# Patient Record
Sex: FEMALE | Race: BLACK OR AFRICAN AMERICAN | NOT HISPANIC OR LATINO | Employment: OTHER | URBAN - METROPOLITAN AREA
[De-identification: names, ages, dates, MRNs, and addresses within clinical notes are randomized per-mention and may not be internally consistent; named-entity substitution may affect disease eponyms.]

---

## 2018-01-25 ENCOUNTER — TRANSCRIBE ORDERS (OUTPATIENT)
Dept: ADMINISTRATIVE | Facility: HOSPITAL | Age: 83
End: 2018-01-25

## 2018-01-25 DIAGNOSIS — E78.5 HYPERLIPIDEMIA, UNSPECIFIED HYPERLIPIDEMIA TYPE: ICD-10-CM

## 2018-01-25 DIAGNOSIS — I15.8 SECONDARY DIASTOLIC HYPERTENSION: ICD-10-CM

## 2018-01-25 DIAGNOSIS — R73.01 IMPAIRED FASTING GLUCOSE: Primary | ICD-10-CM

## 2018-01-25 DIAGNOSIS — Z12.39 BREAST SCREENING: ICD-10-CM

## 2018-01-26 ENCOUNTER — APPOINTMENT (OUTPATIENT)
Dept: LAB | Facility: CLINIC | Age: 83
End: 2018-01-26
Payer: MEDICARE

## 2018-01-26 DIAGNOSIS — E78.5 HYPERLIPIDEMIA, UNSPECIFIED HYPERLIPIDEMIA TYPE: ICD-10-CM

## 2018-01-26 DIAGNOSIS — R73.01 IMPAIRED FASTING GLUCOSE: ICD-10-CM

## 2018-01-26 DIAGNOSIS — Z12.39 BREAST SCREENING: ICD-10-CM

## 2018-01-26 DIAGNOSIS — I15.8 SECONDARY DIASTOLIC HYPERTENSION: ICD-10-CM

## 2018-01-26 LAB
ALBUMIN SERPL BCP-MCNC: 3.3 G/DL (ref 3.5–5)
ALP SERPL-CCNC: 81 U/L (ref 46–116)
ALT SERPL W P-5'-P-CCNC: 23 U/L (ref 12–78)
ANION GAP SERPL CALCULATED.3IONS-SCNC: 7 MMOL/L (ref 4–13)
AST SERPL W P-5'-P-CCNC: 25 U/L (ref 5–45)
BASOPHILS # BLD AUTO: 0.05 THOUSANDS/ΜL (ref 0–0.1)
BASOPHILS NFR BLD AUTO: 1 % (ref 0–1)
BILIRUB SERPL-MCNC: 0.4 MG/DL (ref 0.2–1)
BUN SERPL-MCNC: 32 MG/DL (ref 5–25)
CALCIUM SERPL-MCNC: 9.6 MG/DL (ref 8.3–10.1)
CHLORIDE SERPL-SCNC: 104 MMOL/L (ref 100–108)
CHOLEST SERPL-MCNC: 143 MG/DL (ref 50–200)
CO2 SERPL-SCNC: 30 MMOL/L (ref 21–32)
CREAT SERPL-MCNC: 1.58 MG/DL (ref 0.6–1.3)
CREAT UR-MCNC: 46 MG/DL
EOSINOPHIL # BLD AUTO: 0.44 THOUSAND/ΜL (ref 0–0.61)
EOSINOPHIL NFR BLD AUTO: 6 % (ref 0–6)
ERYTHROCYTE [DISTWIDTH] IN BLOOD BY AUTOMATED COUNT: 13.5 % (ref 11.6–15.1)
EST. AVERAGE GLUCOSE BLD GHB EST-MCNC: 151 MG/DL
GFR SERPL CREATININE-BSD FRML MDRD: 29 ML/MIN/1.73SQ M
GLUCOSE P FAST SERPL-MCNC: 146 MG/DL (ref 65–99)
HBA1C MFR BLD: 6.9 % (ref 4.2–6.3)
HCT VFR BLD AUTO: 38.4 % (ref 34.8–46.1)
HDLC SERPL-MCNC: 82 MG/DL (ref 40–60)
HGB BLD-MCNC: 12.6 G/DL (ref 11.5–15.4)
LDLC SERPL CALC-MCNC: 50 MG/DL (ref 0–100)
LYMPHOCYTES # BLD AUTO: 1.69 THOUSANDS/ΜL (ref 0.6–4.47)
LYMPHOCYTES NFR BLD AUTO: 23 % (ref 14–44)
MCH RBC QN AUTO: 27.6 PG (ref 26.8–34.3)
MCHC RBC AUTO-ENTMCNC: 32.8 G/DL (ref 31.4–37.4)
MCV RBC AUTO: 84 FL (ref 82–98)
MICROALBUMIN UR-MCNC: 74.2 MG/L (ref 0–20)
MICROALBUMIN/CREAT 24H UR: 161 MG/G CREATININE (ref 0–30)
MONOCYTES # BLD AUTO: 0.5 THOUSAND/ΜL (ref 0.17–1.22)
MONOCYTES NFR BLD AUTO: 7 % (ref 4–12)
NEUTROPHILS # BLD AUTO: 4.83 THOUSANDS/ΜL (ref 1.85–7.62)
NEUTS SEG NFR BLD AUTO: 63 % (ref 43–75)
PLATELET # BLD AUTO: 243 THOUSANDS/UL (ref 149–390)
PMV BLD AUTO: 9.7 FL (ref 8.9–12.7)
POTASSIUM SERPL-SCNC: 3.9 MMOL/L (ref 3.5–5.3)
PROT SERPL-MCNC: 8.2 G/DL (ref 6.4–8.2)
RBC # BLD AUTO: 4.56 MILLION/UL (ref 3.81–5.12)
SODIUM SERPL-SCNC: 141 MMOL/L (ref 136–145)
TRIGL SERPL-MCNC: 53 MG/DL
TSH SERPL DL<=0.05 MIU/L-ACNC: 3.59 UIU/ML (ref 0.36–3.74)
URATE SERPL-MCNC: 5.8 MG/DL (ref 2–6.8)
WBC # BLD AUTO: 7.51 THOUSAND/UL (ref 4.31–10.16)

## 2018-01-26 PROCEDURE — 84443 ASSAY THYROID STIM HORMONE: CPT

## 2018-01-26 PROCEDURE — 36415 COLL VENOUS BLD VENIPUNCTURE: CPT

## 2018-01-26 PROCEDURE — 83036 HEMOGLOBIN GLYCOSYLATED A1C: CPT

## 2018-01-26 PROCEDURE — 82570 ASSAY OF URINE CREATININE: CPT | Performed by: FAMILY MEDICINE

## 2018-01-26 PROCEDURE — 80061 LIPID PANEL: CPT

## 2018-01-26 PROCEDURE — 80053 COMPREHEN METABOLIC PANEL: CPT

## 2018-01-26 PROCEDURE — 84550 ASSAY OF BLOOD/URIC ACID: CPT

## 2018-01-26 PROCEDURE — 85025 COMPLETE CBC W/AUTO DIFF WBC: CPT

## 2018-01-26 PROCEDURE — 82043 UR ALBUMIN QUANTITATIVE: CPT | Performed by: FAMILY MEDICINE

## 2018-02-08 ENCOUNTER — HOSPITAL ENCOUNTER (OUTPATIENT)
Dept: RADIOLOGY | Age: 83
Discharge: HOME | End: 2018-02-08
Payer: MEDICARE

## 2018-02-08 DIAGNOSIS — Z12.39 BREAST SCREENING: ICD-10-CM

## 2018-02-08 PROCEDURE — 77080 DXA BONE DENSITY AXIAL: CPT

## 2018-02-28 ENCOUNTER — APPOINTMENT (EMERGENCY)
Dept: CT IMAGING | Facility: HOSPITAL | Age: 83
End: 2018-02-28
Payer: MEDICARE

## 2018-02-28 ENCOUNTER — HOSPITAL ENCOUNTER (EMERGENCY)
Facility: HOSPITAL | Age: 83
End: 2018-03-01
Attending: EMERGENCY MEDICINE | Admitting: EMERGENCY MEDICINE
Payer: MEDICARE

## 2018-02-28 ENCOUNTER — APPOINTMENT (EMERGENCY)
Dept: RADIOLOGY | Facility: HOSPITAL | Age: 83
End: 2018-02-28
Payer: MEDICARE

## 2018-02-28 DIAGNOSIS — R91.1 PULMONARY NODULE: ICD-10-CM

## 2018-02-28 DIAGNOSIS — S00.03XA HEMATOMA OF FRONTAL SCALP, INITIAL ENCOUNTER: ICD-10-CM

## 2018-02-28 DIAGNOSIS — S32.040A: Primary | ICD-10-CM

## 2018-02-28 LAB
ALBUMIN SERPL BCP-MCNC: 3.2 G/DL (ref 3.5–5)
ALP SERPL-CCNC: 71 U/L (ref 46–116)
ALT SERPL W P-5'-P-CCNC: 26 U/L (ref 12–78)
ANION GAP SERPL CALCULATED.3IONS-SCNC: 6 MMOL/L (ref 4–13)
AST SERPL W P-5'-P-CCNC: 23 U/L (ref 5–45)
BASOPHILS # BLD AUTO: 0.02 THOUSANDS/ΜL (ref 0–0.1)
BASOPHILS NFR BLD AUTO: 0 % (ref 0–1)
BILIRUB SERPL-MCNC: 0.3 MG/DL (ref 0.2–1)
BUN SERPL-MCNC: 24 MG/DL (ref 5–25)
CALCIUM SERPL-MCNC: 9.6 MG/DL (ref 8.3–10.1)
CHLORIDE SERPL-SCNC: 103 MMOL/L (ref 100–108)
CO2 SERPL-SCNC: 30 MMOL/L (ref 21–32)
CREAT SERPL-MCNC: 1.44 MG/DL (ref 0.6–1.3)
EOSINOPHIL # BLD AUTO: 0.33 THOUSAND/ΜL (ref 0–0.61)
EOSINOPHIL NFR BLD AUTO: 3 % (ref 0–6)
ERYTHROCYTE [DISTWIDTH] IN BLOOD BY AUTOMATED COUNT: 13.8 % (ref 11.6–15.1)
GFR SERPL CREATININE-BSD FRML MDRD: 37 ML/MIN/1.73SQ M
GLUCOSE SERPL-MCNC: 160 MG/DL (ref 65–140)
HCT VFR BLD AUTO: 35.2 % (ref 34.8–46.1)
HGB BLD-MCNC: 11.7 G/DL (ref 11.5–15.4)
LYMPHOCYTES # BLD AUTO: 2.05 THOUSANDS/ΜL (ref 0.6–4.47)
LYMPHOCYTES NFR BLD AUTO: 21 % (ref 14–44)
MCH RBC QN AUTO: 28.1 PG (ref 26.8–34.3)
MCHC RBC AUTO-ENTMCNC: 33.2 G/DL (ref 31.4–37.4)
MCV RBC AUTO: 85 FL (ref 82–98)
MONOCYTES # BLD AUTO: 0.56 THOUSAND/ΜL (ref 0.17–1.22)
MONOCYTES NFR BLD AUTO: 6 % (ref 4–12)
NEUTROPHILS # BLD AUTO: 6.94 THOUSANDS/ΜL (ref 1.85–7.62)
NEUTS SEG NFR BLD AUTO: 70 % (ref 43–75)
PLATELET # BLD AUTO: 200 THOUSANDS/UL (ref 149–390)
PMV BLD AUTO: 9.6 FL (ref 8.9–12.7)
POTASSIUM SERPL-SCNC: 4 MMOL/L (ref 3.5–5.3)
PROT SERPL-MCNC: 7.8 G/DL (ref 6.4–8.2)
RBC # BLD AUTO: 4.16 MILLION/UL (ref 3.81–5.12)
SODIUM SERPL-SCNC: 139 MMOL/L (ref 136–145)
WBC # BLD AUTO: 9.9 THOUSAND/UL (ref 4.31–10.16)

## 2018-02-28 PROCEDURE — 70486 CT MAXILLOFACIAL W/O DYE: CPT

## 2018-02-28 PROCEDURE — 70450 CT HEAD/BRAIN W/O DYE: CPT

## 2018-02-28 PROCEDURE — 71250 CT THORAX DX C-: CPT

## 2018-02-28 PROCEDURE — 74176 CT ABD & PELVIS W/O CONTRAST: CPT

## 2018-02-28 PROCEDURE — 72125 CT NECK SPINE W/O DYE: CPT

## 2018-02-28 PROCEDURE — 73564 X-RAY EXAM KNEE 4 OR MORE: CPT

## 2018-02-28 PROCEDURE — 36415 COLL VENOUS BLD VENIPUNCTURE: CPT | Performed by: EMERGENCY MEDICINE

## 2018-02-28 PROCEDURE — 80053 COMPREHEN METABOLIC PANEL: CPT | Performed by: EMERGENCY MEDICINE

## 2018-02-28 PROCEDURE — 85025 COMPLETE CBC W/AUTO DIFF WBC: CPT | Performed by: EMERGENCY MEDICINE

## 2018-03-01 ENCOUNTER — HOSPITAL ENCOUNTER (INPATIENT)
Facility: HOSPITAL | Age: 83
LOS: 1 days | Discharge: HOME | DRG: 552 | End: 2018-03-02
Attending: SURGERY | Admitting: SURGERY
Payer: MEDICARE

## 2018-03-01 ENCOUNTER — APPOINTMENT (INPATIENT)
Dept: RADIOLOGY | Facility: HOSPITAL | Age: 83
DRG: 552 | End: 2018-03-01
Payer: MEDICARE

## 2018-03-01 VITALS
HEIGHT: 61 IN | SYSTOLIC BLOOD PRESSURE: 130 MMHG | OXYGEN SATURATION: 98 % | DIASTOLIC BLOOD PRESSURE: 63 MMHG | BODY MASS INDEX: 28.13 KG/M2 | RESPIRATION RATE: 16 BRPM | HEART RATE: 96 BPM | TEMPERATURE: 98.2 F | WEIGHT: 149 LBS

## 2018-03-01 DIAGNOSIS — S32.049A CLOSED FRACTURE OF FOURTH LUMBAR VERTEBRA, UNSPECIFIED FRACTURE MORPHOLOGY, INITIAL ENCOUNTER (HCC): Primary | ICD-10-CM

## 2018-03-01 PROBLEM — E11.9 DM2 (DIABETES MELLITUS, TYPE 2) (HCC): Chronic | Status: ACTIVE | Noted: 2018-03-01

## 2018-03-01 PROBLEM — E78.5 HYPERLIPIDEMIA: Chronic | Status: ACTIVE | Noted: 2018-03-01

## 2018-03-01 PROBLEM — I10 HTN (HYPERTENSION): Chronic | Status: ACTIVE | Noted: 2018-03-01
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
## 2018-03-01 PROBLEM — J44.9 COPD (CHRONIC OBSTRUCTIVE PULMONARY DISEASE) (HCC): Chronic | Status: ACTIVE | Noted: 2018-03-01

## 2018-03-01 PROBLEM — N18.9 CKD (CHRONIC KIDNEY DISEASE): Chronic | Status: RESOLVED | Noted: 2018-03-01 | Resolved: 2018-03-01

## 2018-03-01 PROBLEM — N17.9 ACUTE KIDNEY INJURY SUPERIMPOSED ON CHRONIC KIDNEY DISEASE (HCC): Status: ACTIVE | Noted: 2018-03-01

## 2018-03-01 PROBLEM — K59.00 CONSTIPATION: Status: ACTIVE | Noted: 2018-03-01

## 2018-03-01 PROBLEM — N18.9 ACUTE KIDNEY INJURY SUPERIMPOSED ON CHRONIC KIDNEY DISEASE (HCC): Status: ACTIVE | Noted: 2018-03-01

## 2018-03-01 PROBLEM — S32.040A CLOSED COMPRESSION FRACTURE OF L4 LUMBAR VERTEBRA: Status: ACTIVE | Noted: 2018-03-01

## 2018-03-01 PROBLEM — S00.03XA HEMATOMA OF FRONTAL SCALP: Status: ACTIVE | Noted: 2018-03-01

## 2018-03-01 PROBLEM — N32.81 OVERACTIVE BLADDER: Chronic | Status: ACTIVE | Noted: 2018-03-01

## 2018-03-01 PROBLEM — R26.2 AMBULATORY DYSFUNCTION: Status: ACTIVE | Noted: 2018-03-01

## 2018-03-01 PROBLEM — R53.81 PHYSICAL DECONDITIONING: Status: ACTIVE | Noted: 2018-03-01

## 2018-03-01 PROBLEM — E03.9 HYPOTHYROIDISM: Chronic | Status: ACTIVE | Noted: 2018-03-01

## 2018-03-01 PROBLEM — R41.89 COGNITIVE IMPAIRMENT: Status: ACTIVE | Noted: 2018-03-01

## 2018-03-01 PROBLEM — N18.9 CKD (CHRONIC KIDNEY DISEASE): Chronic | Status: ACTIVE | Noted: 2018-03-01

## 2018-03-01 LAB
ANION GAP SERPL CALCULATED.3IONS-SCNC: 8 MMOL/L (ref 4–13)
BUN SERPL-MCNC: 24 MG/DL (ref 5–25)
CALCIUM SERPL-MCNC: 9.3 MG/DL (ref 8.3–10.1)
CHLORIDE SERPL-SCNC: 105 MMOL/L (ref 100–108)
CO2 SERPL-SCNC: 29 MMOL/L (ref 21–32)
CREAT SERPL-MCNC: 1.33 MG/DL (ref 0.6–1.3)
GFR SERPL CREATININE-BSD FRML MDRD: 41 ML/MIN/1.73SQ M
GLUCOSE SERPL-MCNC: 130 MG/DL (ref 65–140)
GLUCOSE SERPL-MCNC: 187 MG/DL (ref 65–140)
GLUCOSE SERPL-MCNC: 231 MG/DL (ref 65–140)
GLUCOSE SERPL-MCNC: 262 MG/DL (ref 65–140)
GLUCOSE SERPL-MCNC: 265 MG/DL (ref 65–140)
PLATELET # BLD AUTO: 200 THOUSANDS/UL (ref 149–390)
PMV BLD AUTO: 9.8 FL (ref 8.9–12.7)
POTASSIUM SERPL-SCNC: 4 MMOL/L (ref 3.5–5.3)
SODIUM SERPL-SCNC: 142 MMOL/L (ref 136–145)

## 2018-03-01 PROCEDURE — 99284 EMERGENCY DEPT VISIT MOD MDM: CPT

## 2018-03-01 PROCEDURE — 99285 EMERGENCY DEPT VISIT HI MDM: CPT

## 2018-03-01 PROCEDURE — G8979 MOBILITY GOAL STATUS: HCPCS

## 2018-03-01 PROCEDURE — 72100 X-RAY EXAM L-S SPINE 2/3 VWS: CPT

## 2018-03-01 PROCEDURE — G8987 SELF CARE CURRENT STATUS: HCPCS

## 2018-03-01 PROCEDURE — 82948 REAGENT STRIP/BLOOD GLUCOSE: CPT

## 2018-03-01 PROCEDURE — 99232 SBSQ HOSP IP/OBS MODERATE 35: CPT | Performed by: FAMILY MEDICINE

## 2018-03-01 PROCEDURE — 97163 PT EVAL HIGH COMPLEX 45 MIN: CPT

## 2018-03-01 PROCEDURE — G8978 MOBILITY CURRENT STATUS: HCPCS

## 2018-03-01 PROCEDURE — 97166 OT EVAL MOD COMPLEX 45 MIN: CPT

## 2018-03-01 PROCEDURE — 85049 AUTOMATED PLATELET COUNT: CPT | Performed by: SURGERY

## 2018-03-01 PROCEDURE — 80048 BASIC METABOLIC PNL TOTAL CA: CPT | Performed by: SURGERY

## 2018-03-01 PROCEDURE — G8989 SELF CARE D/C STATUS: HCPCS

## 2018-03-01 PROCEDURE — 96374 THER/PROPH/DIAG INJ IV PUSH: CPT

## 2018-03-01 PROCEDURE — G8988 SELF CARE GOAL STATUS: HCPCS

## 2018-03-01 PROCEDURE — 99222 1ST HOSP IP/OBS MODERATE 55: CPT | Performed by: SURGERY

## 2018-03-01 PROCEDURE — 99223 1ST HOSP IP/OBS HIGH 75: CPT | Performed by: PHYSICIAN ASSISTANT

## 2018-03-01 RX ORDER — HEPARIN SODIUM 5000 [USP'U]/ML
5000 INJECTION, SOLUTION INTRAVENOUS; SUBCUTANEOUS EVERY 8 HOURS SCHEDULED
Status: DISCONTINUED | OUTPATIENT
Start: 2018-03-01 | End: 2018-03-02 | Stop reason: HOSPADM

## 2018-03-01 RX ORDER — OXYCODONE HYDROCHLORIDE 5 MG/1
5 TABLET ORAL EVERY 4 HOURS PRN
Status: DISCONTINUED | OUTPATIENT
Start: 2018-03-01 | End: 2018-03-02 | Stop reason: HOSPADM

## 2018-03-01 RX ORDER — OXYCODONE HYDROCHLORIDE 5 MG/1
2.5 TABLET ORAL EVERY 4 HOURS PRN
Status: DISCONTINUED | OUTPATIENT
Start: 2018-03-01 | End: 2018-03-02 | Stop reason: HOSPADM

## 2018-03-01 RX ORDER — INSULIN GLARGINE 100 [IU]/ML
10 INJECTION, SOLUTION SUBCUTANEOUS
Status: DISCONTINUED | OUTPATIENT
Start: 2018-03-01 | End: 2018-03-02 | Stop reason: HOSPADM

## 2018-03-01 RX ORDER — VALSARTAN 160 MG/1
160 TABLET ORAL DAILY
COMMUNITY
End: 2022-01-10

## 2018-03-01 RX ORDER — MORPHINE SULFATE 2 MG/ML
2 INJECTION, SOLUTION INTRAMUSCULAR; INTRAVENOUS ONCE
Status: COMPLETED | OUTPATIENT
Start: 2018-03-01 | End: 2018-03-01

## 2018-03-01 RX ORDER — OXYBUTYNIN CHLORIDE 5 MG/1
5 TABLET, EXTENDED RELEASE ORAL DAILY
Status: DISCONTINUED | OUTPATIENT
Start: 2018-03-01 | End: 2018-03-01

## 2018-03-01 RX ORDER — ASPIRIN 81 MG/1
81 TABLET ORAL DAILY
COMMUNITY

## 2018-03-01 RX ORDER — ACETAMINOPHEN 325 MG/1
650 TABLET ORAL EVERY 6 HOURS PRN
Status: DISCONTINUED | OUTPATIENT
Start: 2018-03-01 | End: 2018-03-01

## 2018-03-01 RX ORDER — B-COMPLEX WITH VITAMIN C
2 TABLET ORAL
Status: DISCONTINUED | OUTPATIENT
Start: 2018-03-01 | End: 2018-03-02 | Stop reason: HOSPADM

## 2018-03-01 RX ORDER — OXYBUTYNIN CHLORIDE 5 MG/1
5 TABLET, EXTENDED RELEASE ORAL DAILY
COMMUNITY
End: 2018-03-14

## 2018-03-01 RX ORDER — GLIMEPIRIDE 2 MG/1
2 TABLET ORAL
COMMUNITY
End: 2019-08-16 | Stop reason: ALTCHOICE

## 2018-03-01 RX ORDER — ACETAMINOPHEN 325 MG/1
650 TABLET ORAL EVERY 8 HOURS SCHEDULED
Status: DISCONTINUED | OUTPATIENT
Start: 2018-03-01 | End: 2018-03-02 | Stop reason: HOSPADM

## 2018-03-01 RX ORDER — ATORVASTATIN CALCIUM 40 MG/1
40 TABLET, FILM COATED ORAL DAILY
Status: DISCONTINUED | OUTPATIENT
Start: 2018-03-01 | End: 2018-03-02 | Stop reason: HOSPADM

## 2018-03-01 RX ORDER — BISACODYL 10 MG
10 SUPPOSITORY, RECTAL RECTAL DAILY PRN
Status: DISCONTINUED | OUTPATIENT
Start: 2018-03-01 | End: 2018-03-02

## 2018-03-01 RX ORDER — LIDOCAINE 50 MG/G
1 PATCH TOPICAL DAILY
Status: DISCONTINUED | OUTPATIENT
Start: 2018-03-01 | End: 2018-03-02 | Stop reason: HOSPADM

## 2018-03-01 RX ORDER — FLUTICASONE PROPIONATE 110 UG/1
1 AEROSOL, METERED RESPIRATORY (INHALATION) 2 TIMES DAILY
COMMUNITY
End: 2019-10-17

## 2018-03-01 RX ORDER — DOCUSATE SODIUM 100 MG/1
100 CAPSULE, LIQUID FILLED ORAL 2 TIMES DAILY
Status: DISCONTINUED | OUTPATIENT
Start: 2018-03-01 | End: 2018-03-02 | Stop reason: HOSPADM

## 2018-03-01 RX ORDER — LEVOTHYROXINE SODIUM 0.1 MG/1
100 TABLET ORAL
Status: DISCONTINUED | OUTPATIENT
Start: 2018-03-01 | End: 2018-03-02 | Stop reason: HOSPADM

## 2018-03-01 RX ORDER — LEVOTHYROXINE SODIUM 0.1 MG/1
100 TABLET ORAL DAILY
COMMUNITY

## 2018-03-01 RX ORDER — SENNOSIDES 8.6 MG
2 TABLET ORAL
Status: DISCONTINUED | OUTPATIENT
Start: 2018-03-01 | End: 2018-03-02 | Stop reason: HOSPADM

## 2018-03-01 RX ORDER — FLUTICASONE PROPIONATE 110 UG/1
1 AEROSOL, METERED RESPIRATORY (INHALATION) 2 TIMES DAILY
Status: DISCONTINUED | OUTPATIENT
Start: 2018-03-01 | End: 2018-03-02 | Stop reason: HOSPADM

## 2018-03-01 RX ORDER — VALSARTAN 160 MG/1
160 TABLET ORAL DAILY
Status: DISCONTINUED | OUTPATIENT
Start: 2018-03-01 | End: 2018-03-02 | Stop reason: HOSPADM

## 2018-03-01 RX ORDER — SODIUM CHLORIDE 9 MG/ML
75 INJECTION, SOLUTION INTRAVENOUS CONTINUOUS
Status: DISCONTINUED | OUTPATIENT
Start: 2018-03-01 | End: 2018-03-02 | Stop reason: HOSPADM

## 2018-03-01 RX ORDER — DOCUSATE SODIUM 100 MG/1
100 CAPSULE, LIQUID FILLED ORAL 2 TIMES DAILY
COMMUNITY

## 2018-03-01 RX ORDER — ONDANSETRON 2 MG/ML
4 INJECTION INTRAMUSCULAR; INTRAVENOUS EVERY 6 HOURS PRN
Status: DISCONTINUED | OUTPATIENT
Start: 2018-03-01 | End: 2018-03-02 | Stop reason: HOSPADM

## 2018-03-01 RX ORDER — PROCHLORPERAZINE 25 MG
25 SUPPOSITORY, RECTAL RECTAL EVERY 12 HOURS PRN
Status: DISCONTINUED | OUTPATIENT
Start: 2018-03-01 | End: 2018-03-02

## 2018-03-01 RX ORDER — ATORVASTATIN CALCIUM 40 MG/1
40 TABLET, FILM COATED ORAL DAILY
COMMUNITY

## 2018-03-01 RX ADMIN — MORPHINE SULFATE 2 MG: 2 INJECTION, SOLUTION INTRAMUSCULAR; INTRAVENOUS at 02:19

## 2018-03-01 RX ADMIN — SENNOSIDES 17.2 MG: 8.6 TABLET, FILM COATED ORAL at 21:59

## 2018-03-01 RX ADMIN — FLUTICASONE PROPIONATE AND SALMETEROL 1 PUFF: 50; 100 POWDER RESPIRATORY (INHALATION) at 09:37

## 2018-03-01 RX ADMIN — HEPARIN SODIUM 5000 UNITS: 5000 INJECTION, SOLUTION INTRAVENOUS; SUBCUTANEOUS at 14:11

## 2018-03-01 RX ADMIN — INSULIN LISPRO 2 UNITS: 100 INJECTION, SOLUTION INTRAVENOUS; SUBCUTANEOUS at 12:43

## 2018-03-01 RX ADMIN — INSULIN LISPRO 1 UNITS: 100 INJECTION, SOLUTION INTRAVENOUS; SUBCUTANEOUS at 08:57

## 2018-03-01 RX ADMIN — OXYBUTYNIN CHLORIDE 5 MG: 5 TABLET, EXTENDED RELEASE ORAL at 09:36

## 2018-03-01 RX ADMIN — FLUTICASONE PROPIONATE 1 PUFF: 110 AEROSOL, METERED RESPIRATORY (INHALATION) at 17:40

## 2018-03-01 RX ADMIN — FLUTICASONE PROPIONATE AND SALMETEROL 1 PUFF: 50; 100 POWDER RESPIRATORY (INHALATION) at 21:58

## 2018-03-01 RX ADMIN — ATORVASTATIN CALCIUM 40 MG: 40 TABLET, FILM COATED ORAL at 09:35

## 2018-03-01 RX ADMIN — INSULIN LISPRO 3 UNITS: 100 INJECTION, SOLUTION INTRAVENOUS; SUBCUTANEOUS at 12:43

## 2018-03-01 RX ADMIN — HEPARIN SODIUM 5000 UNITS: 5000 INJECTION, SOLUTION INTRAVENOUS; SUBCUTANEOUS at 21:58

## 2018-03-01 RX ADMIN — FLUTICASONE PROPIONATE 1 PUFF: 110 AEROSOL, METERED RESPIRATORY (INHALATION) at 09:37

## 2018-03-01 RX ADMIN — HYDROMORPHONE HYDROCHLORIDE 0.5 MG: 1 INJECTION, SOLUTION INTRAMUSCULAR; INTRAVENOUS; SUBCUTANEOUS at 06:20

## 2018-03-01 RX ADMIN — INSULIN GLARGINE 10 UNITS: 100 INJECTION, SOLUTION SUBCUTANEOUS at 22:03

## 2018-03-01 RX ADMIN — ONDANSETRON 4 MG: 2 INJECTION INTRAMUSCULAR; INTRAVENOUS at 08:41

## 2018-03-01 RX ADMIN — INSULIN LISPRO 6 UNITS: 100 INJECTION, SOLUTION INTRAVENOUS; SUBCUTANEOUS at 18:19

## 2018-03-01 RX ADMIN — INSULIN LISPRO 3 UNITS: 100 INJECTION, SOLUTION INTRAVENOUS; SUBCUTANEOUS at 22:09

## 2018-03-01 RX ADMIN — CALCIUM CARBONATE 500 MG (1,250 MG)-VITAMIN D3 200 UNIT TABLET 2 TABLET: at 09:35

## 2018-03-01 RX ADMIN — DOCUSATE SODIUM 100 MG: 100 CAPSULE, LIQUID FILLED ORAL at 17:40

## 2018-03-01 RX ADMIN — ACETAMINOPHEN 650 MG: 325 TABLET, FILM COATED ORAL at 14:11

## 2018-03-01 RX ADMIN — INSULIN LISPRO 3 UNITS: 100 INJECTION, SOLUTION INTRAVENOUS; SUBCUTANEOUS at 08:56

## 2018-03-01 RX ADMIN — LEVOTHYROXINE SODIUM 100 MCG: 100 TABLET ORAL at 05:17

## 2018-03-01 RX ADMIN — LIDOCAINE 1 PATCH: 50 PATCH TOPICAL at 12:43

## 2018-03-01 RX ADMIN — DOCUSATE SODIUM 100 MG: 100 CAPSULE, LIQUID FILLED ORAL at 09:36

## 2018-03-01 RX ADMIN — OXYCODONE HYDROCHLORIDE 2.5 MG: 5 TABLET ORAL at 04:33

## 2018-03-01 RX ADMIN — HEPARIN SODIUM 5000 UNITS: 5000 INJECTION, SOLUTION INTRAVENOUS; SUBCUTANEOUS at 05:17

## 2018-03-01 RX ADMIN — SODIUM CHLORIDE 75 ML/HR: 0.9 INJECTION, SOLUTION INTRAVENOUS at 04:35

## 2018-03-01 RX ADMIN — ACETAMINOPHEN 650 MG: 325 TABLET, FILM COATED ORAL at 21:59

## 2018-03-01 NOTE — ASSESSMENT & PLAN NOTE
- d/c Oxybutinin, will not resume upon discharge due to anticholinergic side effects, per recommendation of geriatrics

## 2018-03-01 NOTE — ASSESSMENT & PLAN NOTE
- with 2 episodes of emesis this morning  - abdomen soft, but mildly distended and non-tender on exam  - Add senna and dulcolax suppository today to bowel regimen in addition to colace  - if persists, will recheck KUB in AM

## 2018-03-01 NOTE — PLAN OF CARE
Problem: PHYSICAL THERAPY ADULT  Goal: Performs mobility at highest level of function for planned discharge setting  See evaluation for individualized goals  Treatment/Interventions: Functional transfer training, LE strengthening/ROM, Elevations, Therapeutic exercise, Endurance training, Cognitive reorientation, Patient/family training, Equipment eval/education, Bed mobility, Gait training, Compensatory technique education, Continued evaluation, Spoke to nursing  Equipment Recommended:  (pt has a walker cane and rollator)       See flowsheet documentation for full assessment, interventions and recommendations    Prognosis: Good  Problem List: Decreased strength, Decreased range of motion, Decreased endurance, Impaired balance, Decreased mobility, Decreased coordination, Decreased safety awareness, Pain, Orthopedic restrictions, Decreased skin integrity, Impaired judgement  Assessment: Pt is a 80 y o  female admitted to [facility] on 3/1/2018 w/ Closed compression fracture of L4 lumbar vertebra (HCC); currently non-op in LSO Pt exhibits significant impairments with weakness, decreased ROM, impaired balance, decreased endurance, impaired coordination, gait deviations, pain, decreased activity tolerance, decreased functional mobility tolerance, decreased safety awareness, impaired judgement, fall risk, orthopedic restrictions and SOB upon exertion; these impact independence with mobility, ADLs, and IADLs; requires min assist w transf and amb 30 ft using RW- dista limited by decreased O2 sats to low 80s w amb on RA, pt mildly dyspneic; gait unsteady walker reliant + falls risk; objective measures on the Barthel Index also reveal limitations;  therapy prognosis is impacted by relevant co morbidities as noted in evaluation; clinical presentation is currently unstable/unpredictable - pt is on cont puilse oximetry, + GARCIA decreased O2 sats w mob, presents  w complex hx and phys impairments as noted a regression from baseline; PTA, pt was Independent with mobility lives  w dtr in multilevel w stair lift indoors and few steps to enter, fam support available; skilled PT is indicated to to optimize functional independence and discharge planning; pending functional progress, PT recommendation at discharge is Home PT  and home with family support         Recommendation: Home PT, Home with family support          See flowsheet documentation for full assessment

## 2018-03-01 NOTE — PROGRESS NOTES
Progress Note - Ian Barrios 9/26/1928, 80 y o  female MRN: [id number]    Unit/Bed#: [facility] 930-01 Encounter: [id number]    Primary Care Provider: Cosme Shepard DO   Date and time admitted to hospital: 3/1/2018  3:23 AM    79 y/o female s/p mechanical fall    * Closed compression fracture of L4 lumbar vertebra (HCC)   Assessment & Plan    - non-operative management in LSO brace per Neurosurgery  - upright xrays pending  - PT/OT pending        Hematoma of frontal scalp   Assessment & Plan    - local wound care        Constipation   Assessment & Plan    - with 2 episodes of emesis this morning  - abdomen soft, but mildly distended and non-tender on exam  - Add senna and dulcolax suppository today to bowel regimen in addition to colace  - if persists, will recheck KUB in AM        Physical deconditioning   Assessment & Plan    - PT/OT pending        Ambulatory dysfunction   Assessment & Plan    - PT/OT pending, will possibly require inpatient skilled rehab upon discharge        Cognitive impairment   Assessment & Plan    - Appreciate geriatrics input  - continue delirium precautions  - f/u at [facility] upon discharge for formal cog assessment        Fall (on) (from) other stairs and steps, initial encounter   Assessment & Plan    - PT/OT pending  - fall precautions        Hypothyroidism   Assessment & Plan    - on home synthroid, continue        Overactive bladder   Assessment & Plan    - d/c Oxybutinin, will not resume upon discharge due to anticholinergic side effects, per recommendation of geriatrics        COPD (chronic obstructive pulmonary disease) ([facility] [address] )   Assessment & Plan    - without acute exacerbation  - continue advair and flovent        Acute kidney injury superimposed on chronic kidney disease ([facility] [address] )   Assessment & Plan    - continue gentle IVF hydration  - Cr improved from 1 5 to 1 3 today  - repeat BMP in AM        HTN (hypertension)   Assessment & Plan    - continue home medications DM2 (diabetes mellitus, type 2) ([facility] [address] )   Assessment & Plan    - uncontrolled  Home metformin and glyburide on hold with occasional vomiting    - continue lantus and mealtime coverage as ordered  - increase SSI to algorithm 4 due to elevated BS of 265 today  Proph: SCDs, SQH  Disp: pending PT/OT eval    Bedside nurse rounds completed with nurse Rob Piedra  Patient aware of plan of care for the day  Mechanism of Injury: Fall    Transfer from: [employer]  Outside Films Received: yes  Tertiary Exam Due on: 3/1/2018    Vitals: Blood pressure 103/54, pulse 82, temperature 97 7 °F (36 5 °C), temperature source Oral, resp  rate 16, height 5' 1" (1 549 m), weight 67 6 kg (149 lb 0 5 oz), SpO2 93 %  ,Body mass index is 28 16 kg/m²  CT / RADIOGRAPHS: ALL RESULTS MUST BE CONFIRMED BY FACULTY OR PRINTED REPORT    CT HEAD: No acute intracranial abnormality  Microangiopathic change  Atrophy  Scalp hematoma  CT CHEST:    CT FACE: 1  No acute fracture  2    Old left nasal bone fracture  3   Scalp hematoma  CT ABDOMEN / PELVIS:   1   Bibasilar atelectasis or scarring  Scarring or atelectasis in the superior segment of the right lower lobe  2   Moderate compression fracture of the superior endplate of L4 with a suspected Schmorl's node  This may represent chronic or acute on chronic fracture  3   Thickening of the walls of the distal esophagus  Possibly could be secondary to a hiatal hernia  If clinically appropriate, consider endoscopy or barium swallow  CT CERVICAL SPINE: No cervical spine fracture or traumatic malalignment  XR PELVIS:    CT THORACIC / LUMBAR SPINE: No fracture or traumatic subluxation  Degenerative changes   CXR CHEST:    OTHER: R knee XR: negative OTHER:    OTHER: L Knee XR: negative OTHER:    OTHER: OTHER:    OTHER:  OTHER:    OTHER:  OTHER:      Consultants - List Service/ Faculty and Date: Neurosurgery, Geriatrics    Active medications:           Current Facility-Administered Medications:     acetaminophen (TYLENOL) tablet 650 mg, 650 mg, Oral, Q8H [address]    atorvastatin (LIPITOR) tablet 40 mg, 40 mg, Oral, Daily, 40 mg at 03/01/18 0935    bisacodyl (DULCOLAX) rectal suppository 10 mg, 10 mg, Rectal, Daily PRN    calcium carbonate-vitamin D (OSCAL-D) 500 mg-200 units per tablet 2 tablet, 2 tablet, Oral, Daily With Breakfast, 2 tablet at 03/01/18 0935    docusate sodium (COLACE) capsule 100 mg, 100 mg, Oral, BID, 100 mg at 03/01/18 0936    fluticasone (FLOVENT HFA) 110 MCG/ACT inhaler 1 puff, 1 puff, Inhalation, BID, 1 puff at 03/01/18 0937    fluticasone-salmeterol (ADVAIR) 100-50 mcg/dose inhaler 1 puff, 1 puff, Inhalation, Q12H [address], 1 puff at 03/01/18 0937    heparin (porcine) subcutaneous injection 5,000 Units, 5,000 Units, Subcutaneous, Q8H [address], 5,000 Units at 03/01/18 0517 **AND** Platelet count, , , Once    insulin glargine (LANTUS) subcutaneous injection 10 Units, 10 Units, Subcutaneous, HS    insulin lispro (HumaLOG) 100 units/mL subcutaneous injection 1-6 Units, 1-6 Units, Subcutaneous, HS    insulin lispro (HumaLOG) 100 units/mL subcutaneous injection 2-12 Units, 2-12 Units, Subcutaneous, TID AC **AND** Fingerstick Glucose (POCT), , , TID AC    levothyroxine tablet 100 mcg, 100 mcg, Oral, Early Morning, 100 mcg at 03/01/18 0517    lidocaine (LIDODERM) 5 % patch 1 patch, 1 patch, Transdermal, Daily, 1 patch at 03/01/18 1243    ondansetron (ZOFRAN) injection 4 mg, 4 mg, Intravenous, Q6H PRN, 4 mg at 03/01/18 0841    oxyCODONE (ROXICODONE) IR tablet 2 5 mg, 2 5 mg, Oral, Q4H PRN, 2 5 mg at 03/01/18 0433    oxyCODONE (ROXICODONE) IR tablet 5 mg, 5 mg, Oral, Q4H PRN    prochlorperazine (COMPAZINE) rectal suppository 25 mg, 25 mg, Rectal, Q12H PRN    senna (SENOKOT) tablet 17 2 mg, 2 tablet, Oral, HS    sodium chloride 0 9 % infusion, 75 mL/hr, Intravenous, Continuous, 75 mL/hr at 03/01/18 0435    valsartan (DIOVAN) tablet 160 mg, 160 mg, Oral, Daily      Intake/Output Summary (Last 24 hours) at 03/01/18 1405  Last data filed at 03/01/18 0900   Gross per 24 hour   Intake              490 ml   Output              350 ml   Net              140 ml       Invasive Devices     Peripheral Intravenous Line            Peripheral IV 02/28/18 Right Antecubital less than 1 day                CAGE-AID Questionnaire:    Was the patient able to participate in the CAGE-AID screening questions on admission? Yes    Is the patient 65 years or older: YES:    1  Before the illness or injury that brought you to the Emergency, did you need someone to help you on a regular basis? 1=Yes   2  Since the illness or injury that brought you to the Emergency, have you needed more help than usual to take care of yourself? 1=Yes   3  Have you been hospitalized for one or more nights during the past 6 months (excluding a stay in the Emergency Department)? 0=No   4  In general, do you see well? 1=No   5  In general, do you have serious problems with your memory? 1=Yes   6  Do you take more than three different medications everyday? 1=Yes   TOTAL   5     Did you order a geriatric consult if the score was 2 or greater?: already completed    1  GCS:  GCS Total:  15  2  Head:   a  Inspect and palpate SCALP for:  swelling/ecchymosis:  Present: hematoma over forehead and b  Inspect and palpate FACE for:   swelling/ecchymosis:  None  3  Neck:   WNL  4  Chest:   WNL  5  Abdomen/Pelvis:   + mildly distended abdomen, non-tender to palpation throughout  6  Back (log roll with spinal immobilization unless cleared radiographically):   + LSO Brace in place  7  Extremities:   Lacs, abrasions, swelling, ecchymosis: none   Tenderness, pain with motor, instability: none  8  Peripheral Nerves: WNL    Do NOT use the following abbreviations: DTO, gr, Jackie, MS, MSO4, MgSO4, Nitro, QD, QID, QOD, u, , ?, ?g or trailing zeros   Always use a zero before a decimal     Labs:   CBC:   Lab Results   Component Value Date WBC 9 90 02/28/2018    HGB 11 7 02/28/2018    HCT 35 2 02/28/2018    MCV 85 02/28/2018     03/01/2018    MCH 28 1 02/28/2018    MCHC 33 2 02/28/2018    RDW 13 8 02/28/2018    MPV 9 8 03/01/2018     CMP:   Lab Results   Component Value Date     03/01/2018     03/01/2018    CO2 29 03/01/2018    ANIONGAP 8 03/01/2018    BUN 24 03/01/2018    CREATININE 1 33 (H) 03/01/2018    GLUCOSE 130 03/01/2018    CALCIUM 9 3 03/01/2018    AST 23 02/28/2018    ALT 26 02/28/2018    ALKPHOS 71 02/28/2018    PROT 7 8 02/28/2018    BILITOT 0 30 02/28/2018    EGFR 41 03/01/2018

## 2018-03-01 NOTE — H&P
H&P Exam - Trauma   Amanda Baron 80 y o  female MRN: [id number]  Unit/Bed#: ED 01 Encounter: [id number]    Assessment/Plan   Trauma Alert: Evaluation  Model of Arrival: Ambulance  Trauma Team: Attending Paul Mckeon and Residents Betsy  Consultants: Neurosurgery: L4 fracture  Time Called --    Trauma Active Problems:   L4 spine compression fracture  Frontal hematoma    Other problems:  DM2  HTN  COPD  CKD (prior nephrectomy over 60 years ago for ? bleeding)  HLD  Hypothyroidism  Overactive bladder    C-spine cleared and C-collar removed prior to transfer from [address] [place] [address] [place]:   Admit to trauma service  Neurosurgery consultation for L4 spine fracture  Spine precautions  Order LSO brace to be worn when out of bed  PT/OT evaluation and treatment when cleared by neurosurgery  Monitor frontal hematoma  Aggressive pulm toilet/IS  DVT ppx  Analgesia  Resume home meds  Diabetic diet    Chief Complaint: Lower back pain    History of Present Illness   HPI:  Amanda Baron is a 80 y o  female who presents s/p fall  Patient had a mechanical fall on 2/28 while going up a flight of stairs and fell backwards down about 11 steps  Denies LOC and was helped up and ambulatory at the scene  Takes ASA daily  Evaluation in ED at [facility] revealed L4 fracture and patient transferred to [facility] for further management  C/o lower back and upper sternum pain on evaluation  Mechanism:Fall    Review of Systems   Constitutional: Positive for activity change  Negative for chills and diaphoresis  HENT: Negative for drooling and facial swelling  Eyes: Negative for pain and discharge  Respiratory: Negative for cough, choking, chest tightness and shortness of breath  Cardiovascular: Negative for chest pain and leg swelling  Gastrointestinal: Negative for abdominal pain and constipation  Genitourinary: Positive for frequency  Musculoskeletal: Positive for back pain     Neurological: Negative for seizures, light-headedness and numbness  Psychiatric/Behavioral: Negative for confusion  Historical Information   History is unobtainable from the patient due to n/a  Efforts to obtain history included the following sources: family member, other medical personnel, obtained from other records    Past Medical History:   Diagnosis Date    Diabetes mellitus ([address] )     Hypertension    HLD  Hypothyroidism  Overactive bladder    History reviewed  No pertinent surgical history  R carotid endarterectomy  Nephrectomy    Social History   History   Alcohol Use No     History   Drug Use No     History   Smoking Status    Former Smoker   Smokeless Tobacco    Never Used       There is no immunization history on file for this patient  Last Tetanus: unknown  Family History: Non-contributory  Unable to obtain/limited by n/a      Meds/Allergies   PTA meds:   Prior to Admission Medications   Prescriptions Last Dose Informant Patient Reported? Taking?    Calcium Carb-Cholecalciferol (CALTRATE 600+D3 SOFT PO)   Yes Yes   Sig: Take 2 tablets by mouth   Umeclidinium-Vilanterol (ANORO ELLIPTA) 62 5-25 MCG/INH AEPB   Yes Yes   Sig: Inhale   aspirin (ECOTRIN LOW STRENGTH) 81 mg EC tablet   Yes Yes   Sig: Take 81 mg by mouth daily   atorvastatin (LIPITOR) 40 mg tablet   Yes Yes   Sig: Take 40 mg by mouth daily   docusate sodium (COLACE) 100 mg capsule   Yes Yes   Sig: Take 100 mg by mouth 2 (two) times a day   fluticasone (FLOVENT HFA) 110 MCG/ACT inhaler   Yes Yes   Sig: Inhale 1 puff 2 (two) times a day   glimepiride (AMARYL) 2 mg tablet   Yes Yes   Sig: Take 2 mg by mouth every morning before breakfast   levothyroxine 100 mcg tablet   Yes Yes   Sig: Take 100 mcg by mouth daily   metFORMIN (GLUCOPHAGE) 1000 MG tablet   Yes Yes   Sig: Take 1,000 mg by mouth 2 (two) times a day with meals   oxybutynin (DITROPAN-XL) 5 mg 24 hr tablet   Yes Yes   Sig: Take 5 mg by mouth daily   valsartan (DIOVAN) 160 mg tablet   Yes Yes   Sig: Take 160 mg by mouth daily Facility-Administered Medications: None       Allergies   Allergen Reactions    Sulfa Antibiotics          PHYSICAL EXAM    PE limited by:     Objective   Vitals:   First set: Temperature: 98 °F (36 7 °C) (03/01/18 0330)  Pulse: 95 (03/01/18 0330)  Respirations: 18 (03/01/18 0330)  Blood Pressure: 137/61 (03/01/18 0330)    Primary Survey:   (A) Airway: intact  (B) Breathing: lungs clear bilaterally  (C) Circulation: Pulses:   carotid  2/4, pedal  2/4, radial  2/4 and femoral  2/4  (D) Disabliity:  GCS Total:  15, Eye Opening:   Spontaneous = 4, Motor Response: Obeys commands = 6 and Verbal Response:  Oriented = 5  (E) Expose:  Completed    Secondary Survey: (Click on Physical Exam tab above)  Physical Exam   Constitutional: She is oriented to person, place, and time  She appears well-developed and well-nourished  No distress  HENT:   Head: Normocephalic  Frontal hematoma   Eyes: EOM are normal  Pupils are equal, round, and reactive to light  Left eye exhibits no discharge  Neck: Normal range of motion  Neck supple  Cardiovascular: Normal rate and regular rhythm  Pulmonary/Chest: Effort normal and breath sounds normal  No respiratory distress  Upper sternum tenderness   Abdominal: Soft  She exhibits no distension  There is no tenderness  Musculoskeletal: Normal range of motion  She exhibits no edema or deformity  L spine tenderness   Neurological: She is alert and oriented to person, place, and time  No cranial nerve deficit  Skin: Skin is warm and dry         Invasive Devices     Peripheral Intravenous Line            Peripheral IV 02/28/18 Right Antecubital less than 1 day                Lab Results:   BMP/CMP:   Lab Results   Component Value Date     02/28/2018    K 4 0 02/28/2018     02/28/2018    CO2 30 02/28/2018    ANIONGAP 6 02/28/2018    BUN 24 02/28/2018    CREATININE 1 44 (H) 02/28/2018    GLUCOSE 160 (H) 02/28/2018    CALCIUM 9 6 02/28/2018    AST 23 02/28/2018    ALT 26 02/28/2018    ALKPHOS 71 02/28/2018    PROT 7 8 02/28/2018    BILITOT 0 30 02/28/2018    EGFR 37 02/28/2018   , CBC:   Lab Results   Component Value Date    WBC 9 90 02/28/2018    HGB 11 7 02/28/2018    HCT 35 2 02/28/2018    MCV 85 02/28/2018     02/28/2018    MCH 28 1 02/28/2018    MCHC 33 2 02/28/2018    RDW 13 8 02/28/2018    MPV 9 6 02/28/2018   , ABG: No results found for: PHART, QNP5KVU, PO2ART, UCW1SSQ, Y2DGICGF, BEART, SOURCE and ISTAT: No components found for: VBG  Imaging/EKG Studies:   CT chest:  FINDINGS:  Lungs: Chronic appearing interstitial changes/fibrosis with atelectasis in the right lower lobe  Atelectasis or scarring in the lung bases  0 4 cm nodule in the right middle lobe  Pleural space: Within normal limits  No pneumothorax  No significant effusion  Heart: Within normal limits  No cardiomegaly  No significant pericardial effusion  Bones/joints: Within normal limits  No acute fracture  No dislocation  Soft tissues: Within normal limits  Vasculature: Within normal limits  No thoracic aortic aneurysm  Lymph nodes: Within normal limits  No enlarged lymph nodes  IMPRESSION:  No acute findings      CT Abdomen/pelvis:  FINDINGS:  Lower thorax: Please see concurrent report for CT of the chest   ABDOMEN:  Liver: Within normal limits  Gallbladder and bile ducts: Within normal limits  No calcified stones  No ductal dilation  Pancreas: Within normal limits  No ductal dilation  Spleen: Within normal limits  No splenomegaly  Adrenals: Within normal limits  No mass  Kidneys and ureters: Absent left kidney  No hydronephrosis  Stomach and bowel: Diffuse large colonic stool burden can be correlated for constipation  No  obstruction  No mucosal thickening  Appendix: No findings to suggest acute appendicitis  PELVIS:  Bladder: Within normal limits  No stones  Reproductive: Hysterectomy    ABDOMEN and PELVIS:  Intraperitoneal space: Linear soft tissue density in the right iliac fossa fat without surrounding fat  stranding favored to be chronic change, but correlate for tenderness which would support acute  traumatic injury  No free air  No significant fluid collection  Bones/joints: Decreased bone density can be correlated for osteopenia versus osteoporosis  Moderate compression fracture of L4 appears acute or acute on chronic  No dislocation  Soft tissues: Within normal limits  Vasculature: Moderate aortoiliac atherosclerosis  No aortic aneurysm  Lymph nodes: Within normal limits  No enlarged lymph nodes  Tubes, lines and devices:  IMPRESSION:  Moderate compression fracture of L4 appears acute or acute on chronic  Linear soft tissue density in the right iliac fossa fat favored to be chronic change, but correlate for  tenderness which would support acute traumatic injury      CT head:  FINDINGS:  Brain: Cerebral and cerebellar volume loss  Chronic white matter microvascular ischemia  No loss of  gray-white matter differentiation  No acute hemorrhage  No mass effect or midline shift  Ventricles: Ventricular size is concordant with degree of volume loss  Bones/joints: Within normal limits  No acute fracture  Soft tissues: Left frontal and hemispheric scalp hematoma  Sinuses: Maxillary mucosal thickening  No air-fluid levels  Mastoid air cells: Trace right mastoid opacification  IMPRESSION:  No acute intracranial hemorrhage      CT c-spine:  FINDINGS:  Vertebrae: Multilevel disc disease and degenerative changes with disc osteophyte complexes from  C2-3 through C6-7 and ligamentum flavum calcification resulting in spinal canal and neural foraminal  stenosis  Incidental nonunion of posterior arch of C1  No acute fracture  Cervical ribs noted at C7,  rudimentary and the left  Discs/spinal canal/neural foramina: See above  Soft tissues: Within normal limits  Lung apices: Unremarkable as visualized    IMPRESSION:  No acute fracture or malalignment      CT maxillofacial:     FINDINGS:  Bones/joints: No acute fracture  Soft tissues: Left nasal bone deformity without overlying soft tissue swelling appears chronic  Orbits: Within normal limits  Sinuses: Maxillary mucosal thickening  No air-fluid levels  Mastoid air cells: Trace right mastoid opacification  IMPRESSION:  No acute facial fracture      CT thoracic spine:  FINDINGS:  Vertebrae: Multilevel degenerative changes  No acute fracture  Discs/spinal canal/neural foramina: No acute findings  No significant spinal canal stenosis  Soft tissues: Within normal limits  IMPRESSION:  No acute fracture or malalignment    Other Studies:     Code Status: Level 1 - Full Code  Advance Directive and Living Will:      Power of :    POLST:

## 2018-03-01 NOTE — ASSESSMENT & PLAN NOTE
- Appreciate geriatrics input  - continue delirium precautions  - f/u at [facility] upon discharge for formal cog assessment

## 2018-03-01 NOTE — ED NOTES
Pt utilizing bed pan  Voided clear yellow urine  C-Collar remains in place  Awaiting CT results       Ilan Burkett RN  03/01/18 6675

## 2018-03-01 NOTE — CONSULTS
Consultation - Neurosurgery   Cuco Salcedo 80 y o  female MRN: [id number]  Unit/Bed#: [facility] 930-01 Encounter: [id number]      Assessment/Plan     Assessment:  1  L4 superior endplate compression fracture, TLICs 1  2  Status post mechanical fall down steps without loss of consciousness  3  Frontal scalp hematoma    Plan:  · Neuro exam:  Point tenderness to palpation at region of L4  Otherwise full strength and sensation upper and lower extremities  · Imaging reviewed personally with attending  · CT thoracolumbar spine/chest abdomen pelvis: The moderate compression fracture of superior endplate of L4 vertebral body with proximity of schmorl's node  · CT cervical spine:  Nonunion of posterior arch of C1, otherwise degenerative changes throughout the cervical spine  No cervical spine fracture or traumatic malalignment  · CT head:  Frontal scalp hematoma, otherwise no acute intracranial abnormality  · Management of L4 fracture  · Nonsurgical  · Fitted in LSO brace  To be worn when head of bed greater than 45° or out of bed  Brace may be done at bedside  · Clear to ambulate with Physical therapy and Occupational therapy from a neurosurgical standpoint  · Bending, lifting, twisting precautions  No lifting greater than 10 lb  · Upright lumbar x-rays ordered and pending  Pending stability patient may be discharged from the hospital from a neurosurgical standpoint, follow-up 2 weeks with repeat lumbar x-rays    · Continue management per primary team, trauma  · Pain management per primary team   Recommend referencing geriatric order set  · No neurosurgical contraindication to patient continuing aspirin which she was taking prior to admission, however, recommend waiting risk benefit ratio of aspirin in a patient with repetitive falls  · Geriatrics consulted, appreciate input  · Patient with complaint of sternal pain, shortness of breath, continue to monitor cardiopulmonary status  · Management of diabetes mellitus  · Neurosurgery will see 1 of the lumbar x-rays completed  Thereafter, pending stability, follow-up as outpatient as outlined above  History of Present Illness     HPI: Marco A Garner is a 80y o  year old female, past medical history of hypothyroidism, hypertension, hyperlipidemia, diabetes mellitus, COPD, history of carotid endarterectomy, presents status post mechanical fall down steps found to have L4 fracture  Patient sustained a mechanical fall while climbing steps on 02/28  Fell backwards down approximately 11 steps  Denies loss of consciousness  Family helped her up and but she was ambulatory at the scene  She was initially evaluated by the emergency room at [facility]  CT of the chest abdomen pelvis revealed an L4 fracture and patient was transferred to [facility] for trauma and neurosurgery evaluation  Patient admits to history of falls, last was at least 1 year ago  Denies loss of consciousness with this fall or previous falls  Denies having back pain following previous falls  Typically uses cane or roller walker while ambulating at home  Denies back pain prior to event  Following the fall patient endorses back pain located at the approximate T4 region at midline  Denies pain radiating into lower extremities  Denies numbness in lower extremities or perineal region  Has been able to empty her bladder  Tolerating a p o  diet  Denies thoracic or cervical pain  No upper extremity radicular pain or numbness  Denies weakness in upper and lower extremities  Patient Mets to discomfort in frontal region, likely related to hematoma  Denies significant headache, vision changes  Did have episode of nausea and vomiting earlier today  Denies confusion or seizure-like activity  Inpatient consult to Neurosurgery  Consult performed by: Larry Mccarthy  Consult ordered by: Adan Dan          Review of Systems   Constitutional: Negative for fever  Eyes: Negative for visual disturbance  Respiratory: Positive for shortness of breath  Negative for chest tightness  Cardiovascular: Positive for chest pain (reproducible midline in sternum)  Negative for palpitations  Gastrointestinal: Negative for abdominal pain  Genitourinary: Negative for difficulty urinating  Musculoskeletal: Positive for back pain  Negative for neck pain  Skin:        Frontal bruise/warmth   Allergic/Immunologic:        Sulfa abx   Neurological: Positive for dizziness  Negative for syncope, weakness and headaches  Psychiatric/Behavioral: Negative for confusion  The patient is not nervous/anxious  Historical Information   Past Medical History:   Diagnosis Date    [facility]) [facility] )     Diabetes mellitus ([facility] )     Hyperlipidemia     Hypertension     Hypothyroidism     Overactive bladder      Past Surgical History:   Procedure Laterality Date    CAROTID ENDARTERECTOMY Right     NEPHRECTOMY      in her 25s     History   Alcohol Use No     History   Drug Use No     History   Smoking Status    Former Smoker   Smokeless Tobacco    Never Used     History reviewed  No pertinent family history      Meds/Allergies   all current active meds have been reviewed, current meds:   Current Facility-Administered Medications   Medication Dose Route Frequency    acetaminophen (TYLENOL) tablet 650 mg  650 mg Oral Q8H [facility]    atorvastatin (LIPITOR) tablet 40 mg  40 mg Oral Daily    bisacodyl (DULCOLAX) rectal suppository 10 mg  10 mg Rectal Daily PRN    calcium carbonate-vitamin D (OSCAL-D) 500 mg-200 units per tablet 2 tablet  2 tablet Oral Daily With Breakfast    docusate sodium (COLACE) capsule 100 mg  100 mg Oral BID    fluticasone (FLOVENT HFA) 110 MCG/ACT inhaler 1 puff  1 puff Inhalation BID    fluticasone-salmeterol (ADVAIR) 100-50 mcg/dose inhaler 1 puff  1 puff Inhalation Q12H [facility]    heparin (porcine) subcutaneous injection 5,000 Units 5,000 Units Subcutaneous Q8H [facility]    HYDROmorphone (DILAUDID) injection 0 5 mg  0 5 mg Intravenous Q3H PRN    insulin glargine (LANTUS) subcutaneous injection 10 Units  10 Units Subcutaneous HS    insulin lispro (HumaLOG) 100 units/mL subcutaneous injection 1-5 Units  1-5 Units Subcutaneous TID AC    insulin lispro (HumaLOG) 100 units/mL subcutaneous injection 1-5 Units  1-5 Units Subcutaneous HS    insulin lispro (HumaLOG) 100 units/mL subcutaneous injection 3 Units  3 Units Subcutaneous Daily With Breakfast    insulin lispro (HumaLOG) 100 units/mL subcutaneous injection 3 Units  3 Units Subcutaneous Daily With Lunch    insulin lispro (HumaLOG) 100 units/mL subcutaneous injection 3 Units  3 Units Subcutaneous Daily With Dinner    levothyroxine tablet 100 mcg  100 mcg Oral Early Morning    lidocaine (LIDODERM) 5 % patch 1 patch  1 patch Transdermal Daily    ondansetron (ZOFRAN) injection 4 mg  4 mg Intravenous Q6H PRN    oxyCODONE (ROXICODONE) IR tablet 2 5 mg  2 5 mg Oral Q4H PRN    oxyCODONE (ROXICODONE) IR tablet 5 mg  5 mg Oral Q4H PRN    prochlorperazine (COMPAZINE) rectal suppository 25 mg  25 mg Rectal Q12H PRN    sodium chloride 0 9 % infusion  75 mL/hr Intravenous Continuous    valsartan (DIOVAN) tablet 160 mg  160 mg Oral Daily    and PTA meds:   Prior to Admission Medications   Prescriptions Last Dose Informant Patient Reported? Taking?    Calcium Carb-Cholecalciferol (CALTRATE 600+D3 SOFT PO)   Yes Yes   Sig: Take 2 tablets by mouth   Umeclidinium-Vilanterol (ANORO ELLIPTA) 62 5-25 MCG/INH AEPB   Yes Yes   Sig: Inhale   aspirin (ECOTRIN LOW STRENGTH) 81 mg EC tablet   Yes Yes   Sig: Take 81 mg by mouth daily   atorvastatin (LIPITOR) 40 mg tablet   Yes Yes   Sig: Take 40 mg by mouth daily   docusate sodium (COLACE) 100 mg capsule   Yes Yes   Sig: Take 100 mg by mouth 2 (two) times a day   fluticasone (FLOVENT HFA) 110 MCG/ACT inhaler   Yes Yes   Sig: Inhale 1 puff 2 (two) times a day glimepiride (AMARYL) 2 mg tablet   Yes Yes   Sig: Take 2 mg by mouth every morning before breakfast   levothyroxine 100 mcg tablet   Yes Yes   Sig: Take 100 mcg by mouth daily   metFORMIN (GLUCOPHAGE) 1000 MG tablet   Yes Yes   Sig: Take 1,000 mg by mouth 2 (two) times a day with meals   oxybutynin (DITROPAN-XL) 5 mg 24 hr tablet   Yes Yes   Sig: Take 5 mg by mouth daily   valsartan (DIOVAN) 160 mg tablet   Yes Yes   Sig: Take 160 mg by mouth daily      Facility-Administered Medications: None     Allergies   Allergen Reactions    Sulfa Antibiotics        Objective     Intake/Output Summary (Last 24 hours) at 03/01/18 1305  Last data filed at 03/01/18 0900   Gross per 24 hour   Intake              490 ml   Output              350 ml   Net              140 ml       Physical Exam   Constitutional: She is oriented to person, place, and time  She appears well-developed and well-nourished  HENT:   Head: Normocephalic and atraumatic  Eyes: Conjunctivae and EOM are normal  Pupils are equal, round, and reactive to light  Neck: Normal range of motion  Non-tender to posterior palpation   Cardiovascular: Normal rate  Pulmonary/Chest: Effort normal    Abdominal: Soft  She exhibits no distension  rounded   Musculoskeletal:   Sternal nodule, tender to palpation  Midline tenderness to palpation at region of L4   Neurological: She is alert and oriented to person, place, and time  She has a normal Finger-Nose-Finger Test  GCS eye subscore is 4  GCS verbal subscore is 5  GCS motor subscore is 6  Reflex Scores:       Brachioradialis reflexes are 1+ on the right side and 1+ on the left side  Patellar reflexes are 2+ on the right side and 2+ on the left side  Skin: Skin is warm and dry  Psychiatric: Her speech is normal      Neurologic Exam     Mental Status   Oriented to person, place, and time  Follows 2 step commands     Attention: normal  Concentration: normal    Speech: speech is normal   Level of consciousness: alert  Knowledge: good  Able to perform simple calculations  Normal comprehension  Cranial Nerves   Cranial nerves II through XII intact  CN III, IV, VI   Pupils are equal, round, and reactive to light  Extraocular motions are normal      Motor Exam   Muscle bulk: normal  Right arm pronator drift: absent  Left arm pronator drift: absentNo focal weakness  Grossly 5-/5 in UE and LE b/l     Sensory Exam   Light touch normal    Right leg proprioception: normal  Left leg proprioception: normal  Pinprick normal      Gait, Coordination, and Reflexes     Coordination   Finger to nose coordination: normal    Tremor   Resting tremor: absent    Reflexes   Right brachioradialis: 1+  Left brachioradialis: 1+  Right patellar: 2+  Left patellar: 2+  Right Song: absent  Left Song: absent  Right ankle clonus: absent  Left ankle clonus: absent      Vitals:Blood pressure 103/54, pulse 82, temperature 97 7 °F (36 5 °C), temperature source Oral, resp  rate 16, height 5' 1" (1 549 m), weight 67 6 kg (149 lb 0 5 oz), SpO2 93 %  ,Body mass index is 28 16 kg/m²  Lab Results:   I have personally reviewed pertinent results  Lab Results   Component Value Date    WBC 9 90 02/28/2018    HGB 11 7 02/28/2018    HCT 35 2 02/28/2018    MCV 85 02/28/2018     03/01/2018    MCH 28 1 02/28/2018    MCHC 33 2 02/28/2018    RDW 13 8 02/28/2018    MPV 9 8 03/01/2018     03/01/2018     03/01/2018    CO2 29 03/01/2018    ANIONGAP 8 03/01/2018    BUN 24 03/01/2018    CREATININE 1 33 (H) 03/01/2018    GLUCOSE 130 03/01/2018    CALCIUM 9 3 03/01/2018    AST 23 02/28/2018    ALT 26 02/28/2018    ALKPHOS 71 02/28/2018    PROT 7 8 02/28/2018    BILITOT 0 30 02/28/2018    EGFR 41 03/01/2018       Imaging Studies: I have personally reviewed pertinent reports  and I have personally reviewed pertinent films in PACS    EKG, Pathology, and Other Studies: I have personally reviewed pertinent reports        VTE Prophylaxis: Heparin    Code Status: Level 1 - Full Code  Advance Directive and Living Will:      Power of :    POLST:

## 2018-03-01 NOTE — PHYSICAL THERAPY NOTE
Physical Therapy Evaluation    Patient's Nay Phan    Today's Date:03/01/18    Patient Active Problem List   Diagnosis    Closed compression fracture of L4 lumbar vertebra (HCC)    Hematoma of frontal scalp    Hyperlipidemia    DM2 [facility]diabetes mellitus, type 2) ([facility] )    HTN (hypertension)    COPD (chronic obstructive pulmonary disease) (HCC)    Overactive bladder    Hypothyroidism    Fall (on) (from) other stairs and steps, initial encounter    Cognitive impairment    Ambulatory dysfunction    Physical deconditioning    Constipation    Acute kidney injury superimposed on chronic kidney disease ([facility] )       Past Medical History:   Diagnosis Date    COPD (chronic obstructive pulmonary disease) ([facility] )     Diabetes mellitus ([place] [address] )     Hyperlipidemia     Hypertension     Hypothyroidism     Overactive bladder        Past Surgical History:   Procedure Laterality Date    CAROTID ENDARTERECTOMY Right     NEPHRECTOMY      in her 25s          03/01/18 1350   Pain Assessment   Pain Assessment 0-10   Pain Score No Pain   Hospital Pain Intervention(s) Ambulation/increased activity   Response to Interventions unchanged   Home Living   Type of [place] to enter with rails  (stair glide indoors to upper levels)   Prior Function   Level of [place] Independent with ADLs and functional mobility  (amb w AD)   Lives With Daughter   Receives Help From Family   Restrictions/Precautions   Braces or Orthoses LSO   Other Precautions Chair Alarm; Fall Risk;Multiple lines   General   Family/Caregiver Present No   Cognition   Arousal/Participation Alert   Orientation Level Oriented to person;Oriented to place;Oriented to situation   Following Commands Follows one step commands without difficulty   RUE Assessment   RUE Assessment (funct reach and grasp)   LUE Assessment   LUE Assessment (funct reach and grasp)   RLE Assessment   RLE Assessment X   Strength RLE   RLE Overall Strength 4-/5 LLE Assessment   LLE Assessment X   Strength LLE   LLE Overall Strength 4-/5   Coordination   Movements are Fluid and Coordinated 0   Coordination and Movement Description LE instability   Bed Mobility   Supine to Sit 4  Minimal assistance   Additional items Increased time required;Verbal cues;LE management   Transfers   Sit to Stand 4  Minimal assistance   Additional items Increased time required;Verbal cues   Stand to Sit 4  Minimal assistance   Additional items Increased time required;Verbal cues   Stand pivot 4  Minimal assistance   Additional items Increased time required;Verbal cues  (RW)   Ambulation/Elevation   Gait pattern Improper Weight shift; Short stride; Excessively slow  (decreased O2 sats)   Gait Assistance 4  Minimal assist   Additional items Verbal cues; Tactile cues   Assistive Device Rolling walker   Distance 30 ft   Balance   Dynamic Sitting Fair -  (forward reach)   Static Standing Fair  (RW)   Dynamic Standing Poor +  (RW)   Endurance Deficit   Endurance Deficit Yes   Endurance Deficit Description decreased O2 sats   Activity Tolerance   Activity Tolerance Treatment limited secondary to medical complications (Comment)  (decreased O2 sats LE instability)   Nurse Made Aware [place]   Assessment   Prognosis Good   Problem List Decreased strength;Decreased range of motion;Decreased endurance; Impaired balance;Decreased mobility; Decreased coordination;Decreased safety awareness;Pain;Orthopedic restrictions;Decreased skin integrity; Impaired judgement   Assessment Pt is a 80 y o  female admitted to [facility] on 3/1/2018 w/ Closed compression fracture of L4 lumbar vertebra ([facility]); currently non-op in LSO Pt exhibits significant impairments with weakness, decreased ROM, impaired balance, decreased endurance, impaired coordination, gait deviations, pain, decreased activity tolerance, decreased functional mobility tolerance, decreased safety awareness, impaired judgement, fall risk, orthopedic restrictions and SOB upon exertion; these impact independence with mobility, ADLs, and IADLs; requires min assist w transf and amb 30 ft using RW- dista limited by decreased O2 sats to low 80s w amb on RA, pt mildly dyspneic; gait unsteady walker reliant + falls risk; objective measures on the Barthel Index also reveal limitations;  therapy prognosis is impacted by relevant co morbidities as noted in evaluation; clinical presentation is currently unstable/unpredictable - pt is on cont puilse oximetry, + GARCIA decreased O2 sats w mob, presents  w complex hx and phys impairments as noted a regression from baseline; PTA, pt was Independent with mobility lives  w dtr in multilevel w stair lift indoors and few steps to enter, fam support available; skilled PT is indicated to to optimize functional independence and discharge planning; pending functional progress, PT recommendation at discharge is Home PT  and home with family support    Goals   Patient Goals go home   STG Expiration Date 03/15/18   Short Term Goal #1 1  Independent with Bed Mobility Rolling Right and Left     2  Independent with Bed Mobility Supine-Sit     3  Modified Independent with Transfer Bed-Chair     4  Increase Dynamic Sitting Balance at least 1 Grade for improved stability with functional reach activities     5  Increase Dynamic Standing Balance at least 1 Grade for improved ease with Activities of Daily Living     6  Increase Lower Extremity Strength at least 1 Grade for improved ease mobility tasks     7  Modified Independent with  Ambulation 150 feet using RW LSO donned O2 sats WNL to facilitate home and community mobility 8  indep w don doff LSO   Plan   Treatment/Interventions Functional transfer training;LE strengthening/ROM; Elevations; Therapeutic exercise; Endurance training;Cognitive reorientation;Patient/family training;Equipment eval/education; Bed mobility;Gait training; Compensatory technique education;Continued evaluation;Spoke to nursing PT Frequency (6x/wk)   Recommendation   Recommendation Home PT; Home with family support   Equipment Recommended (pt has a walker cane and rollator)   Barthel Index   Feeding 10   Bathing 0   Grooming Score 5   Dressing Score 5   Bladder Score 5   Bowels Score 10   Toilet Use Score 5   Transfers (Bed/Chair) Score 10   Mobility (Level Surface) Score 0   Stairs Score 0   Barthel Index Score 50

## 2018-03-01 NOTE — CASE MANAGEMENT
Initial Clinical Review    Admission: Date/Time/Statement: 3/1/18 @ 0345     Orders Placed This Encounter   Procedures    Inpatient Admission     Standing Status:   Standing     Number of Occurrences:   1     Order Specific Question:   Admitting Physician     Answer:   Jennifer Cash     Order Specific Question:   Level of Care     Answer:   Med Surg [16]     Order Specific Question:   Estimated length of stay     Answer:   More than 2 Midnights     Order Specific Question:   Certification     Answer:   I certify that inpatient services are medically necessary for this patient for a duration of greater than two midnights  See H&P and MD Progress Notes for additional information about the patient's course of treatment  ED: Date/Time/Mode of Arrival: 3/1 Transfer from [address] [place], [place] [address] ED to [address] [place] [address]  Pt at [facility] from 2/28  ED Arrival Information     Expected Arrival Acuity Means of Arrival Escorted By Service Admission Type    - 3/1/2018 03:23 Emergent Ambulance [facility]ulance Trauma Urgent    Arrival Complaint    Fall          Chief Complaint:   Chief Complaint   Patient presents with    Fall     Pt was walking up stairs and fell about 11 steps  Trauma tx from [facility]       History of Illness: 80 y o  female who presents s/p fall  Patient had a mechanical fall on 2/28 while going up a flight of stairs and fell backwards down about 11 steps  Denies LOC and was helped up and ambulatory at the scene  Takes ASA daily  Evaluation in ED at [facility] revealed L4 fracture and patient transferred to [facility] for further management  C/o lower back and upper sternum pain on evaluation      ED Vital Signs:   ED Triage Vitals   Temperature Pulse Respirations Blood Pressure SpO2   03/01/18 0330 03/01/18 0330 03/01/18 0330 03/01/18 0330 03/01/18 0330   98 °F (36 7 °C) 95 18 137/61 100 %      Temp Source Heart Rate Source Patient Position - Orthostatic VS BP Location FiO2 (%)   03/01/18 0330 03/01/18 0330 03/01/18 0345 03/01/18 0432 --   Oral Monitor Lying Left arm       Pain Score       03/01/18 0328       5        Wt Readings from Last 1 Encounters:   03/01/18 67 6 kg (149 lb 0 5 oz)       Vital Signs (abnormal): WNL    Abnormal Labs:   Creatinine 0 60 - 1 30 mg/dL 1 44     Comments: Standardized to IDMS reference method   Glucose 65 - 140 mg/dL 160       Diagnostic Test Results: CT Head - No acute intracranial abnormality  Microangiopathic change  Atrophy  Scalp hematoma  CT Chest/ Abd/ Pelvis -  1   Bibasilar atelectasis or scarring  Scarring or atelectasis in the superior segment of the right lower lobe  2   Moderate compression fracture of the superior endplate of L4 with a suspected Schmorl's node  This may represent chronic or acute on chronic fracture  3   Thickening of the walls of the distal esophagus  ED Treatment:   Medication Administration from 03/01/2018 0311 to 03/01/2018 0419     None          Past Medical/Surgical History: Active Ambulatory Problems     Diagnosis Date Noted    Hyperlipidemia 03/01/2018     Resolved Ambulatory Problems     Diagnosis Date Noted    No Resolved Ambulatory Problems     Past Medical History:   Diagnosis Date    [facility]) [facility]     Diabetes mellitus ([facility] )     Hyperlipidemia     Hypertension     Hypothyroidism     Overactive bladder        Admitting Diagnosis: Knee injury [S89 [id number]]  Closed fracture of fourth lumbar vertebra, unspecified fracture morphology, initial encounter ([facility] ) [S32 [id number]]    Age/Sex: 80 y o  female    Assessment/Plan:   Trauma Alert: Evaluation  Model of Arrival: Ambulance     Trauma Active Problems:   L4 spine compression fracture  Frontal hematoma     Other problems:  DM2  HTN  COPD  CKD (prior nephrectomy over 60 years ago for ? bleeding)  HLD  Hypothyroidism  Overactive bladder      Admission Orders:  Spine Brace  Neurosurgery cons  Gerontology cons  PT/OT eval and treat    Scheduled Meds:   Current Facility-Administered Medications:  acetaminophen 650 mg Oral Q8H [address]   atorvastatin 40 mg Oral Daily   calcium carbonate-vitamin D 2 tablet Oral Daily With Breakfast   docusate sodium 100 mg Oral BID   fluticasone 1 puff Inhalation BID   fluticasone-salmeterol 1 puff Inhalation Q12H [address]   heparin (porcine) 5,000 Units Subcutaneous Q8H [address]   insulin glargine 10 Units Subcutaneous HS   insulin lispro 1-6 Units Subcutaneous HS   insulin lispro 2-12 Units Subcutaneous TID AC   levothyroxine 100 mcg Oral Early Morning   lidocaine 1 patch Transdermal Daily   senna 2 tablet Oral HS   valsartan 160 mg Oral Daily     Continuous Infusions:   sodium chloride 75 mL/hr Last Rate: Stopped (03/01/18 1410)     PRN Meds:   bisacodyl    ondansetron    oxyCODONE po x1  Dilaudid Iv x1

## 2018-03-01 NOTE — SOCIAL WORK
CM met with pt to discuss the role of CM  Pt lives with her dtr in a townhouse which has 2STE and a stair glide inside  Pt owns a cane, walker, BSC, and shower seat  Pt doesn't drive and states she was IP PTA  Pt's pharmacy is [facility] in [address]  Pt has no hx of mental health  Substance abuse, or IP rehab  Pt owns a living will   CM will follow for recs

## 2018-03-01 NOTE — EMTALA/ACUTE CARE TRANSFER
[address] [address] [address]  Dept: [phone]      EMTALA TRANSFER CONSENT    NAME Elmon Callow                                         1928                              MRN [id number]    I have been informed of my rights regarding examination, treatment, and transfer   by Dr Governor Manav DO    Benefits: Specialized equipment and/or services available at the receiving facility (Include comment)________________________    Risks: Potential for delay in receiving treatment, Potential deterioration of medical condition, Loss of IV, Increased discomfort during transfer, Possible worsening of condition or death during transfer      Consent for Transfer:  I acknowledge that my medical condition has been evaluated and explained to me by the emergency department physician or other qualified medical person and/or my attending physician, who has recommended that I be transferred to the service of  Accepting Physician: Dr Carmenza Sam at [address] [facility] Name, [address] : [place], [place]  The above potential benefits of such transfer, the potential risks associated with such transfer, and the probable risks of not being transferred have been explained to me, and I fully understand them  The doctor has explained that, in my case, the benefits of transfer outweigh the risks  I agree to be transferred  I authorize the performance of emergency medical procedures and treatments upon me in both transit and upon arrival at the receiving facility  Additionally, I authorize the release of any and all medical records to the receiving facility and request they be transported with me, if possible  I understand that the safest mode of transportation during a medical emergency is an ambulance and that the Hospital advocates the use of this mode of transport   Risks of traveling to the receiving facility by car, including absence of medical control, life sustaining equipment, such as oxygen, and medical personnel has been explained to me and I fully understand them  (LACIE CORRECT BOX BELOW)  [  ]  I consent to the stated transfer and to be transported by ambulance/helicopter  [  ]  I consent to the stated transfer, but refuse transportation by ambulance and accept full responsibility for my transportation by car  I understand the risks of non-ambulance transfers and I exonerate the Hospital and its staff from any deterioration in my condition that results from this refusal     X___________________________________________    DATE  18  TIME________  Signature of patient or legally responsible individual signing on patient behalf           RELATIONSHIP TO PATIENT_________________________          Provider Certification    NAME Josee Figueroa                                        [facility] 1928                              MRN [id number]    A medical screening exam was performed on the above named patient  Based on the examination:    Condition Necessitating Transfer The primary encounter diagnosis was Traumatic compression fracture of L4 lumbar vertebra ([facility] [address] )  Diagnoses of Hematoma of frontal scalp, initial encounter and Pulmonary nodule were also pertinent to this visit  Patient Condition:  An emergency transfer is being made prior to stabilization due to the need for definitive care and the benefit of transfer outweighs the risk    Reason for Transfer: Level of Care needed not available at this facility    Transfer Requirements: [address] [place] [address]   · Space available and qualified personnel available for treatment as acknowledged by    · Agreed to accept transfer and to provide appropriate medical treatment as acknowledged by       Dr Graciela Holguin  · Appropriate medical records of the examination and treatment of the patient are provided at the time of transfer   [address], [address] _______  · Transfer will be performed by qualified personnel from    and appropriate transfer equipment as required, including the use of necessary and appropriate life support measures  Provider Certification: I have examined the patient and explained the following risks and benefits of being transferred/refusing transfer to the patient/family:  General risk, such as traffic hazards, adverse weather conditions, rough terrain or turbulence, possible failure of equipment (including vehicle or aircraft), or consequences of actions of persons outside the control of the transport personnel      Based on these reasonable risks and benefits to the patient and/or the unborn child(simon), and based upon the information available at the time of the patients examination, I certify that the medical benefits reasonably to be expected from the provision of appropriate medical treatments at another medical facility outweigh the increasing risks, if any, to the individuals medical condition, and in the case of labor to the unborn child, from effecting the transfer      X____________________________________________ DATE 03/01/18        TIME_______      ORIGINAL - SEND TO MEDICAL RECORDS   COPY - SEND WITH PATIENT DURING TRANSFER

## 2018-03-01 NOTE — PROGRESS NOTES
Patient medicated for nausea and vomiting with Zofran  No improvement continues to vomit and will try compazine suppository at this time

## 2018-03-01 NOTE — ORTHOTIC NOTE
Orthotic Note            Date: 3/1/2018     Time: 7:44-8:17    Patient Name: Amanda Baron     LSO ordered by Deyvi Giang MD        Reason for Consult:  Patient Active Problem List   Diagnosis    Closed compression fracture of L4 lumbar vertebra ([facility] [address] )    Hematoma of frontal scalp    Hyperlipidemia    DM2 (diabetes mellitus, type 2) ([facility] [address] )    HTN (hypertension)    [facility]    Overactive bladder    CKD (chronic kidney disease)    Hypothyroidism     Measured, adjusted LSO to a size large, and donned LSO on the pt  Pt then assisted to sitting on the edge of the bed  Pt able to demonstrate doffing/donning the brace with verbal instructions  A little difficulty with getting the waist belt tight, but pt states she will be discharging to live with others and they will be able to assist her  Pt requesting to return to laying in the bed  LSO left on the patient as the head of the bed is greater than 30*  Instructed the pt she needs to wear the brace any time she is out of the bed or if the head of the bed is greater than 30*  Informed the pt she needs to wear a shirt under the brace when she is at home so the brace is not right up against her skin  Pt did not have any questions at this time  Pt's nurse, Karoline Perkins , is aware the brace is on the patient  Recommendations:  Please call the ortho tech, ext [phone], with any questions and/or adjustments       GEOVANNA Dixon, PTA, , Restorative Technician

## 2018-03-01 NOTE — CONSULTS
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  Patient may follow up at [facility] upon discharge for formal cognitive assessment if the family would like, will place discharge  Will defer checking B12, folate at this time as patient has no signs of microcytic anemia    3  Ambulatory dysfunction  Patient has both cane and walker, uses both  Continue with home vitamin D supplement  PT, OT  Patient may benefit from rehab to improve gait stability and strength    4  Deconditioning  Secondary to injuries, hospital stay  Mobilize frequently to prevent further decline  PT, OT    5  Delirium precautions  Agree with Tylenol 650 mg Q 8, low-dose oxycodone , will add Lidoderm patch   Continue with Colace, patient reports constipation and bowel sounds are somewhat hypoactive, will order Dulcolax suppository  Monitor for urinary retention  Will discontinue oxybutynin, do not continue at discharge  Redirect unwanted patient behaviors as first line tx  Reorient patient frequently  Avoid deliriogenic meds including tramadol, benzodiazepines, benadryl  Good sleep hygiene important, limit night time interruptions  Encourage patient to stay awake during the day  Ensure adequate hydration/nutrition  Mobilize often     6  L4 fracture  See 5  For pain control  In LSO brace  NSx consulted    7   Constipation   +vomiting this AM  Dulcolax added  Continue with colace   Consider KUB if vomiting persists     8  Frontal hematoma   Hb stable  Trauma following          History of Present Illness   Physician Requesting Consult: Vicki Stanley DO  Reason for Consult / Principal Problem: isar  Hx and PE limited by: n/a  HPI: Karina Morales is a 80y o  year old female who presents to [facility] after suffering fall down about 11 steps  Patient initially presented to [facility], was found to have L4 fracture, frontal hematoma, transferred to [facility]  Admitted under the trauma team  Patient reports some constipation  Prior to arrival patient lived alone, but was planning to move in with her daughter     Daughter assisted with ADLs and IADLs  Patient reports only both a cane and a walker and using both  Patient reports other falls  Daughter reports patient has had problems with her memory, and has seen neurologist to reported hardening of the arteries in the brain  Neurology stated that this could lead to dementia, and if patient got much worse, to bring patient back into Neurology office  Daughter reports patient's memory declining since patient's  had stroke last year  Daughter reports patient getting lost in her own home  Upon exam patient is alert and oriented to person, place, month, year  Unsure of date, day, [facility]   Scores 2/3 on delayed recall  0/5 on abstract thinking  No signs of delirium  Inpatient consult to Gerontology  Consult performed by: Rosezella Alpers ordered by: BLAYNE Enriquez          Review of Systems   Respiratory: Negative for chest tightness and shortness of breath  Cardiovascular: Negative for chest pain and palpitations  Gastrointestinal: Positive for vomiting  Negative for abdominal distention, abdominal pain, constipation, diarrhea and nausea  Musculoskeletal: Positive for myalgias  Psychiatric/Behavioral: Negative for agitation, behavioral problems, confusion and hallucinations     All other systems reviewed and are negative        Historical Information   Past Medical History:   Diagnosis Date    COPD [facility]chronic [facility] disease) [facility] [address] )     [facility]     Hyperlipidemia     Hypertension     Hypothyroidism     Overactive bladder      Past Surgical History:   Procedure Laterality Date    CAROTID ENDARTERECTOMY Right     NEPHRECTOMY      in her 25s     Social History   History   Alcohol Use No     History   Drug Use No     History   Smoking Status    Former Smoker   Smokeless Tobacco    Never Used         Family History: non-contributory    Meds/Allergies   Current meds:   Current Facility-Administered Medications   Medication Dose Route Frequency    acetaminophen (TYLENOL) tablet 650 mg  650 mg Oral Q6H PRN    atorvastatin (LIPITOR) tablet 40 mg  40 mg Oral Daily    calcium carbonate-vitamin D (OSCAL-D) 500 mg-200 units per tablet 2 tablet  2 tablet Oral Daily With Breakfast    docusate sodium (COLACE) capsule 100 mg  100 mg Oral BID    fluticasone (FLOVENT HFA) 110 MCG/ACT inhaler 1 puff  1 puff Inhalation BID    fluticasone-salmeterol (ADVAIR) 100-50 mcg/dose inhaler 1 puff  1 puff Inhalation Q12H [address]    heparin (porcine) subcutaneous injection 5,000 Units  5,000 Units Subcutaneous Q8H [address]    HYDROmorphone (DILAUDID) injection 0 5 mg  0 5 mg Intravenous Q3H PRN    insulin glargine (LANTUS) subcutaneous injection 10 Units  10 Units Subcutaneous HS    insulin lispro (HumaLOG) 100 units/mL subcutaneous injection 1-5 Units  1-5 Units Subcutaneous TID AC    insulin lispro (HumaLOG) 100 units/mL subcutaneous injection 1-5 Units  1-5 Units Subcutaneous HS    insulin lispro (HumaLOG) 100 units/mL subcutaneous injection 3 Units  3 Units Subcutaneous Daily With Breakfast    insulin lispro (HumaLOG) 100 units/mL subcutaneous injection 3 Units  3 Units Subcutaneous Daily With Lunch    insulin lispro (HumaLOG) 100 units/mL subcutaneous injection 3 Units  3 Units Subcutaneous Daily With Dinner    levothyroxine tablet 100 mcg  100 mcg Oral Early Morning    ondansetron (ZOFRAN) injection 4 mg  4 mg Intravenous Q6H PRN    oxybutynin (DITROPAN-XL) 24 hr tablet 5 mg  5 mg Oral Daily    oxyCODONE (ROXICODONE) IR tablet 2 5 mg  2 5 mg Oral Q4H PRN    oxyCODONE (ROXICODONE) IR tablet 5 mg  5 mg Oral Q4H PRN    sodium chloride 0 9 % infusion  75 mL/hr Intravenous Continuous    valsartan (DIOVAN) tablet 160 mg  160 mg Oral Daily      Current PTA meds:  Prescriptions Prior to Admission   Medication    aspirin (ECOTRIN LOW STRENGTH) 81 mg EC tablet    atorvastatin (LIPITOR) 40 mg tablet    Calcium Carb-Cholecalciferol (CALTRATE 600+D3 SOFT PO)    docusate sodium (COLACE) 100 mg capsule    fluticasone (FLOVENT HFA) 110 MCG/ACT inhaler    glimepiride (AMARYL) 2 mg tablet    levothyroxine 100 mcg tablet    metFORMIN (GLUCOPHAGE) 1000 MG tablet    oxybutynin (DITROPAN-XL) 5 mg 24 hr tablet    Umeclidinium-Vilanterol (ANORO ELLIPTA) 62 5-25 MCG/INH AEPB    valsartan (DIOVAN) 160 mg tablet        Allergies   Allergen Reactions    Sulfa Antibiotics        Objective   Vitals: Blood pressure 103/54, pulse 82, temperature 97 7 °F (36 5 °C), temperature source Oral, resp  rate 16, height 5' 1" (1 549 m), weight 67 6 kg (149 lb 0 5 oz), SpO2 93 %  ,Body mass index is 28 16 kg/m²  Physical Exam   Constitutional: She is oriented to person, place, and time  She appears well-developed and well-nourished  No distress  HENT:   Head: Normocephalic and atraumatic  Mouth/Throat: No oropharyngeal exudate  Eyes: Conjunctivae and EOM are normal  No scleral icterus  Neck: Neck supple  Cardiovascular: Normal rate  Pulmonary/Chest: Effort normal and breath sounds normal  She has no wheezes  She has no rales  Abdominal: Soft  Hypoactive bs   Musculoskeletal: She exhibits no edema  Neurological: She is alert and oriented to person, place, and time     Skin: Skin is warm and dry    Psychiatric: She is not agitated and not actively hallucinating  Cognition and memory are impaired  7/10 orientation  3/3 naming  2/3 delayed recall  0/5 abstract thinking    +cognitive impairment, no signs of delirium  Nursing note and vitals reviewed  Lab Results:   Results from last 7 days  Lab Units 03/01/18  0519 02/28/18  2310   WBC Thousand/uL  --  9 90   HEMOGLOBIN g/dL  --  11 7   HEMATOCRIT %  --  35 2   PLATELETS Thousands/uL 200 200        Results from last 7 days  Lab Units 03/01/18  0519 02/28/18  2310   SODIUM mmol/L 142 139   POTASSIUM mmol/L 4 0 4 0   CHLORIDE mmol/L 105 103   CO2 mmol/L 29 30   BUN mg/dL 24 24   CREATININE mg/dL 1 33* 1 44*   CALCIUM mg/dL 9 3 9 6   TOTAL PROTEIN g/dL  --  7 8   BILIRUBIN TOTAL mg/dL  --  0 30   ALK PHOS U/L  --  71   ALT U/L  --  26   AST U/L  --  23   GLUCOSE RANDOM mg/dL 130 160*       Imaging Studies: I have personally reviewed pertinent reports  EKG, Pathology, and Other Studies: I have personally reviewed pertinent reports  VTE Prophylaxis: Sequential compression device (Venodyne)     Code Status: Level 1 - Full Code      Counseling/Coordination of Care: Total floor / unit time spent today 25 minutes  Greater than 50% of total time was spent with the patient and / or family counseling and / or coordination of care  A description of the counseling / coordination of care: Assessing examine the patient, reviewing EMR meds, speaking to nursing staff, speaking to daughter on phone, speaking to trauma team, assessing cognition and for depression

## 2018-03-01 NOTE — ASSESSMENT & PLAN NOTE
- uncontrolled  Home metformin and glyburide on hold with occasional vomiting    - continue lantus and mealtime coverage as ordered  - increase SSI to algorithm 4 due to elevated BS of 265 today

## 2018-03-01 NOTE — ED PROVIDER NOTES
History  Chief Complaint   Patient presents with    Fall     pt fell down flight of stairs tonight  approx 11 steps per family  pt denies LOC  pt states she went to reach for banister and fell   baby Aspirin daily  +hematoma to forehead     Pt with med hx of DM and HTN, in ER with daughter after a witnessed fall  Per pt's daughter, pt was ambulating up the stairs, when she reached for the banister, missed it, and fell backwards 11 steps  Pt's daughter denies LOC, pt was able to ambulate with assistance after the fall  Pt now with c/o diffuse pain  + frontal hematoma secondary to fall  Pt is currently awake and alert  None       Past Medical History:   Diagnosis Date    [facility]     Diabetes mellitus ([facility] )     Hyperlipidemia     Hypertension     Hypothyroidism     Overactive bladder        Past Surgical History:   Procedure Laterality Date    CAROTID ENDARTERECTOMY Right     NEPHRECTOMY      in her 25s       Family History   Problem Relation Age of Onset    Family history unknown: Yes     I have reviewed and agree with the history as documented  Social History   Substance Use Topics    Smoking status: Former Smoker    Smokeless tobacco: Never Used    Alcohol use No        Review of Systems   Respiratory: Negative for cough, chest tightness and shortness of breath  Cardiovascular: Positive for chest pain  Gastrointestinal: Positive for abdominal pain  Neurological: Negative for dizziness, syncope, facial asymmetry, light-headedness and headaches  Psychiatric/Behavioral: Negative for confusion and decreased concentration  All other systems reviewed and are negative        Physical Exam  ED Triage Vitals   Temperature Pulse Respirations Blood Pressure SpO2   02/28/18 1949 02/28/18 1949 02/28/18 1949 02/28/18 1949 02/28/18 1949   98 2 °F (36 8 °C) (!) 111 20 (!) 189/88 98 %      Temp Source Heart Rate Source Patient Position - Orthostatic VS BP Location FiO2 (%)   02/28/18 1949 02/28/18 2350 02/28/18 2350 02/28/18 2350 --   Oral Monitor Lying Left arm       Pain Score       02/28/18 1949       9           Orthostatic Vital Signs  Vitals:    02/28/18 1949 02/28/18 2350 03/01/18 0102 03/01/18 0251   BP: (!) 189/88 (!) 182/83 (!) 196/84 130/63   Pulse: (!) 111 98 104 96   Patient Position - Orthostatic VS:  Lying Lying Lying       Physical Exam   Constitutional: She is oriented to person, place, and time  She appears well-developed and well-nourished  HENT:   Head: Normocephalic  Head is with contusion  Mouth/Throat: Oropharynx is clear and moist    Eyes: Conjunctivae and EOM are normal  Pupils are equal, round, and reactive to light  Neck: Neck supple  Spinous process tenderness and muscular tenderness present  Midline tenderness from C1 - C6  + paraspinal and lateral tenderness   Cardiovascular: Normal rate, regular rhythm and normal heart sounds  Pulmonary/Chest: Effort normal and breath sounds normal  She exhibits tenderness  Right upper chest - TTP   Abdominal: Soft  Bowel sounds are normal  She exhibits distension  There is tenderness in the left upper quadrant  Musculoskeletal: She exhibits tenderness  She exhibits no edema or deformity  Right knee: She exhibits bony tenderness  She exhibits normal range of motion, no swelling, no effusion, no ecchymosis, no deformity, no laceration, no erythema, normal alignment, no LCL laxity, normal patellar mobility, normal meniscus and no MCL laxity  Left knee: She exhibits bony tenderness  She exhibits normal range of motion, no swelling, no effusion, no ecchymosis, no deformity, no laceration, no erythema, normal alignment, no LCL laxity, normal patellar mobility, normal meniscus and no MCL laxity  Neurological: She is alert and oriented to person, place, and time  Psychiatric: She has a normal mood and affect   Her speech is normal and behavior is normal  Judgment normal  She exhibits normal recent memory and normal remote memory  Nursing note and vitals reviewed  ED Medications  Medications   morphine injection 2 mg (2 mg Intravenous Given 3/1/18 0219)       Diagnostic Studies  Results Reviewed     Procedure Component Value Units Date/Time    Comprehensive metabolic panel [[id number]]  (Abnormal) Collected:  02/28/18 2310    Lab Status:  Final result Specimen:  Blood from Arm, Right Updated:  02/28/18 2331     Sodium 139 mmol/L      Potassium 4 0 mmol/L      Chloride 103 mmol/L      CO2 30 mmol/L      Anion Gap 6 mmol/L      BUN 24 mg/dL      Creatinine 1 44 (H) mg/dL      Glucose 160 (H) mg/dL      Calcium 9 6 mg/dL      AST 23 U/L      ALT 26 U/L      Alkaline Phosphatase 71 U/L      Total Protein 7 8 g/dL      Albumin 3 2 (L) g/dL      Total Bilirubin 0 30 mg/dL      eGFR 37 ml/min/1 73sq m     Narrative:         National Kidney Disease Education Program recommendations are as follows:  GFR calculation is accurate only with a steady state creatinine  Chronic Kidney disease less than 60 ml/min/1 73 sq  meters  Kidney failure less than 15 ml/min/1 73 sq  meters      CBC and differential [[id number]]  (Normal) Collected:  02/28/18 2310    Lab Status:  Final result Specimen:  Blood from Arm, Right Updated:  02/28/18 2315     WBC 9 90 Thousand/uL      RBC 4 16 Million/uL      Hemoglobin 11 7 g/dL      Hematocrit 35 2 %      MCV 85 fL      MCH 28 1 pg      MCHC 33 2 g/dL      RDW 13 8 %      MPV 9 6 fL      Platelets 532 Thousands/uL      Neutrophils Relative 70 %      Lymphocytes Relative 21 %      Monocytes Relative 6 %      Eosinophils Relative 3 %      Basophils Relative 0 %      Neutrophils Absolute 6 94 Thousands/µL      Lymphocytes Absolute 2 05 Thousands/µL      Monocytes Absolute 0 56 Thousand/µL      Eosinophils Absolute 0 33 Thousand/µL      Basophils Absolute 0 02 Thousands/µL                  XR knee 4+ views Right injury   ED Interpretation by Josiah Sifuentes DO Frida (02/28 2342)   nad      Final Result by Iqra Brennan MD (03/01 0840)      No acute osseous abnormality  Workstation performed: [id number]         XR knee 4+ views left injury   ED Interpretation by Zenaida Elizalde DO (02/28 2342)   nad      Final Result by Iqra Brennan MD (03/01 0840)      No acute osseous abnormality  Workstation performed: [id number]         CT recon only thoracolumbar   Final Result by Sorin Carter MD (03/01 7626)    IMPRESSION:               No fracture or traumatic subluxation  Degenerative changes  Workstation performed: [id number]         CT chest abdomen pelvis wo contrast   Final Result by Sorin Carter MD (03/01 3332)         1  Bibasilar atelectasis or scarring  Scarring or atelectasis in the superior segment of the right lower lobe  2   Moderate compression fracture of the superior endplate of L4 with a suspected Schmorl's node  This may represent chronic or acute on chronic fracture  3   Thickening of the walls of the distal esophagus  Possibly could be secondary to a hiatal hernia  If clinically appropriate, consider endoscopy or barium swallow  Workstation performed: [id number]         CT facial bones without contrast   Final Result by Sorin Carter MD (23/54 7034)         1  No acute fracture  2    Old left nasal bone fracture  3   Scalp hematoma  Workstation performed: [id number]         CT cervical spine without contrast   Final Result by Sorin Carter MD (03/01 1573)      No cervical spine fracture or traumatic malalignment  Degenerative changes  Workstation performed: [id number]         CT head without contrast   Final Result by Sorin Carter MD (03/01 6786)      No acute intracranial abnormality  Microangiopathic change  Atrophy  Scalp hematoma     Findings are consistent with the preliminary report from [facility] which was provided shortly after completion of the exam             Workstation performed: [id number]                    Procedures  Procedures       Phone Contacts  ED Phone Contact    ED Course  ED Course                                MDM  Number of Diagnoses or Management Options  Diagnosis management comments: CT chest:  FINDINGS:  Lungs: Chronic appearing interstitial changes/fibrosis with atelectasis in the right lower lobe  Atelectasis or scarring in the lung bases  0 4 cm nodule in the right middle lobe  Pleural space: Within normal limits  No pneumothorax  No significant effusion  Heart: Within normal limits  No cardiomegaly  No significant pericardial effusion  Bones/joints: Within normal limits  No acute fracture  No dislocation  Soft tissues: Within normal limits  Vasculature: Within normal limits  No thoracic aortic aneurysm  Lymph nodes: Within normal limits  No enlarged lymph nodes  IMPRESSION:  No acute findings  CT Abdomen/pelvis:  FINDINGS:  Lower thorax: Please see concurrent report for CT of the chest   ABDOMEN:  Liver: Within normal limits  Gallbladder and bile ducts: Within normal limits  No calcified stones  No ductal dilation  Pancreas: Within normal limits  No ductal dilation  Spleen: Within normal limits  No splenomegaly  Adrenals: Within normal limits  No mass  Kidneys and ureters: Absent left kidney  No hydronephrosis  Stomach and bowel: Diffuse large colonic stool burden can be correlated for constipation  No  obstruction  No mucosal thickening  Appendix: No findings to suggest acute appendicitis  PELVIS:  Bladder: Within normal limits  No stones  Reproductive: Hysterectomy  ABDOMEN and PELVIS:  Intraperitoneal space: Linear soft tissue density in the right iliac fossa fat without surrounding fat  stranding favored to be chronic change, but correlate for tenderness which would support acute  traumatic injury  No free air  No significant fluid collection    Bones/joints: Decreased bone density can be correlated for osteopenia versus osteoporosis  Moderate compression fracture of L4 appears acute or acute on chronic  No dislocation  Soft tissues: Within normal limits  Vasculature: Moderate aortoiliac atherosclerosis  No aortic aneurysm  Lymph nodes: Within normal limits  No enlarged lymph nodes  Tubes, lines and devices:  IMPRESSION:  Moderate compression fracture of L4 appears acute or acute on chronic  Linear soft tissue density in the right iliac fossa fat favored to be chronic change, but correlate for  tenderness which would support acute traumatic injury  CT head:  FINDINGS:  Brain: Cerebral and cerebellar volume loss  Chronic white matter microvascular ischemia  No loss of  gray-white matter differentiation  No acute hemorrhage  No mass effect or midline shift  Ventricles: Ventricular size is concordant with degree of volume loss  Bones/joints: Within normal limits  No acute fracture  Soft tissues: Left frontal and hemispheric scalp hematoma  Sinuses: Maxillary mucosal thickening  No air-fluid levels  Mastoid air cells: Trace right mastoid opacification  IMPRESSION:  No acute intracranial hemorrhage  CT c-spine:  FINDINGS:  Vertebrae: Multilevel disc disease and degenerative changes with disc osteophyte complexes from  C2-3 through C6-7 and ligamentum flavum calcification resulting in spinal canal and neural foraminal  stenosis  Incidental nonunion of posterior arch of C1  No acute fracture  Cervical ribs noted at C7,  rudimentary and the left  Discs/spinal canal/neural foramina: See above  Soft tissues: Within normal limits  Lung apices: Unremarkable as visualized  IMPRESSION:  No acute fracture or malalignment  CT maxillofacial:    FINDINGS:  Bones/joints: No acute fracture  Soft tissues: Left nasal bone deformity without overlying soft tissue swelling appears chronic  Orbits: Within normal limits  Sinuses: Maxillary mucosal thickening   No air-fluid levels  Mastoid air cells: Trace right mastoid opacification  IMPRESSION:  No acute facial fracture  CT thoracic spine:  FINDINGS:  Vertebrae: Multilevel degenerative changes  No acute fracture  Discs/spinal canal/neural foramina: No acute findings  No significant spinal canal stenosis  Soft tissues: Within normal limits  IMPRESSION:  No acute fracture or malalignment  CritCare Time    Disposition  Final diagnoses:   Traumatic compression fracture of L4 lumbar vertebra (HCC)   Hematoma of frontal scalp, initial encounter   Pulmonary nodule     Time reflects when diagnosis was documented in both MDM as applicable and the Disposition within this note     Time User Action Codes Description Comment    3/1/2018  2:11 AM German Dash Add [B93 773R] Traumatic compression fracture of L4 lumbar vertebra ([address] )     3/1/2018  2:11 AM German DAWKINS Add [S00 03XA] Hematoma of frontal scalp, initial encounter     3/1/2018  2:12 AM German Dash Add [R91 1] Pulmonary nodule       ED Disposition     ED Disposition Condition Comment    Transfer to Another [address] [place] [address] should be transferred out to [facility] - Dr Marcie Ivy (trauma)        MD Documentation    Flowsheet Row Most Recent Value   Patient Condition  An emergency transfer is being made prior to stabilization due to the need for definitive care and the benefit of transfer outweighs the risk   Reason for Transfer  Level of Care needed not available at this facility   Benefits of Transfer  Specialized equipment and/or services available at the receiving facility (Include comment)________________________   Risks of Transfer  Potential for delay in receiving treatment, Potential deterioration of medical condition, Loss of IV, Increased discomfort during transfer, Possible worsening of condition or death during transfer   Accepting Physician  Dr Michael Cunningham Name, Jong Patino MD, Dr Provider Certification  General risk, such as traffic hazards, adverse weather conditions, rough terrain or turbulence, possible failure of equipment (including vehicle or aircraft), or consequences of actions of persons outside the control of the transport personnel      RN Documentation    [address] Name, [address], [place]      Follow-up Information    None       There are no discharge medications for this patient  No discharge procedures on file      ED Provider  Electronically Signed by           Presley Macias DO  03/06/18 0800

## 2018-03-01 NOTE — OCCUPATIONAL THERAPY NOTE
[address]  Evaluation     Patient Name: Stefanie Bosworth  Today's Date: 3/1/2018  Problem List  Patient Active Problem List   Diagnosis    Closed compression fracture of L4 lumbar vertebra (HCC)    Hematoma of frontal scalp    Hyperlipidemia    DM2 (diabetes mellitus, type 2) ([address] )    HTN (hypertension)    COPD (chronic obstructive pulmonary disease) ([address]    Overactive bladder    Hypothyroidism    Fall (on) (from) other stairs and steps, initial encounter    Cognitive impairment    Ambulatory dysfunction    Physical deconditioning    Constipation    Acute kidney injury superimposed on chronic kidney disease ([address] )     Past Medical History  Past Medical History:   Diagnosis Date    [facility]     Diabetes mellitus ([facility]     Hyperlipidemia     Hypertension     Hypothyroidism     Overactive bladder      Past Surgical History  Past Surgical History:   Procedure Laterality Date    CAROTID ENDARTERECTOMY Right     NEPHRECTOMY      in her 25s         03/01/18 1500   Note Type   Note type Eval only   Restrictions/Precautions   Weight Bearing Precautions Per Order No   Braces or Orthoses LSO   Other Precautions Spinal precautions; Fall Risk   Pain Assessment   Pain Assessment 0-10   Pain Score 7   Pain Type Acute pain   Pain Location Head   Hospital Pain Intervention(s) Ambulation/increased activity; Emotional support;Repositioned   Home Living   Type of Home House   Home Layout Two level  (2 NESS)   Bathroom Shower/Tub Tub/shower unit   Bathroom Toilet Raised   Bathroom Equipment Shower chair;Commode   Bathroom Accessibility Accessible   Home Equipment Cane;Walker;Stair glide  (rollator)   Prior Function   Level of [place] Independent with ADLs and functional mobility   Lives With Daughter   Receives Help From Family   ADL Assistance Independent   IADLs Independent   Falls in the last 6 months (did not identify specific number but reports multiple falls)   Vocational Retired   Lifestyle   Autonomy PTA pt reports I in ADLs/IADLs/functional mobility  Pt reports owning DME but not always using DME for functional mobilityh   Reciprocal Relationships pt has supportive dtr    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   Intrinsic Gratification pt enjoys crocheting   Psychosocial   Psychosocial (WDL) [address] "I don't always use my rollator in the house, sometimes I just leave it if I am only going to the other room"   ADL   Where [address] 5  Supervision/Setup   Grooming Assistance 5  Supervision/Setup   [facility] [address] 5  Supervision/Setup    [address] 5  Supervision/Setup    [facility] 4  [address] [place]   5  [facility]   Additional Comments pt oob in chair upon arrival   Transfers   Sit to [address]   Additional items Verbal cues   Stand to Sit 5  Supervision   Additional items Verbal cues   Functional Mobility   Functional Mobility 4  Minimal assistance   Additional Comments CG   Additional items Rolling walker   Balance   Static Sitting Fair +   Dynamic Sitting Fair   Static Standing Fair   Dynamic Standing [address], [address] -   Activity Tolerance   Activity Tolerance Patient limited by fatigue   Nurse Made Aware okay to see   RUE Assessment   RUE Assessment WFL   LUE Assessment   LUE Assessment WFL   Hand Function   Gross Motor Coordination Functional   Fine Motor Coordination Functional   Cognition   Overall Cognitive Status Impaired   Arousal/Participation Alert; Responsive; Cooperative   Attention Within functional limits   Orientation Level Oriented X4   Memory Decreased recall of recent events   Following Commands Follows one step commands without difficulty   Assessment   Limitation Decreased ADL status; Decreased Safe judgement during ADL;Decreased endurance;Decreased self-care trans;Decreased high-level ADLs; Decreased cognition   Prognosis Good   Assessment Pt is a 79 yo F admitted to B w/ L4 fx and frontal scalp hematom 2* fall down stairs  Pt is OOB w/ LSO per orders  Pt reports numerous falls over past 6 months, but is unable to identify a number  Pt's PMH significant for HTN, HLD, DM , COPD, hypothyroidism, and hx of carotid endartectomy  PTA pt reports I in ADLs/IADLs  Pt reports owning rollator walker but reports she does not always use it  Pt reports she also owns SPC, BSC, and Sc  Pt reports she does not drive  Currently pt requires Min A (CG) for functional mobility w/ use of RW   Pt is Min A for LB dressing/balance 2* decreased balance  Pt reports dtr will be helping her at home  Pt is SBA for all other ADLs  Pt educated on spinal precautions- no bending, lifting, or twisting in order to prevent more strain on the back  Pt education completed in front of dtr  Pt is limited 2* decreased activity tolerance, decreased safety awareness, pain, and impaired balance  From OT standpoint, anticipate d/c home w/ family support  No further acute needs at this time, d/c OT       Goals   Patient Goals go home   Recommendation   OT Discharge Recommendation Home with family support   OT - OK to Discharge Yes  (when medically stable)   Barthel Index   Feeding 10   Bathing 0   Grooming Score 5   Dressing Score 5   Bladder Score 10   Bowels Score 10   Toilet Use Score 5   Transfers (Bed/Chair) Score 10   Mobility (Level Surface) Score 10   Stairs Score 0   Barthel Index Score 65   Modified [place] Scale   Modified Bethany Scale 3     Meghan Grossman, ENMANUEL

## 2018-03-01 NOTE — ED NOTES
C-Collar applied  Pt c/o neck pain  Hematoma to forehead noted-ice pack applied  Pt with tenderness to lower back/coccyx  Palpable lump at base of spine   Ice pack applied       Tati Lima RN  03/01/18 3702

## 2018-03-02 VITALS
BODY MASS INDEX: 28.14 KG/M2 | RESPIRATION RATE: 16 BRPM | DIASTOLIC BLOOD PRESSURE: 60 MMHG | WEIGHT: 149.03 LBS | HEIGHT: 61 IN | TEMPERATURE: 98.4 F | OXYGEN SATURATION: 98 % | HEART RATE: 103 BPM | SYSTOLIC BLOOD PRESSURE: 123 MMHG

## 2018-03-02 LAB
ANION GAP SERPL CALCULATED.3IONS-SCNC: 7 MMOL/L (ref 4–13)
BASOPHILS # BLD AUTO: 0.02 THOUSANDS/ΜL (ref 0–0.1)
BASOPHILS NFR BLD AUTO: 0 % (ref 0–1)
BUN SERPL-MCNC: 28 MG/DL (ref 5–25)
CALCIUM SERPL-MCNC: 8.5 MG/DL (ref 8.3–10.1)
CHLORIDE SERPL-SCNC: 106 MMOL/L (ref 100–108)
CO2 SERPL-SCNC: 28 MMOL/L (ref 21–32)
CREAT SERPL-MCNC: 1.59 MG/DL (ref 0.6–1.3)
EOSINOPHIL # BLD AUTO: 0.33 THOUSAND/ΜL (ref 0–0.61)
EOSINOPHIL NFR BLD AUTO: 5 % (ref 0–6)
ERYTHROCYTE [DISTWIDTH] IN BLOOD BY AUTOMATED COUNT: 13.9 % (ref 11.6–15.1)
GFR SERPL CREATININE-BSD FRML MDRD: 33 ML/MIN/1.73SQ M
GLUCOSE SERPL-MCNC: 128 MG/DL (ref 65–140)
GLUCOSE SERPL-MCNC: 133 MG/DL (ref 65–140)
GLUCOSE SERPL-MCNC: 204 MG/DL (ref 65–140)
GLUCOSE SERPL-MCNC: 220 MG/DL (ref 65–140)
GLUCOSE SERPL-MCNC: 71 MG/DL (ref 65–140)
HCT VFR BLD AUTO: 29.6 % (ref 34.8–46.1)
HGB BLD-MCNC: 9.9 G/DL (ref 11.5–15.4)
LYMPHOCYTES # BLD AUTO: 1.61 THOUSANDS/ΜL (ref 0.6–4.47)
LYMPHOCYTES NFR BLD AUTO: 26 % (ref 14–44)
MCH RBC QN AUTO: 28.4 PG (ref 26.8–34.3)
MCHC RBC AUTO-ENTMCNC: 33.4 G/DL (ref 31.4–37.4)
MCV RBC AUTO: 85 FL (ref 82–98)
MONOCYTES # BLD AUTO: 0.43 THOUSAND/ΜL (ref 0.17–1.22)
MONOCYTES NFR BLD AUTO: 7 % (ref 4–12)
NEUTROPHILS # BLD AUTO: 3.85 THOUSANDS/ΜL (ref 1.85–7.62)
NEUTS SEG NFR BLD AUTO: 62 % (ref 43–75)
NRBC BLD AUTO-RTO: 0 /100 WBCS
PLATELET # BLD AUTO: 170 THOUSANDS/UL (ref 149–390)
PMV BLD AUTO: 9.6 FL (ref 8.9–12.7)
POTASSIUM SERPL-SCNC: 4.2 MMOL/L (ref 3.5–5.3)
RBC # BLD AUTO: 3.49 MILLION/UL (ref 3.81–5.12)
SODIUM SERPL-SCNC: 141 MMOL/L (ref 136–145)
WBC # BLD AUTO: 6.25 THOUSAND/UL (ref 4.31–10.16)

## 2018-03-02 PROCEDURE — 99232 SBSQ HOSP IP/OBS MODERATE 35: CPT | Performed by: PHYSICIAN ASSISTANT

## 2018-03-02 PROCEDURE — 82948 REAGENT STRIP/BLOOD GLUCOSE: CPT

## 2018-03-02 PROCEDURE — 80048 BASIC METABOLIC PNL TOTAL CA: CPT | Performed by: PHYSICIAN ASSISTANT

## 2018-03-02 PROCEDURE — 99238 HOSP IP/OBS DSCHRG MGMT 30/<: CPT | Performed by: PHYSICIAN ASSISTANT

## 2018-03-02 PROCEDURE — 85025 COMPLETE CBC W/AUTO DIFF WBC: CPT | Performed by: PHYSICIAN ASSISTANT

## 2018-03-02 PROCEDURE — 99232 SBSQ HOSP IP/OBS MODERATE 35: CPT | Performed by: FAMILY MEDICINE

## 2018-03-02 RX ORDER — BISACODYL 10 MG
10 SUPPOSITORY, RECTAL RECTAL DAILY
Status: COMPLETED | OUTPATIENT
Start: 2018-03-02 | End: 2018-03-02

## 2018-03-02 RX ORDER — BISACODYL 10 MG
10 SUPPOSITORY, RECTAL RECTAL DAILY PRN
Status: DISCONTINUED | OUTPATIENT
Start: 2018-03-03 | End: 2018-03-02 | Stop reason: HOSPADM

## 2018-03-02 RX ORDER — ACETAMINOPHEN 325 MG/1
650 TABLET ORAL EVERY 6 HOURS PRN
Qty: 30 TABLET | Refills: 0 | Status: SHIPPED | OUTPATIENT
Start: 2018-03-02 | End: 2022-01-10

## 2018-03-02 RX ORDER — SODIUM PHOSPHATE, DIBASIC AND SODIUM PHOSPHATE, MONOBASIC 7; 19 G/133ML; G/133ML
1 ENEMA RECTAL ONCE
Status: COMPLETED | OUTPATIENT
Start: 2018-03-02 | End: 2018-03-02

## 2018-03-02 RX ORDER — SODIUM PHOSPHATE, DIBASIC AND SODIUM PHOSPHATE, MONOBASIC 7; 19 G/133ML; G/133ML
1 ENEMA RECTAL ONCE
Status: DISCONTINUED | OUTPATIENT
Start: 2018-03-02 | End: 2018-03-02 | Stop reason: SDUPTHER

## 2018-03-02 RX ADMIN — ACETAMINOPHEN 650 MG: 325 TABLET, FILM COATED ORAL at 13:33

## 2018-03-02 RX ADMIN — LEVOTHYROXINE SODIUM 100 MCG: 100 TABLET ORAL at 05:54

## 2018-03-02 RX ADMIN — FLUTICASONE PROPIONATE AND SALMETEROL 1 PUFF: 50; 100 POWDER RESPIRATORY (INHALATION) at 12:00

## 2018-03-02 RX ADMIN — INSULIN LISPRO 4 UNITS: 100 INJECTION, SOLUTION INTRAVENOUS; SUBCUTANEOUS at 13:34

## 2018-03-02 RX ADMIN — FLUTICASONE PROPIONATE 1 PUFF: 110 AEROSOL, METERED RESPIRATORY (INHALATION) at 12:01

## 2018-03-02 RX ADMIN — ATORVASTATIN CALCIUM 40 MG: 40 TABLET, FILM COATED ORAL at 08:53

## 2018-03-02 RX ADMIN — DOCUSATE SODIUM 100 MG: 100 CAPSULE, LIQUID FILLED ORAL at 08:54

## 2018-03-02 RX ADMIN — SODIUM PHOSPHATE 1 ENEMA: 7; 19 ENEMA RECTAL at 15:15

## 2018-03-02 RX ADMIN — VALSARTAN 160 MG: 160 TABLET ORAL at 08:54

## 2018-03-02 RX ADMIN — LIDOCAINE 1 PATCH: 50 PATCH TOPICAL at 08:54

## 2018-03-02 RX ADMIN — ACETAMINOPHEN 650 MG: 325 TABLET, FILM COATED ORAL at 05:54

## 2018-03-02 RX ADMIN — HEPARIN SODIUM 5000 UNITS: 5000 INJECTION, SOLUTION INTRAVENOUS; SUBCUTANEOUS at 05:54

## 2018-03-02 RX ADMIN — CALCIUM CARBONATE 500 MG (1,250 MG)-VITAMIN D3 200 UNIT TABLET 2 TABLET: at 08:54

## 2018-03-02 RX ADMIN — BISACODYL 10 MG: 10 SUPPOSITORY RECTAL at 12:02

## 2018-03-02 NOTE — PROGRESS NOTES
Progress note on 3/2/18 performed by myself is a trauma floor note and not an ICU/Critical Care note

## 2018-03-02 NOTE — ASSESSMENT & PLAN NOTE
- Appreciate geriatrics input  - continue delirium precautions  - f/u at [facility] upon discharge for formal cog assessment

## 2018-03-02 NOTE — ASSESSMENT & PLAN NOTE
- abdomen soft, non-tender on exam today  - Add senna and dulcolax suppository today to bowel regimen in addition to colace  - Regularly uses enemas at home - continue if needed at home  - No complaints of any nausea or vomiting

## 2018-03-02 NOTE — PROGRESS NOTES
Progress Note - Kirk Bailey 9/26/1928, 80 y o  female MRN: [id number]    Unit/Bed#: [facility] 930-01 Encounter: [id number]    Primary Care Provider: Jacki Robledo DO   Date and time admitted to hospital: 3/1/2018  3:23 AM        Fall (on) (from) other stairs and steps, initial encounter   Assessment & Plan    - PT/OT pending  - fall precautions        * Closed compression fracture of L4 lumbar vertebra [facility])   Assessment & Plan    - Neurosurgery consulted, appreciate input  - non-operative management in LSO brace per Neurosurgery  - upright xrays pending  - PT/OT pending  - Neurovascularly intact        Acute kidney injury superimposed on chronic kidney disease ([facility] [address] )   Assessment & Plan    - continue gentle IVF hydration -- d/c as patient being discharged  - Cr from 1 5 - 1 3 - 1 5  - stable when compared to baseline  - Will follow-up with PCP to discuss outpatient management         DM2 (diabetes mellitus, type 2) ([facility] [address] )   Assessment & Plan    - uncontrolled   Home metformin and glyburide on hold with occasional vomiting    - continue lantus and mealtime coverage as ordered  - increased SSI to algorithm 4 yesterday; Blood sugars controlled         COPD (chronic obstructive pulmonary disease) ([facility] [address] )   Assessment & Plan    - without acute exacerbation  - continue advair and flovent  - Saturating appropriately on room air        Constipation   Assessment & Plan    - abdomen soft, non-tender on exam today  - Add senna and dulcolax suppository today to bowel regimen in addition to colace  - No complaints of any nausea or vomiting        HTN (hypertension)   Assessment & Plan    - continue home medications        Hypothyroidism   Assessment & Plan    - on home synthroid, continue        Overactive bladder   Assessment & Plan    - d/c Oxybutinin, will not resume upon discharge due to anticholinergic side effects, per recommendation of geriatrics        Cognitive impairment   Assessment & Plan    - Appreciate geriatrics input  - continue delirium precautions  - f/u at [facility] upon discharge for formal cog assessment        Hematoma of frontal scalp   Assessment & Plan    - local wound care        Ambulatory dysfunction   Assessment & Plan    - PT/OT -- home with family support          DVT prophylaxis: SQH and SCDs  PT and OT: home with family support    Disposition:  Discuss case with neurosurgery and see if patient is appropriate for disposition and discharge per the recommendations  Patient follow-up as an outpatient with PCP to discuss chronic kidney disease  BMP is stable    Subjective/Objective   Chief Complaint: "Feeling better today "    Subjective:  Patient offers no new complaints today on presentation  Patient denies any new abdominal pain  Patient denies any episodes of vomiting  Patient reports she is tolerating a diet  Patient denies any chest pain or shortness of breath  Patient reports she is comfortable sitting in the brace      Objective:     Meds/Allergies   Prescriptions Prior to Admission   Medication Sig Dispense Refill Last Dose    aspirin (ECOTRIN LOW STRENGTH) 81 mg EC tablet Take 81 mg by mouth daily       atorvastatin (LIPITOR) 40 mg tablet Take 40 mg by mouth daily       Calcium Carb-Cholecalciferol (CALTRATE 600+D3 SOFT PO) Take 2 tablets by mouth       docusate sodium (COLACE) 100 mg capsule Take 100 mg by mouth 2 (two) times a day       fluticasone (FLOVENT HFA) 110 MCG/ACT inhaler Inhale 1 puff 2 (two) times a day       glimepiride (AMARYL) 2 mg tablet Take 2 mg by mouth every morning before breakfast       levothyroxine 100 mcg tablet Take 100 mcg by mouth daily       metFORMIN (GLUCOPHAGE) 1000 MG tablet Take 1,000 mg by mouth 2 (two) times a day with meals       oxybutynin (DITROPAN-XL) 5 mg 24 hr tablet Take 5 mg by mouth daily       Umeclidinium-Vilanterol (ANORO ELLIPTA) 62 5-25 MCG/INH AEPB Inhale       valsartan (DIOVAN) 160 mg tablet Take 160 mg by mouth daily          Vitals: Blood pressure 121/58, pulse 83, temperature 98 2 °F (36 8 °C), temperature source Oral, resp  rate 16, height 5' 1" (1 549 m), weight 67 6 kg (149 lb 0 5 oz), SpO2 97 %  Body mass index is 28 16 kg/m²  SpO2: SpO2: 97 %, SpO2 Device: O2 Device: None (Room air)    ABG: No results found for: PHART, SWN3TXV, PO2ART, YVD5LCB, H9QRIHJC, BEART, SOURCE      Intake/Output Summary (Last 24 hours) at 03/02/18 1207  Last data filed at 03/02/18 0900   Gross per 24 hour   Intake          2158 75 ml   Output              600 ml   Net          1558 75 ml       Invasive Devices     Peripheral Intravenous Line            Peripheral IV 02/28/18 Right Antecubital 1 day                Nutrition/GI Proph/Bowel Reg:  Continue current regimen    Physical Exam:   GENERAL APPEARANCE:  No acute distress, well-appearing  HEENT:  Scalp hematoma over the forehead, PERRLA  CV:  Regular rate and rhythm, no split S1-S2  LUNGS:  CTA bilaterally, no rales or rhonchi  ABD:  Bowel sounds x4, nontender, nondistended  EXT:  +2 pulses on extremities, neurovascularly intact  NEURO:  Cranial nerves 2-12 grossly intact, no focal deficits, GCS is 15  SKIN:  Warm, dry, intact    Lab Results:   Results: I have personally reviewed pertinent reports   , BMP/CMP:   Lab Results   Component Value Date     03/02/2018    K 4 2 03/02/2018     03/02/2018    CO2 28 03/02/2018    ANIONGAP 7 03/02/2018    BUN 28 (H) 03/02/2018    CREATININE 1 59 (H) 03/02/2018    GLUCOSE 128 03/02/2018    CALCIUM 8 5 03/02/2018    EGFR 33 03/02/2018    and CBC:   Lab Results   Component Value Date    WBC 6 25 03/02/2018    HGB 9 9 (L) 03/02/2018    HCT 29 6 (L) 03/02/2018    MCV 85 03/02/2018     03/02/2018    MCH 28 4 03/02/2018    MCHC 33 4 03/02/2018    RDW 13 9 03/02/2018    MPV 9 6 03/02/2018    NRBC 0 03/02/2018     Imaging/EKG Studies: Results: I have personally reviewed pertinent reports      Other Studies:  No Other studies  VTE Prophylaxis:  Subcutaneous heparin and SCDs    Patient care rounds were completed with the patient's nurse today, Rosibel Navarro  We discussed the plan is to discharge patient today  We reviewed all of the invasive devices/lines/telemetry orders  Pain Assessment / Plan:  Continue current plan  Mobility Assessment / Plan:  Continue current plan  Goals / Barriers for discharge:  Discharge today  Case management following  All questions and concerns were addressed  I spent greater than 29 minutes reviewing the plan with the patient and the nurse, and coordinating care for the day      Noemi Mcintyre PA-C  3/2/2018 12:11 PM

## 2018-03-02 NOTE — DISCHARGE SUMMARY
Discharge Summary - Tristan Dakin 80 y o  female MRN: [id number]    Unit/Bed#: [facility] 930-01 Encounter: [id number]    Admission Date: 3/1/2018     Admitting Diagnosis: Knee injury [S89 90XA]  Closed fracture of fourth lumbar vertebra, unspecified fracture morphology, initial encounter [facility] Melene Flow    HPI: Tristan Dakin is a 80 y o  female who presents s/p fall  Patient had a mechanical fall on 2/28 while going up a flight of stairs and fell backwards down about 11 steps  Denies LOC and was helped up and ambulatory at the scene  Takes ASA daily  Evaluation in ED at [facility] revealed L4 fracture and patient transferred to [facility] for further management  C/o lower back and upper sternum pain on evaluation      Mechanism:Fall    Procedures Performed: No orders of the defined types were placed in this encounter  Hospital Course: Patient evaluated with CT head, c-spine, t-spine, which were all negative  CT C/A/P demonstrated L4 compression fracture  Neurosurgery evaluated and determined injury to be non-surgical  She was fitted for TLSO brace to be worn in bed > 45 degrees and out of bed  Pain was well controlled  PT recommended patient be discharged to home with family support and home PT  Significant Findings, Care, Treatment and Services Provided:   Ct Chest Abdomen Pelvis Wo Contrast    Result Date: 3/1/2018  Impression: 1  Bibasilar atelectasis or scarring  Scarring or atelectasis in the superior segment of the right lower lobe  2   Moderate compression fracture of the superior endplate of L4 with a suspected Schmorl's node  This may represent chronic or acute on chronic fracture  3   Thickening of the walls of the distal esophagus  Possibly could be secondary to a hiatal hernia  If clinically appropriate, consider endoscopy or barium swallow   Workstation performed: [id number]     Xr Spine Lumbar 2 Or 3 Views Injury    Result Date: 3/2/2018  Impression: L4 compression fracture with approximately 30% height loss  No evidence of listhesis  Workstation performed: [id number]     Ct Head Without Contrast    Result Date: 3/1/2018  Impression: No acute intracranial abnormality  Microangiopathic change  Atrophy  Scalp hematoma  Findings are consistent with the preliminary report from [facility] which was provided shortly after completion of the exam  Workstation performed: [id number]     Ct Facial Bones Without Contrast    Result Date: 3/1/2018  Impression: 1  No acute fracture  2    Old left nasal bone fracture  3   Scalp hematoma  Workstation performed: [id number]     Ct Cervical Spine Without Contrast    Result Date: 3/1/2018  Impression: No cervical spine fracture or traumatic malalignment  Degenerative changes  Workstation performed: [id number]     Ct Recon Only Thoracolumbar    Result Date: 3/1/2018  Impression:  IMPRESSION: No fracture or traumatic subluxation  Degenerative changes  Workstation performed: [id number]       Complications: no complications    Discharge Diagnosis:   Patient Active Problem List   Diagnosis    Closed compression fracture of L4 lumbar vertebra (HCC)    Hematoma of frontal scalp    Hyperlipidemia    DM2 (diabetes mellitus, type 2) ([facility] [address] )    HTN (hypertension)    COPD (chronic obstructive pulmonary disease) (HCC)    Overactive bladder    Hypothyroidism    Fall (on) (from) other stairs and steps, initial encounter    Cognitive impairment    Ambulatory dysfunction    Physical deconditioning    Constipation    Acute kidney injury superimposed on chronic kidney disease ([facility] [address] )         Resolved Problems  Date Reviewed: 3/2/2018          Resolved    CKD (chronic kidney disease) 3/1/2018     Resolved by  Compa Polk PA-C          Condition at Discharge: good     Discharge instructions/Information to patient and family:   See after visit summary for information provided to patient and family        Provisions for Follow-Up Care:  See after visit summary for information related to follow-up care and any pertinent home health orders  Disposition: Home    Planned Readmission: No    Discharge Statement   I spent 29 minutes discharging the patient  This time was spent on the day of discharge  I had direct contact with the patient on the day of discharge  Additional documentation is required if more than 30 minutes were spent on discharge  Discharge Medications:  See after visit summary for reconciled discharge medications provided to patient and family

## 2018-03-02 NOTE — PLAN OF CARE
DISCHARGE PLANNING     Discharge to home or other facility with appropriate resources Progressing        INFECTION - ADULT     Absence or prevention of progression during hospitalization Progressing        Knowledge Deficit     Patient/family/caregiver demonstrates understanding of disease process, treatment plan, medications, and discharge instructions Progressing        Nutrition/Hydration-ADULT     Nutrient/Hydration intake appropriate for improving, restoring or maintaining nutritional needs Progressing        PAIN - ADULT     Verbalizes/displays adequate comfort level or baseline comfort level Progressing        Potential for Falls     Patient will remain free of falls Progressing        SAFETY ADULT     Maintain or return to baseline ADL function Progressing     Maintain or return mobility status to optimal level Progressing

## 2018-03-02 NOTE — ASSESSMENT & PLAN NOTE
- continue gentle IVF hydration -- d/c as patient being discharged  - Cr from 1 5 - 1 3 - 1 5  - stable when compared to baseline  - Will follow-up with PCP to discuss outpatient management

## 2018-03-02 NOTE — ASSESSMENT & PLAN NOTE
- Appreciate geriatrics input  - continue delirium precautions  - f/u at [facility] upon discharge for formal cog assessment

## 2018-03-02 NOTE — PROGRESS NOTES
Progress Note - Princess Cardenas 80 y o  female MRN: [id number]    [facility] Encounter: [id number]      Assessment/Plan:  80year old female with:    1  Ambulatory dysfunction and fall- PT/OT, fall precautions; continue calcium and Vitamin D supplementation  See geriatric pain order set, agree with current regimen    2  Cognitive impairment- per history, with impairment per MMSE score; likely MCI; at risk for further cognitive decline in this setting  Recommend comprehensive geriatric assessment outpatient  Continued supportive care and management of acute and chronic medical conditions per primary team  Delirium precautions as previously outlined     3  Deconditioning- supportive care, nutritional support; per PT/OT notes, plan for home with family support and home PT at discharge      4  Constipation- will administer suppository today; continue docusate and senna    5  Incontinence/OAB- recommend scheduled toileting Q2-3h while awake; oxybutynin discontinued due to anticholinergic and cognitive side effects    6  Polypharmacy- medications reviewed with recommendations as outlined  D/c PRN compazine as patient has not required and due to potential anticholinergic side effects      Subjective:   Patient seen and examined for geriatric follow up  Has not used PRN OxyIR in past 24h  Required zofran x1 yesterday for nausea/vomiting; no bowel movement recorded since admission; normally uses a suppository at home  Good appetite and PO intake with breakfast  Pain controlled well overall  Asking when she will be discharged; tells me her  is currently in rehab  Remaining ROS negative  Objective:     Vitals: Blood pressure 121/58, pulse 83, temperature 98 2 °F (36 8 °C), temperature source Oral, resp  rate 16, height 5' 1" (1 549 m), weight 67 6 kg (149 lb 0 5 oz), SpO2 97 %  ,Body mass index is 28 16 kg/m²        Intake/Output Summary (Last 24 hours) at 03/02/18 1056  Last data filed at 03/02/18 0900   Gross per 24 hour   Intake          2158 75 ml   Output              600 ml   Net          1558 75 ml       Physical Exam:   Awake and alert, seated upright in bed  NAD  Pleasant, cooperative  MMM, oropharynx clear  RRR +S1 +S2  CTA b/l auscultated superiorly; lumbar brace in place  Abdomen soft nontender +distended +bowel sounds  Oriented to person, place, situation  Answers questions appropriately and follows commands     Invasive Devices     Peripheral Intravenous Line            Peripheral IV 02/28/18 Right Antecubital 1 day                Lab, Imaging and other studies: I have personally reviewed pertinent reports  Medications and labs reviewed

## 2018-03-02 NOTE — PHYSICIAN ADVISOR
Current patient class: Inpatient  The patient is currently on Hospital Day: 1      The patient was admitted to the hospital at [facility] on 3/1/18 for the following diagnosis:  Knee injury [S89 90XA]  Closed fracture of fourth lumbar vertebra, unspecified fracture morphology, initial encounter ([facility] [address] ) [S32 049A]       There is documentation in the medical record of an expected length of stay of at least 2 midnights  The patient is therefore expected to satisfy the 2 midnight benchmark and given the 2 midnight presumption is appropriate for INPATIENT ADMISSION  Given this expectation of a satisfying stay, CMS instructs us that the patient is most often appropriate for inpatient admission under part A provided medical necessity is documented in the chart  After review of the relevant documentation, labs, vital signs and test results, the patient is appropriate for INPATIENT ADMISSION  Admission to the hospital as an inpatient is a complex decision making process which requires the practitioner to consider the patients presenting complaint, history and physical examination and all relevant testing  With this in mind, in this case, the patient was deemed appropriate for INPATIENT ADMISSION  After review of the documentation and testing available at the time of the admission I concur with this clinical determination of medical necessity  Rationale is as follows: The patient is a 80 yrs old Female who presented to the ED at 3/1/2018  3:23 AM with a chief complaint of Fall (Pt was walking up stairs and fell about 11 steps   Trauma tx from [facility])    The patients vitals on arrival were ED Triage Vitals   Temperature Pulse Respirations Blood Pressure SpO2   03/01/18 0330 03/01/18 0330 03/01/18 0330 03/01/18 0330 03/01/18 0330   98 °F (36 7 °C) 95 18 137/61 100 %      Temp Source Heart Rate Source Patient Position - Orthostatic VS BP Location FiO2 (%)   03/01/18 0330 03/01/18 0330 03/01/18 0345 03/01/18 4213 --   Oral Monitor Lying Left arm       Pain Score       03/01/18 0328       5           Past Medical History:   Diagnosis Date    COPD [facility]chronic obstructive pulmonary disease) ([facility] [address] [facility]     Diabetes mellitus ([facility] [address] )     Hyperlipidemia     Hypertension     Hypothyroidism     Overactive bladder      Past Surgical History:   Procedure Laterality Date    CAROTID ENDARTERECTOMY Right     NEPHRECTOMY      in her 25s           Consults have been placed to:   IP CONSULT TO CASE MANAGEMENT  IP CONSULT TO NEUROSURGERY  IP CONSULT TO GERONTOLOGY    Vitals:    03/01/18 0432 03/01/18 0717 03/01/18 1543 03/01/18 1551   BP: 160/79 103/54 102/53    BP Location: Left arm Left arm Left arm    Pulse: 98 82 88    Resp: 18 16 16    Temp: 98 °F (36 7 °C) 97 7 °F (36 5 °C) 97 5 °F (36 4 °C)    TempSrc: Oral Oral Oral    SpO2: 97% 93% 95% 98%   Weight: 67 6 kg (149 lb 0 5 oz)      Height: 5' 1" (1 549 m)          Most recent labs:    Recent Labs      02/28/18   2310  03/01/18   0519   WBC  9 90   --    HGB  11 7   --    HCT  35 2   --    PLT  200  200   K  4 0  4 0   NA  139  142   CALCIUM  9 6  9 3   BUN  24  24   CREATININE  1 44*  1 33*   AST  23   --    ALT  26   --    ALKPHOS  71   --    BILITOT  0 30   --        Scheduled Meds:  Current Facility-Administered Medications:  acetaminophen 650 mg Oral Q8H [address] Yenny Infante PA-C    atorvastatin 40 mg Oral Daily Malathi Roman MD    bisacodyl 10 mg Rectal Daily PRN Irving Siddiqi PA-C    calcium carbonate-vitamin D 2 tablet Oral Daily With Breakfast Malathi Roman MD    docusate sodium 100 mg Oral BID Malathi Roman MD    fluticasone 1 puff Inhalation BID Malathi Roman MD    fluticasone-salmeterol 1 puff Inhalation Q12H Carlos U  79 , MD    heparin (porcine) 5,000 Units Subcutaneous Q8H Carlos Adame MD    insulin glargine 10 Units Subcutaneous HS Rohan Taylor MD    insulin lispro 1-6 Units Subcutaneous HS Dayna Leyva PA-C    insulin lispro 2-12 Units Subcutaneous TID AC Luba Bautista PA-C    levothyroxine 100 mcg Oral Early Morning Kika Flatness, MD    lidocaine 1 patch Transdermal Daily Yenny Infante PA-C    ondansetron 4 mg Intravenous Q6H PRN Kika Flatness, MD    oxyCODONE 2 5 mg Oral Q4H PRN Kika Flatness, MD    oxyCODONE 5 mg Oral Q4H PRN Kika Flatness, MD    prochlorperazine 25 mg Rectal Q12H PRN SELINA Merrill    senna 2 tablet Oral HS Esequieljacque Nelson PA-C    sodium chloride 75 mL/hr Intravenous Continuous Kika Flatchris, MD Last Rate: Stopped (03/01/18 1410)   valsartan 160 mg Oral Daily Kika Flatchris, MD      Continuous Infusions:  sodium chloride 75 mL/hr Last Rate: Stopped (03/01/18 1410)     PRN Meds: bisacodyl    ondansetron    oxyCODONE    oxyCODONE    prochlorperazine    Surgical procedures (if appropriate):

## 2018-03-02 NOTE — ASSESSMENT & PLAN NOTE
- abdomen soft, non-tender on exam today  - Add senna and dulcolax suppository today to bowel regimen in addition to colace  - No complaints of any nausea or vomiting

## 2018-03-02 NOTE — ASSESSMENT & PLAN NOTE
- Neurosurgery consulted, appreciate input  - non-operative management in LSO brace per Neurosurgery  - upright xrays pending  - PT/OT pending  - Neurovascularly intact

## 2018-03-02 NOTE — ASSESSMENT & PLAN NOTE
- uncontrolled   Home metformin and glyburide on hold with occasional vomiting    - continue lantus and mealtime coverage as ordered  - increased SSI to algorithm 4 yesterday; Blood sugars controlled

## 2018-03-02 NOTE — DISCHARGE INSTRUCTIONS
LSO Brace Discharge Instructions  LSO brace to be worn when out of bed of head of bed greater than 45 degrees  May be removed for showering  No heavy lifting  No strenuous activities  No Driving  **Please notify MD immediately if you have increased back or leg pain  New numbness, tingling and/or weakness in your legs  **    Follow-up with primary care provider to discuss hospital stay as well as other medical comorbidities

## 2018-03-02 NOTE — PROGRESS NOTES
Upright lumbar xrays reviewed personally  Reveal stable alignment and appearance of L4 fracture - patient clear for discharge from a neurosurgical standpoint  Follow-up in 2 weeks with repeat lumbar xrays

## 2018-03-12 ENCOUNTER — TELEPHONE (OUTPATIENT)
Dept: NEUROSURGERY | Facility: CLINIC | Age: 83
End: 2018-03-12

## 2018-03-12 NOTE — TELEPHONE ENCOUNTER
Spoke to patient and patient would like to cancel her appt with [facility]ange on 3/13/18  Patient is following up with her PCP in regards to her L4 compression fx

## 2018-03-14 ENCOUNTER — APPOINTMENT (EMERGENCY)
Dept: CT IMAGING | Facility: HOSPITAL | Age: 83
End: 2018-03-14
Payer: MEDICARE

## 2018-03-14 ENCOUNTER — HOSPITAL ENCOUNTER (EMERGENCY)
Facility: HOSPITAL | Age: 83
Discharge: HOME | End: 2018-03-14
Attending: EMERGENCY MEDICINE | Admitting: EMERGENCY MEDICINE
Payer: MEDICARE

## 2018-03-14 VITALS
RESPIRATION RATE: 18 BRPM | HEIGHT: 61 IN | WEIGHT: 157.2 LBS | DIASTOLIC BLOOD PRESSURE: 79 MMHG | HEART RATE: 90 BPM | OXYGEN SATURATION: 99 % | TEMPERATURE: 98.9 F | SYSTOLIC BLOOD PRESSURE: 172 MMHG | BODY MASS INDEX: 29.68 KG/M2

## 2018-03-14 DIAGNOSIS — M89.9 LESION OF LUMBAR SPINE: ICD-10-CM

## 2018-03-14 DIAGNOSIS — K59.00 CONSTIPATION: Primary | ICD-10-CM

## 2018-03-14 LAB
ALBUMIN SERPL BCP-MCNC: 3 G/DL (ref 3.5–5)
ALP SERPL-CCNC: 104 U/L (ref 46–116)
ALT SERPL W P-5'-P-CCNC: 22 U/L (ref 12–78)
ANION GAP SERPL CALCULATED.3IONS-SCNC: 7 MMOL/L (ref 4–13)
AST SERPL W P-5'-P-CCNC: 22 U/L (ref 5–45)
BASOPHILS # BLD AUTO: 0.04 THOUSANDS/ΜL (ref 0–0.1)
BASOPHILS NFR BLD AUTO: 1 % (ref 0–1)
BILIRUB SERPL-MCNC: 0.3 MG/DL (ref 0.2–1)
BUN SERPL-MCNC: 29 MG/DL (ref 5–25)
CALCIUM SERPL-MCNC: 9.2 MG/DL (ref 8.3–10.1)
CHLORIDE SERPL-SCNC: 104 MMOL/L (ref 100–108)
CO2 SERPL-SCNC: 29 MMOL/L (ref 21–32)
CREAT SERPL-MCNC: 1.67 MG/DL (ref 0.6–1.3)
EOSINOPHIL # BLD AUTO: 0.41 THOUSAND/ΜL (ref 0–0.61)
EOSINOPHIL NFR BLD AUTO: 6 % (ref 0–6)
ERYTHROCYTE [DISTWIDTH] IN BLOOD BY AUTOMATED COUNT: 14.1 % (ref 11.6–15.1)
GFR SERPL CREATININE-BSD FRML MDRD: 31 ML/MIN/1.73SQ M
GLUCOSE SERPL-MCNC: 155 MG/DL (ref 65–140)
HCT VFR BLD AUTO: 33.4 % (ref 34.8–46.1)
HGB BLD-MCNC: 11.1 G/DL (ref 11.5–15.4)
LIPASE SERPL-CCNC: 388 U/L (ref 73–393)
LYMPHOCYTES # BLD AUTO: 2 THOUSANDS/ΜL (ref 0.6–4.47)
LYMPHOCYTES NFR BLD AUTO: 28 % (ref 14–44)
MCH RBC QN AUTO: 28.3 PG (ref 26.8–34.3)
MCHC RBC AUTO-ENTMCNC: 33.2 G/DL (ref 31.4–37.4)
MCV RBC AUTO: 85 FL (ref 82–98)
MONOCYTES # BLD AUTO: 0.57 THOUSAND/ΜL (ref 0.17–1.22)
MONOCYTES NFR BLD AUTO: 8 % (ref 4–12)
NEUTROPHILS # BLD AUTO: 4.07 THOUSANDS/ΜL (ref 1.85–7.62)
NEUTS SEG NFR BLD AUTO: 57 % (ref 43–75)
PLATELET # BLD AUTO: 250 THOUSANDS/UL (ref 149–390)
PMV BLD AUTO: 9.7 FL (ref 8.9–12.7)
POTASSIUM SERPL-SCNC: 4 MMOL/L (ref 3.5–5.3)
PROT SERPL-MCNC: 7.3 G/DL (ref 6.4–8.2)
RBC # BLD AUTO: 3.92 MILLION/UL (ref 3.81–5.12)
SODIUM SERPL-SCNC: 140 MMOL/L (ref 136–145)
WBC # BLD AUTO: 7.09 THOUSAND/UL (ref 4.31–10.16)

## 2018-03-14 PROCEDURE — 80053 COMPREHEN METABOLIC PANEL: CPT | Performed by: EMERGENCY MEDICINE

## 2018-03-14 PROCEDURE — 36415 COLL VENOUS BLD VENIPUNCTURE: CPT | Performed by: EMERGENCY MEDICINE

## 2018-03-14 PROCEDURE — 99284 EMERGENCY DEPT VISIT MOD MDM: CPT

## 2018-03-14 PROCEDURE — 74176 CT ABD & PELVIS W/O CONTRAST: CPT

## 2018-03-14 PROCEDURE — 83690 ASSAY OF LIPASE: CPT | Performed by: EMERGENCY MEDICINE

## 2018-03-14 PROCEDURE — 85025 COMPLETE CBC W/AUTO DIFF WBC: CPT | Performed by: EMERGENCY MEDICINE

## 2018-03-14 RX ORDER — MAGNESIUM CARB/ALUMINUM HYDROX 105-160MG
296 TABLET,CHEWABLE ORAL ONCE
Status: COMPLETED | OUTPATIENT
Start: 2018-03-14 | End: 2018-03-14

## 2018-03-14 RX ADMIN — IOHEXOL 50 ML: 240 INJECTION, SOLUTION INTRATHECAL; INTRAVASCULAR; INTRAVENOUS; ORAL at 17:20

## 2018-03-14 RX ADMIN — MAGESIUM CITRATE 296 ML: 1.75 LIQUID ORAL at 19:14

## 2018-03-14 NOTE — ED PROVIDER NOTES
History  Chief Complaint   Patient presents with    Constipation     Pt presents to ED due to c/o constpiation  LBM 3/6/18  History provided by:  Patient and relative   used: No     80year-old female presented for evaluation of constipation over the last 4-5 days  Not 8 days like triage note  Patient has a history of constipation and states that usually using OTC meds in combination with an enema helps  She has been using a laxative, stool softener, MiraLax and enemas over the last 3 days without any improvement  She feels a little bloated and has some right lower quadrant pain but denies any nausea, vomiting, fever, chills  History of hysterectomy, nephrectomy many years ago  Denies any history of bowel obstructions  Has had multiple colonoscopies in the past without any significant findings  Patient notes that she was recently in the hospital after a fall but denies any new pain medications  She had been straining with some constipation in the hospital and had some blood in her stool but that had cleared  On exam here she has normal vitals and appears well overall  She has some mild right lower quadrant tenderness  No rebound or guarding  Oropharynx moist  Differential diagnosis includes simple constipation, bowel obstruction, appendicitis  Will check labs, CT and re-evaluate for treatment  Prior to Admission Medications   Prescriptions Last Dose Informant Patient Reported? Taking?    Calcium Carb-Cholecalciferol (CALTRATE 600+D3 SOFT PO)   Yes No   Sig: Take 2 tablets by mouth   Umeclidinium-Vilanterol (ANORO ELLIPTA) 62 5-25 MCG/INH AEPB   Yes No   Sig: Inhale   acetaminophen (TYLENOL) 325 mg tablet   No No   Sig: Take 2 tablets (650 mg total) by mouth every 6 (six) hours as needed for mild pain   aspirin (ECOTRIN LOW STRENGTH) 81 mg EC tablet   Yes No   Sig: Take 81 mg by mouth daily   atorvastatin (LIPITOR) 40 mg tablet   Yes No   Sig: Take 40 mg by mouth daily docusate sodium (COLACE) 100 mg capsule   Yes No   Sig: Take 100 mg by mouth 2 (two) times a day   fluticasone (FLOVENT HFA) 110 MCG/ACT inhaler   Yes No   Sig: Inhale 1 puff 2 (two) times a day   glimepiride (AMARYL) 2 mg tablet   Yes No   Sig: Take 2 mg by mouth every morning before breakfast   levothyroxine 100 mcg tablet   Yes No   Sig: Take 100 mcg by mouth daily   metFORMIN (GLUCOPHAGE) 1000 MG tablet   Yes Yes   Sig: Take 1,500 mg by mouth 2 (two) times a day with meals   valsartan (DIOVAN) 160 mg tablet   Yes No   Sig: Take 160 mg by mouth daily      Facility-Administered Medications: None       Past Medical History:   Diagnosis Date    COPD [facility]chronic obstructive pulmonary disease) ([facility] )     Diabetes mellitus ([facility] )     Hyperlipidemia     Hypertension     Hypothyroidism     Overactive bladder        Past Surgical History:   Procedure Laterality Date    CAROTID ENDARTERECTOMY Right     NEPHRECTOMY      in her 25s       Family History   Problem Relation Age of Onset    Family history unknown: Yes     I have reviewed and agree with the history as documented  Social History   Substance Use Topics    Smoking status: Former Smoker    Smokeless tobacco: Never Used    Alcohol use No        Review of Systems   Constitutional: Negative for activity change, appetite change, chills and fever  Respiratory: Negative for chest tightness and shortness of breath  Cardiovascular: Negative for chest pain and palpitations  Gastrointestinal: Positive for abdominal pain and constipation  Negative for nausea and vomiting  Musculoskeletal: Negative for back pain and neck pain  Neurological: Negative for dizziness and weakness  All other systems reviewed and are negative        Physical Exam  ED Triage Vitals [03/14/18 1551]   Temperature Pulse Respirations Blood Pressure SpO2   98 6 °F (37 °C) 98 18 142/67 99 %      Temp Source Heart Rate Source Patient Position - Orthostatic VS BP Location FiO2 (%) Oral Monitor Sitting Left arm --      Pain Score       No Pain           Orthostatic Vital Signs  Vitals:    03/14/18 1551 03/14/18 1949   BP: 142/67 (!) 172/79   Pulse: 98 90   Patient Position - Orthostatic VS: Sitting Lying       Physical Exam   Constitutional: She is oriented to person, place, and time  She appears well-developed and well-nourished  No distress  HENT:   Head: Normocephalic  Mouth/Throat: Oropharynx is clear and moist    Neck: Normal range of motion  Neck supple  Cardiovascular: Normal rate and regular rhythm  Pulmonary/Chest: Effort normal and breath sounds normal    Abdominal: Soft  She exhibits no distension  Mild right lower quadrant tenderness  No rebound or guarding  Musculoskeletal: Normal range of motion  She exhibits no edema  Neurological: She is alert and oriented to person, place, and time  Skin: Skin is warm and dry  Psychiatric: She has a normal mood and affect  Her behavior is normal    Nursing note and vitals reviewed        ED Medications  Medications   iohexol (OMNIPAQUE) 240 MG/ML solution 50 mL (50 mL Oral Given 3/14/18 1720)   magnesium citrate (CITROMA) oral solution 296 mL (296 mL Oral Given 3/14/18 1914)       Diagnostic Studies  Results Reviewed     Procedure Component Value Units Date/Time    Lipase [[id number]]  (Normal) Collected:  03/14/18 1726    Lab Status:  Final result Specimen:  Blood from Arm, Left Updated:  03/14/18 1749     Lipase 388 u/L     Comprehensive metabolic panel [[id number]]  (Abnormal) Collected:  03/14/18 1726    Lab Status:  Final result Specimen:  Blood from Arm, Left Updated:  03/14/18 1749     Sodium 140 mmol/L      Potassium 4 0 mmol/L      Chloride 104 mmol/L      CO2 29 mmol/L      Anion Gap 7 mmol/L      BUN 29 (H) mg/dL      Creatinine 1 67 (H) mg/dL      Glucose 155 (H) mg/dL      Calcium 9 2 mg/dL      AST 22 U/L      ALT 22 U/L      Alkaline Phosphatase 104 U/L      Total Protein 7 3 g/dL      Albumin 3 0 (L) g/dL Total Bilirubin 0 30 mg/dL      eGFR 31 ml/min/1 73sq m     Narrative:         National Kidney Disease Education Program recommendations are as follows:  GFR calculation is accurate only with a steady state creatinine  Chronic Kidney disease less than 60 ml/min/1 73 sq  meters  Kidney failure less than 15 ml/min/1 73 sq  meters  CBC and differential [[id number]]  (Abnormal) Collected:  03/14/18 1726    Lab Status:  Final result Specimen:  Blood from Arm, Left Updated:  03/14/18 1737     WBC 7 09 Thousand/uL      RBC 3 92 Million/uL      Hemoglobin 11 1 (L) g/dL      Hematocrit 33 4 (L) %      MCV 85 fL      MCH 28 3 pg      MCHC 33 2 g/dL      RDW 14 1 %      MPV 9 7 fL      Platelets 082 Thousands/uL      Neutrophils Relative 57 %      Lymphocytes Relative 28 %      Monocytes Relative 8 %      Eosinophils Relative 6 %      Basophils Relative 1 %      Neutrophils Absolute 4 07 Thousands/µL      Lymphocytes Absolute 2 00 Thousands/µL      Monocytes Absolute 0 57 Thousand/µL      Eosinophils Absolute 0 41 Thousand/µL      Basophils Absolute 0 04 Thousands/µL                  CT abdomen pelvis wo contrast   Final Result by Anthony Meza MD (03/14 9479)      Moderate but stable stool burden throughout the colon  No evidence for inflammatory process  Stable appearance the L4 vertebral body, likely reflecting insufficiency compression fracture  Could consider nonemergent MRI for further characterization as warranted  Workstation performed: [id number]                    Procedures  Procedures       Phone Contacts  ED Phone Contact    ED Course  ED Course as of Mar 15 1237   Wed Mar 14, 2018   2027 Patient passed large bowel movement after enema, magnesium citrate and feels better  Plan discharge  To continue bowel regimen at home                                  MDM  Number of Diagnoses or Management Options  Constipation:   Lesion of lumbar spine:   Diagnosis management comments: 79 y/o female with hx of constipation presented uncomfortable unable to pass stool x 4-5 days  No significant pain  No improvement with multiple OTC meds which is unusual for her  CT notable for constipation as well as possible lytic lesion associated with chronic L4 compression fraction  Patient given mag citrate and soap suds enema in ED with production of stool and improvement in symptoms  On labs, creatinine mildly elevated from baseline  Advised to continue daily bowel regimen, increased fluid intake, f/u with PCP  Discussed possible lytic lesion  Plans PCP f/u for additional imaging as needed  Amount and/or Complexity of Data Reviewed  Clinical lab tests: ordered and reviewed  Tests in the radiology section of CPT®: ordered and reviewed  Obtain history from someone other than the patient: yes    Patient Progress  Patient progress: improved    CritCare Time    Disposition  Final diagnoses:   Constipation   Lesion of lumbar spine     Time reflects when diagnosis was documented in both MDM as applicable and the Disposition within this note     Time User Action Codes Description Comment    3/14/2018  7:07 PM Butch BENNETT Add [K59 00] Constipation     3/14/2018  7:07 PM Maritza Mccarthy Add [M89 9] Lesion of lumbar spine       ED Disposition     ED Disposition Condition Comment    Discharge  Ever Matt discharge to home/self care      Condition at discharge: Good        Follow-up Information     Follow up With Specialties Details Why Contact Info Additional [address], [place]    [facility]  [address] [facility]  [phone]       [facility] Emergency Department Emergency Medicine  If symptoms worsen [address] [facility]  [facility] ED, [address], [place], [place], [address]        Discharge Medication List as of 3/14/2018  8:29 PM      CONTINUE these medications which have NOT CHANGED    Details   aspirin (ECOTRIN LOW STRENGTH) 81 mg EC tablet Take 81 mg by mouth daily, Historical Med      atorvastatin (LIPITOR) 40 mg tablet Take 40 mg by mouth daily, Historical Med      Calcium Carb-Cholecalciferol (CALTRATE 600+D3 SOFT PO) Take 2 tablets by mouth, Historical Med      docusate sodium (COLACE) 100 mg capsule Take 100 mg by mouth 2 (two) times a day, Historical Med      fluticasone (FLOVENT HFA) 110 MCG/ACT inhaler Inhale 1 puff 2 (two) times a day, Historical Med      glimepiride (AMARYL) 2 mg tablet Take 2 mg by mouth every morning before breakfast, Historical Med      levothyroxine 100 mcg tablet Take 100 mcg by mouth daily, Historical Med      Umeclidinium-Vilanterol (ANORO ELLIPTA) 62 5-25 MCG/INH AEPB Inhale, Historical Med      valsartan (DIOVAN) 160 mg tablet Take 160 mg by mouth daily, Historical Med      metFORMIN (GLUCOPHAGE) 1000 MG tablet Take 1,000 mg by mouth 2 (two) times a day with meals, Historical Med      oxybutynin (DITROPAN-XL) 5 mg 24 hr tablet Take 5 mg by mouth daily, Historical Med      acetaminophen (TYLENOL) 325 mg tablet Take 2 tablets (650 mg total) by mouth every 6 (six) hours as needed for mild pain, Starting Fri 3/2/2018, Print           No discharge procedures on file      ED Provider  Electronically Signed by           Samuel Bhatti MD  03/15/18 7567

## 2018-03-15 NOTE — DISCHARGE INSTRUCTIONS
Constipation   WHAT YOU NEED TO KNOW:   Constipation is when you have hard, dry bowel movements, or you go longer than usual between bowel movements  DISCHARGE INSTRUCTIONS:   Return to the emergency department if:   · You have blood in your bowel movements  · You have a fever and abdominal pain with the constipation  Contact your healthcare provider if:   · Your constipation gets worse  · You start to vomit  · You have questions or concerns about your condition or care  Medicines:   · Medicine or a fiber supplement  may help make your bowel movement softer  A laxative may help relax and loosen your intestines to help you have a bowel movement  You may also be given medicine to increase fluid in your intestines  The fluid may help move bowel movements through your intestines  · Take your medicine as directed  Contact your healthcare provider if you think your medicine is not helping or if you have side effects  Tell him of her if you are allergic to any medicine  Keep a list of the medicines, vitamins, and herbs you take  Include the amounts, and when and why you take them  Bring the list or the pill bottles to follow-up visits  Carry your medicine list with you in case of an emergency  Manage your constipation:   · Drink liquids as directed  You may need to drink extra liquids to help soften and move your bowels  Ask how much liquid to drink each day and which liquids are best for you  · Eat high-fiber foods  This may help decrease constipation by adding bulk to your bowel movements  High-fiber foods include fruit, vegetables, whole-grain breads and cereals, and beans  Your healthcare provider or dietitian can help you create a high-fiber meal plan  · Exercise regularly  Regular physical activity can help stimulate your intestines  Ask which exercises are best for you  · Schedule a time each day to have a bowel movement    This may help train your body to have regular bowel movements  Bend forward while you are on the toilet to help move the bowel movement out  Sit on the toilet for at least 10 minutes, even if you do not have a bowel movement  Follow up with your healthcare provider as directed:  Write down your questions so you remember to ask them during your visits  © 2017 [employer] Information is for End User's use only and may not be sold, redistributed or otherwise used for commercial purposes  All illustrations and images included in CareNotes® are the copyrighted property of [employer]  or [employer]  The above information is an  only  It is not intended as medical advice for individual conditions or treatments  Talk to your doctor, nurse or pharmacist before following any medical regimen to see if it is safe and effective for you

## 2018-04-04 ENCOUNTER — TRANSCRIBE ORDERS (OUTPATIENT)
Dept: ADMINISTRATIVE | Facility: HOSPITAL | Age: 83
End: 2018-04-04

## 2018-04-04 DIAGNOSIS — T14.8XXA FRACTURE: Primary | ICD-10-CM

## 2018-04-17 ENCOUNTER — HOSPITAL ENCOUNTER (OUTPATIENT)
Dept: MRI IMAGING | Facility: HOSPITAL | Age: 83
Discharge: HOME | End: 2018-04-17
Payer: MEDICARE

## 2018-04-17 DIAGNOSIS — T14.8XXA FRACTURE: ICD-10-CM

## 2018-04-17 PROCEDURE — 72148 MRI LUMBAR SPINE W/O DYE: CPT

## 2018-06-25 ENCOUNTER — OFFICE VISIT (OUTPATIENT)
Dept: OBGYN CLINIC | Facility: HOSPITAL | Age: 83
End: 2018-06-25
Payer: MEDICARE

## 2018-06-25 VITALS
SYSTOLIC BLOOD PRESSURE: 151 MMHG | WEIGHT: 158 LBS | BODY MASS INDEX: 29.83 KG/M2 | HEART RATE: 92 BPM | DIASTOLIC BLOOD PRESSURE: 80 MMHG | HEIGHT: 61 IN

## 2018-06-25 DIAGNOSIS — M48.061 SPINAL STENOSIS OF LUMBAR REGION, UNSPECIFIED WHETHER NEUROGENIC CLAUDICATION PRESENT: ICD-10-CM

## 2018-06-25 DIAGNOSIS — M51.36 DDD (DEGENERATIVE DISC DISEASE), LUMBAR: ICD-10-CM

## 2018-06-25 DIAGNOSIS — M54.16 LUMBAR RADICULOPATHY: Primary | ICD-10-CM

## 2018-06-25 PROBLEM — M51.369 DDD (DEGENERATIVE DISC DISEASE), LUMBAR: Status: ACTIVE | Noted: 2018-06-25

## 2018-06-25 PROCEDURE — 99203 OFFICE O/P NEW LOW 30 MIN: CPT | Performed by: ORTHOPAEDIC SURGERY

## 2018-06-25 NOTE — PROGRESS NOTES
Assessment/Plan:    Spinal stenosis of lumbar region  Patient presents for evaluation of lower back and left leg pain  She has had symptoms for over 6 months  She reports pain numbness and tingling that radiates into her toes  She also has a history of diabetes  She has been diagnosed with spinal stenosis from L4-S1 as well as acute on chronic L4 compression deformity  She reports wearing a back brace at home  On physical exam she is mildly tender to palpation over the left lumbosacral spine  She has good strength in the L2-S1 distributions bilaterally  Equivocal straight leg raise on the left  MRI imaging demonstrates moderate to severe spinal stenosis L4-5 and L5-S1  Evolving compression fracture at L4  Medrol Dosepak  Recommend pain management for epidural steroid injections targeted at L4-5 and L5-S1  Problem List Items Addressed This Visit     Lumbar radiculopathy - Primary    Relevant Orders    Ambulatory referral to Pain Management    DDD (degenerative disc disease), lumbar    Relevant Orders    Ambulatory referral to Pain Management    Spinal stenosis of lumbar region     Patient presents for evaluation of lower back and left leg pain  She has had symptoms for over 6 months  She reports pain numbness and tingling that radiates into her toes  She also has a history of diabetes  She has been diagnosed with spinal stenosis from L4-S1 as well as acute on chronic L4 compression deformity  She reports wearing a back brace at home  On physical exam she is mildly tender to palpation over the left lumbosacral spine  She has good strength in the L2-S1 distributions bilaterally  Equivocal straight leg raise on the left  MRI imaging demonstrates moderate to severe spinal stenosis L4-5 and L5-S1  Evolving compression fracture at L4  Medrol Dosepak  Recommend pain management for epidural steroid injections targeted at L4-5 and L5-S1           Relevant Orders    Ambulatory referral to Pain Management            Subjective:      Patient ID: Destiney Recio is a 80 y o  female  HPI  Patient presents to the office for low back pain ongoing for roughly 6 months due to falling down the stairs  She experiences left leg pain that travels from her hip to her feet  She notes weakness in her left leg  She has tried physical therapy  She is accompanied by her daughter today in the office  Patient has a history of diabetes and diabetic neuropathy  The following portions of the patient's history were reviewed and updated as appropriate: current medications, past family history, past medical history, past social history, past surgical history and problem list     Review of Systems   Constitutional: Negative for chills and fever  Eyes: Negative for visual disturbance  Respiratory: Negative for shortness of breath  Cardiovascular: Negative for chest pain  Gastrointestinal: Negative for constipation and diarrhea  Musculoskeletal: Positive for back pain  Skin: Negative for rash  Neurological: Positive for weakness  Psychiatric/Behavioral: Negative for behavioral problems  The patient is not nervous/anxious  Objective:      /80   Pulse 92   Ht 5' 1" (1 549 m)   Wt 71 7 kg (158 lb)   BMI 29 85 kg/m²          Physical Exam   Constitutional: She is oriented to person, place, and time  She appears well-developed and well-nourished  HENT:   Head: Normocephalic and atraumatic  Eyes: Pupils are equal, round, and reactive to light  Neck: Neck supple  Cardiovascular: Intact distal pulses  Pulmonary/Chest: Effort normal and breath sounds normal    Neurological: She is alert and oriented to person, place, and time  Skin: Skin is warm and dry  Psychiatric: She has a normal mood and affect   Her behavior is normal        Patient ambulates with the assistance of a cane  Tender to palpation lumbosacral spine  Positive modified straight leg raise on the left, negative on the right  Strength 5/5 L2-S1 bilaterally

## 2018-06-25 NOTE — ASSESSMENT & PLAN NOTE
Patient presents for evaluation of lower back and left leg pain  She has had symptoms for over 6 months  She reports pain numbness and tingling that radiates into her toes  She also has a history of diabetes  She has been diagnosed with spinal stenosis from L4-S1 as well as acute on chronic L4 compression deformity  She reports wearing a back brace at home  On physical exam she is mildly tender to palpation over the left lumbosacral spine  She has good strength in the L2-S1 distributions bilaterally  Equivocal straight leg raise on the left  MRI imaging demonstrates moderate to severe spinal stenosis L4-5 and L5-S1  Evolving compression fracture at L4  Medrol Dosepak  Recommend pain management for epidural steroid injections targeted at L4-5 and L5-S1

## 2018-06-28 ENCOUNTER — TELEPHONE (OUTPATIENT)
Dept: OBGYN CLINIC | Facility: HOSPITAL | Age: 83
End: 2018-06-28

## 2018-06-28 DIAGNOSIS — M54.50 CHRONIC MIDLINE LOW BACK PAIN WITHOUT SCIATICA: Primary | ICD-10-CM

## 2018-06-28 DIAGNOSIS — G89.29 CHRONIC MIDLINE LOW BACK PAIN WITHOUT SCIATICA: Primary | ICD-10-CM

## 2018-06-28 RX ORDER — METHYLPREDNISOLONE 4 MG/1
TABLET ORAL
Qty: 21 TABLET | Refills: 0 | Status: SHIPPED | OUTPATIENT
Start: 2018-06-28 | End: 2019-01-07 | Stop reason: ALTCHOICE

## 2018-06-28 RX ORDER — METHYLPREDNISOLONE 4 MG/1
TABLET ORAL
Qty: 21 TABLET | Refills: 0 | Status: CANCELLED | OUTPATIENT
Start: 2018-06-28

## 2018-06-28 NOTE — TELEPHONE ENCOUNTER
Pharmacy in the system  I did not see prednisone in the system for her  Can you please reorder or I can call it in   Thank you

## 2018-06-28 NOTE — TELEPHONE ENCOUNTER
Caller: patient  Call back number: [phone]  Patient's doctor: Dr Madeleine Simmons    Patient called stating that she was supposed to be prescribed prednisone  She states that this was never sent to the pharmacy  She uses [facility] on [address]

## 2018-09-08 ENCOUNTER — APPOINTMENT (OUTPATIENT)
Dept: LAB | Facility: CLINIC | Age: 83
End: 2018-09-08
Payer: MEDICARE

## 2018-09-08 ENCOUNTER — TRANSCRIBE ORDERS (OUTPATIENT)
Dept: LAB | Facility: CLINIC | Age: 83
End: 2018-09-08

## 2018-09-08 DIAGNOSIS — E03.9 MYXEDEMA HEART DISEASE: ICD-10-CM

## 2018-09-08 DIAGNOSIS — Z79.899 NEED FOR PROPHYLACTIC CHEMOTHERAPY: ICD-10-CM

## 2018-09-08 DIAGNOSIS — I10 ESSENTIAL HYPERTENSION, MALIGNANT: ICD-10-CM

## 2018-09-08 DIAGNOSIS — E78.5 HYPERLIPEMIA, IDIOPATHIC FAMILIAL: ICD-10-CM

## 2018-09-08 DIAGNOSIS — E11.9 DIABETES MELLITUS WITHOUT COMPLICATION (HCC): ICD-10-CM

## 2018-09-08 DIAGNOSIS — I51.9 MYXEDEMA HEART DISEASE: ICD-10-CM

## 2018-09-08 DIAGNOSIS — I10 ESSENTIAL HYPERTENSION, MALIGNANT: Primary | ICD-10-CM

## 2018-09-08 LAB
ALBUMIN SERPL BCP-MCNC: 3.5 G/DL (ref 3.5–5)
ALP SERPL-CCNC: 79 U/L (ref 46–116)
ALT SERPL W P-5'-P-CCNC: 25 U/L (ref 12–78)
ANION GAP SERPL CALCULATED.3IONS-SCNC: 12 MMOL/L (ref 4–13)
AST SERPL W P-5'-P-CCNC: 17 U/L (ref 5–45)
BASOPHILS # BLD AUTO: 0.01 THOUSANDS/ΜL (ref 0–0.1)
BASOPHILS NFR BLD AUTO: 0 % (ref 0–1)
BILIRUB SERPL-MCNC: 0.4 MG/DL (ref 0.2–1)
BUN SERPL-MCNC: 32 MG/DL (ref 5–25)
CALCIUM SERPL-MCNC: 9.6 MG/DL (ref 8.3–10.1)
CHLORIDE SERPL-SCNC: 103 MMOL/L (ref 100–108)
CHOLEST SERPL-MCNC: 146 MG/DL (ref 50–200)
CO2 SERPL-SCNC: 25 MMOL/L (ref 21–32)
CREAT SERPL-MCNC: 1.78 MG/DL (ref 0.6–1.3)
EOSINOPHIL # BLD AUTO: 0 THOUSAND/ΜL (ref 0–0.61)
EOSINOPHIL NFR BLD AUTO: 0 % (ref 0–6)
ERYTHROCYTE [DISTWIDTH] IN BLOOD BY AUTOMATED COUNT: 14.2 % (ref 11.6–15.1)
EST. AVERAGE GLUCOSE BLD GHB EST-MCNC: 166 MG/DL
GFR SERPL CREATININE-BSD FRML MDRD: 29 ML/MIN/1.73SQ M
GLUCOSE P FAST SERPL-MCNC: 306 MG/DL (ref 65–99)
HBA1C MFR BLD: 7.4 % (ref 4.2–6.3)
HCT VFR BLD AUTO: 38.9 % (ref 34.8–46.1)
HDLC SERPL-MCNC: 80 MG/DL (ref 40–60)
HGB BLD-MCNC: 12.7 G/DL (ref 11.5–15.4)
IMM GRANULOCYTES # BLD AUTO: 0.08 THOUSAND/UL (ref 0–0.2)
IMM GRANULOCYTES NFR BLD AUTO: 1 % (ref 0–2)
LDLC SERPL CALC-MCNC: 55 MG/DL (ref 0–100)
LYMPHOCYTES # BLD AUTO: 1.3 THOUSANDS/ΜL (ref 0.6–4.47)
LYMPHOCYTES NFR BLD AUTO: 12 % (ref 14–44)
MCH RBC QN AUTO: 27.7 PG (ref 26.8–34.3)
MCHC RBC AUTO-ENTMCNC: 32.6 G/DL (ref 31.4–37.4)
MCV RBC AUTO: 85 FL (ref 82–98)
MONOCYTES # BLD AUTO: 0.68 THOUSAND/ΜL (ref 0.17–1.22)
MONOCYTES NFR BLD AUTO: 6 % (ref 4–12)
NEUTROPHILS # BLD AUTO: 8.79 THOUSANDS/ΜL (ref 1.85–7.62)
NEUTS SEG NFR BLD AUTO: 81 % (ref 43–75)
NRBC BLD AUTO-RTO: 0 /100 WBCS
PLATELET # BLD AUTO: 274 THOUSANDS/UL (ref 149–390)
PMV BLD AUTO: 10 FL (ref 8.9–12.7)
POTASSIUM SERPL-SCNC: 4.4 MMOL/L (ref 3.5–5.3)
PROT SERPL-MCNC: 8.4 G/DL (ref 6.4–8.2)
RBC # BLD AUTO: 4.58 MILLION/UL (ref 3.81–5.12)
SODIUM SERPL-SCNC: 140 MMOL/L (ref 136–145)
TRIGL SERPL-MCNC: 56 MG/DL
TSH SERPL DL<=0.05 MIU/L-ACNC: 0.55 UIU/ML (ref 0.36–3.74)
URATE SERPL-MCNC: 5.9 MG/DL (ref 2–6.8)
WBC # BLD AUTO: 10.86 THOUSAND/UL (ref 4.31–10.16)

## 2018-09-08 PROCEDURE — 83036 HEMOGLOBIN GLYCOSYLATED A1C: CPT

## 2018-09-08 PROCEDURE — 84550 ASSAY OF BLOOD/URIC ACID: CPT

## 2018-09-08 PROCEDURE — 36415 COLL VENOUS BLD VENIPUNCTURE: CPT

## 2018-09-08 PROCEDURE — 84443 ASSAY THYROID STIM HORMONE: CPT

## 2018-09-08 PROCEDURE — 80061 LIPID PANEL: CPT

## 2018-09-08 PROCEDURE — 80053 COMPREHEN METABOLIC PANEL: CPT

## 2018-09-08 PROCEDURE — 85025 COMPLETE CBC W/AUTO DIFF WBC: CPT

## 2018-11-26 ENCOUNTER — TRANSCRIBE ORDERS (OUTPATIENT)
Dept: ADMINISTRATIVE | Facility: HOSPITAL | Age: 83
End: 2018-11-26

## 2018-11-26 DIAGNOSIS — I73.9 PERIPHERAL VASCULAR DISEASE, UNSPECIFIED (HCC): Primary | ICD-10-CM

## 2018-12-04 ENCOUNTER — HOSPITAL ENCOUNTER (OUTPATIENT)
Dept: NON INVASIVE DIAGNOSTICS | Facility: CLINIC | Age: 83
Discharge: HOME | End: 2018-12-04
Payer: MEDICARE

## 2018-12-04 DIAGNOSIS — I73.9 PERIPHERAL VASCULAR DISEASE, UNSPECIFIED (HCC): ICD-10-CM

## 2018-12-04 PROCEDURE — 93923 UPR/LXTR ART STDY 3+ LVLS: CPT

## 2018-12-04 PROCEDURE — 93925 LOWER EXTREMITY STUDY: CPT

## 2018-12-05 PROCEDURE — 93925 LOWER EXTREMITY STUDY: CPT | Performed by: SURGERY

## 2018-12-05 PROCEDURE — 93922 UPR/L XTREMITY ART 2 LEVELS: CPT | Performed by: SURGERY

## 2019-01-07 ENCOUNTER — OFFICE VISIT (OUTPATIENT)
Dept: VASCULAR SURGERY | Facility: CLINIC | Age: 84
End: 2019-01-07
Payer: MEDICARE

## 2019-01-07 VITALS
HEIGHT: 61 IN | SYSTOLIC BLOOD PRESSURE: 166 MMHG | WEIGHT: 166 LBS | HEART RATE: 88 BPM | RESPIRATION RATE: 20 BRPM | DIASTOLIC BLOOD PRESSURE: 78 MMHG | BODY MASS INDEX: 31.34 KG/M2 | TEMPERATURE: 97.8 F

## 2019-01-07 DIAGNOSIS — E11.51 DIABETES MELLITUS WITH PERIPHERAL ARTERY DISEASE (HCC): Primary | ICD-10-CM

## 2019-01-07 PROCEDURE — 99203 OFFICE O/P NEW LOW 30 MIN: CPT | Performed by: SURGERY

## 2019-01-07 NOTE — ASSESSMENT & PLAN NOTE
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

## 2019-01-07 NOTE — LETTER
January 7, 2019     Radhalora GoelBlueDO sisi  [address] [place] [address]    Patient: Kamron Cartagena   YOB: 1928   Date of Visit: 1/7/2019       Dear Dr Loren Campbell: Thank you for referring Kamron Cartagena to me for evaluation  Below are my notes for this consultation  If you have questions, please do not hesitate to call me  I look forward to following your patient along with you  Sincerely,        Cr Meehan MD        CC: No Recipients  Cr Meehan MD  1/7/2019 12:17 PM  Sign at close encounter  Assessment/Plan:    No problem-specific Assessment & Plan notes found for this encounter  There are no diagnoses linked to this encounter  Subjective:      Patient ID: Kamron Cartagena is a 80 y o  female  Patient had MIGEL 12/4/18  Patient has claudication daily  Pain has pain in her legs at nightly  Patient states she has numbness but attributes it to neuropathy  Patient has a hx of diabetes, HTN, HLD, and PVD  Patient takes ASA  Patient presents for evaluation of bilateral lower extremity numbness  Patient denies history of tissue loss  Patient has long-standing diabetes  Patient underwent noninvasive vascular testing which revealed an NESTOR on the right of 1 13 and on the left of 0 95  I believe the report Ms  Stated the NESTOR to the left lower extremity had 0 64; this will be addended  The patient does have some atherosclerotic disease in the tibial vessels bilaterally however I do not believe this is contributing to her symptoms but rather attributable to neuropathy  Therefore in this 28-year-old female, no further vascular interrogation were intervention would be recommended  The following portions of the patient's history were reviewed and updated as appropriate: allergies, current medications, past family history, past medical history, past social history, past surgical history and problem list     Review of Systems   Constitutional: Negative  HENT: Negative  Eyes: Negative  Respiratory: Negative  Cardiovascular: Negative  Gastrointestinal: Negative  Endocrine: Negative  Genitourinary: Negative  Musculoskeletal:        Leg pain   Skin: Negative  Allergic/Immunologic: Negative  Neurological: Negative  Psychiatric/Behavioral: Negative  Objective:      /78 (BP Location: Right arm, Patient Position: Sitting)   Pulse 88   Temp 97 8 °F (36 6 °C)   Resp 20   Ht 5' 1" (1 549 m)   Wt 75 3 kg (166 lb)   BMI 31 37 kg/m²           Physical Exam   Cardiovascular:   Palpable bilateral femoral arteries  Right lower extremity:  Robust Doppler signals at the DP PT and peroneal     Left lower extremity:  Robust Doppler signal at the DP    No signal present at the posterior tibial   Monophasic signal present at the peroneal

## 2019-01-07 NOTE — ASSESSMENT & PLAN NOTE
Lab Results   Component Value Date    HGBA1C 7 4 (H) 09/08/2018       No results for input(s): POCGLU in the last 72 hours      Blood Sugar Average: Last 72 hrs:

## 2019-01-07 NOTE — PROGRESS NOTES
Assessment/Plan:    No problem-specific Assessment & Plan notes found for this encounter  There are no diagnoses linked to this encounter  Subjective:      Patient ID: Nicolas Williamson is a 80 y o  female  Patient had MIGEL 12/4/18  Patient has claudication daily  Pain has pain in her legs at nightly  Patient states she has numbness but attributes it to neuropathy  Patient has a hx of diabetes, HTN, HLD, and PVD  Patient takes ASA  Patient presents for evaluation of bilateral lower extremity numbness  Patient denies history of tissue loss  Patient has long-standing diabetes  Patient underwent noninvasive vascular testing which revealed an NESTOR on the right of 1 13 and on the left of 0 95  I believe the report Ms  Stated the NESTOR to the left lower extremity had 0 64; this will be addended  The patient does have some atherosclerotic disease in the tibial vessels bilaterally however I do not believe this is contributing to her symptoms but rather attributable to neuropathy  Therefore in this 72-year-old female, no further vascular interrogation were intervention would be recommended  The following portions of the patient's history were reviewed and updated as appropriate: allergies, current medications, past family history, past medical history, past social history, past surgical history and problem list     Review of Systems   Constitutional: Negative  HENT: Negative  Eyes: Negative  Respiratory: Negative  Cardiovascular: Negative  Gastrointestinal: Negative  Endocrine: Negative  Genitourinary: Negative  Musculoskeletal:        Leg pain   Skin: Negative  Allergic/Immunologic: Negative  Neurological: Negative  Psychiatric/Behavioral: Negative            Objective:      /78 (BP Location: Right arm, Patient Position: Sitting)   Pulse 88   Temp 97 8 °F (36 6 °C)   Resp 20   Ht 5' 1" (1 549 m)   Wt 75 3 kg (166 lb)   BMI 31 37 kg/m²          Physical Exam Cardiovascular:   Palpable bilateral femoral arteries  Right lower extremity:  Robust Doppler signals at the DP PT and peroneal     Left lower extremity:  Robust Doppler signal at the DP    No signal present at the posterior tibial   Monophasic signal present at the peroneal

## 2019-01-29 ENCOUNTER — TRANSCRIBE ORDERS (OUTPATIENT)
Dept: LAB | Facility: CLINIC | Age: 84
End: 2019-01-29

## 2019-01-29 ENCOUNTER — APPOINTMENT (OUTPATIENT)
Dept: LAB | Facility: CLINIC | Age: 84
End: 2019-01-29
Payer: MEDICARE

## 2019-01-29 DIAGNOSIS — R73.01 IMPAIRED FASTING GLUCOSE: ICD-10-CM

## 2019-01-29 DIAGNOSIS — Z79.899 LONG TERM USE OF DRUG: ICD-10-CM

## 2019-01-29 DIAGNOSIS — E03.9 HYPOTHYROIDISM, ACQUIRED: Primary | ICD-10-CM

## 2019-01-29 DIAGNOSIS — Z79.899 NEED FOR PROPHYLACTIC CHEMOTHERAPY: ICD-10-CM

## 2019-01-29 DIAGNOSIS — I10 HYPERTENSION, UNSPECIFIED TYPE: ICD-10-CM

## 2019-01-29 LAB
ALBUMIN SERPL BCP-MCNC: 3.3 G/DL (ref 3.5–5)
ALP SERPL-CCNC: 82 U/L (ref 46–116)
ALT SERPL W P-5'-P-CCNC: 18 U/L (ref 12–78)
ANION GAP SERPL CALCULATED.3IONS-SCNC: 10 MMOL/L (ref 4–13)
AST SERPL W P-5'-P-CCNC: 16 U/L (ref 5–45)
BASOPHILS # BLD AUTO: 0.05 THOUSANDS/ΜL (ref 0–0.1)
BASOPHILS NFR BLD AUTO: 1 % (ref 0–1)
BILIRUB SERPL-MCNC: 0.3 MG/DL (ref 0.2–1)
BUN SERPL-MCNC: 32 MG/DL (ref 5–25)
CALCIUM SERPL-MCNC: 9.4 MG/DL (ref 8.3–10.1)
CHLORIDE SERPL-SCNC: 100 MMOL/L (ref 100–108)
CO2 SERPL-SCNC: 29 MMOL/L (ref 21–32)
CREAT SERPL-MCNC: 1.95 MG/DL (ref 0.6–1.3)
EOSINOPHIL # BLD AUTO: 0.34 THOUSAND/ΜL (ref 0–0.61)
EOSINOPHIL NFR BLD AUTO: 6 % (ref 0–6)
ERYTHROCYTE [DISTWIDTH] IN BLOOD BY AUTOMATED COUNT: 13.7 % (ref 11.6–15.1)
EST. AVERAGE GLUCOSE BLD GHB EST-MCNC: 209 MG/DL
GFR SERPL CREATININE-BSD FRML MDRD: 26 ML/MIN/1.73SQ M
GLUCOSE SERPL-MCNC: 233 MG/DL (ref 65–140)
HBA1C MFR BLD: 8.9 % (ref 4.2–6.3)
HCT VFR BLD AUTO: 37.7 % (ref 34.8–46.1)
HGB BLD-MCNC: 12.6 G/DL (ref 11.5–15.4)
IMM GRANULOCYTES # BLD AUTO: 0.02 THOUSAND/UL (ref 0–0.2)
IMM GRANULOCYTES NFR BLD AUTO: 0 % (ref 0–2)
LYMPHOCYTES # BLD AUTO: 1.81 THOUSANDS/ΜL (ref 0.6–4.47)
LYMPHOCYTES NFR BLD AUTO: 30 % (ref 14–44)
MCH RBC QN AUTO: 28.6 PG (ref 26.8–34.3)
MCHC RBC AUTO-ENTMCNC: 33.4 G/DL (ref 31.4–37.4)
MCV RBC AUTO: 86 FL (ref 82–98)
MONOCYTES # BLD AUTO: 0.43 THOUSAND/ΜL (ref 0.17–1.22)
MONOCYTES NFR BLD AUTO: 7 % (ref 4–12)
NEUTROPHILS # BLD AUTO: 3.36 THOUSANDS/ΜL (ref 1.85–7.62)
NEUTS SEG NFR BLD AUTO: 56 % (ref 43–75)
NRBC BLD AUTO-RTO: 0 /100 WBCS
PLATELET # BLD AUTO: 235 THOUSANDS/UL (ref 149–390)
PMV BLD AUTO: 9.9 FL (ref 8.9–12.7)
POTASSIUM SERPL-SCNC: 3.8 MMOL/L (ref 3.5–5.3)
PROT SERPL-MCNC: 7.8 G/DL (ref 6.4–8.2)
RBC # BLD AUTO: 4.4 MILLION/UL (ref 3.81–5.12)
SODIUM SERPL-SCNC: 139 MMOL/L (ref 136–145)
URATE SERPL-MCNC: 6 MG/DL (ref 2–6.8)
WBC # BLD AUTO: 6.01 THOUSAND/UL (ref 4.31–10.16)

## 2019-01-29 PROCEDURE — 84550 ASSAY OF BLOOD/URIC ACID: CPT

## 2019-01-29 PROCEDURE — 36415 COLL VENOUS BLD VENIPUNCTURE: CPT

## 2019-01-29 PROCEDURE — 80053 COMPREHEN METABOLIC PANEL: CPT

## 2019-01-29 PROCEDURE — 85025 COMPLETE CBC W/AUTO DIFF WBC: CPT

## 2019-01-29 PROCEDURE — 84443 ASSAY THYROID STIM HORMONE: CPT

## 2019-01-29 PROCEDURE — 83036 HEMOGLOBIN GLYCOSYLATED A1C: CPT

## 2019-01-30 ENCOUNTER — APPOINTMENT (OUTPATIENT)
Dept: LAB | Facility: CLINIC | Age: 84
End: 2019-01-30
Payer: MEDICARE

## 2019-01-30 DIAGNOSIS — Z79.899 LONG TERM USE OF DRUG: ICD-10-CM

## 2019-01-30 DIAGNOSIS — R73.01 IMPAIRED FASTING GLUCOSE: ICD-10-CM

## 2019-01-30 DIAGNOSIS — I10 HYPERTENSION, UNSPECIFIED TYPE: ICD-10-CM

## 2019-01-30 DIAGNOSIS — E03.9 HYPOTHYROIDISM, ACQUIRED: ICD-10-CM

## 2019-01-30 LAB
CHOLEST SERPL-MCNC: 139 MG/DL (ref 50–200)
HDLC SERPL-MCNC: 67 MG/DL (ref 40–60)
LDLC SERPL CALC-MCNC: 50 MG/DL (ref 0–100)
NONHDLC SERPL-MCNC: 72 MG/DL
TRIGL SERPL-MCNC: 112 MG/DL

## 2019-01-30 PROCEDURE — 80061 LIPID PANEL: CPT

## 2019-01-30 PROCEDURE — 36415 COLL VENOUS BLD VENIPUNCTURE: CPT

## 2019-02-04 LAB — MISCELLANEOUS LAB TEST RESULT: NORMAL

## 2019-06-10 ENCOUNTER — OFFICE VISIT (OUTPATIENT)
Dept: PODIATRY | Facility: CLINIC | Age: 84
End: 2019-06-10
Payer: MEDICARE

## 2019-06-10 VITALS
HEIGHT: 61 IN | BODY MASS INDEX: 31.34 KG/M2 | SYSTOLIC BLOOD PRESSURE: 126 MMHG | DIASTOLIC BLOOD PRESSURE: 71 MMHG | WEIGHT: 166 LBS

## 2019-06-10 DIAGNOSIS — E11.42 DIABETIC POLYNEUROPATHY ASSOCIATED WITH TYPE 2 DIABETES MELLITUS (HCC): Primary | ICD-10-CM

## 2019-06-10 DIAGNOSIS — L60.3 NAIL DYSTROPHY: ICD-10-CM

## 2019-06-10 PROCEDURE — 99213 OFFICE O/P EST LOW 20 MIN: CPT | Performed by: PODIATRIST

## 2019-06-10 PROCEDURE — 11719 TRIM NAIL(S) ANY NUMBER: CPT | Performed by: PODIATRIST

## 2019-06-10 RX ORDER — EMPAGLIFLOZIN 10 MG/1
10 TABLET, FILM COATED ORAL DAILY
Refills: 0 | COMMUNITY
Start: 2019-04-16

## 2019-06-10 RX ORDER — PEN NEEDLE, DIABETIC 32GX 5/32"
NEEDLE, DISPOSABLE MISCELLANEOUS
Refills: 0 | COMMUNITY
Start: 2019-03-19

## 2019-06-10 RX ORDER — COLCHICINE 0.6 MG/1
0.6 TABLET, FILM COATED ORAL 2 TIMES DAILY
Refills: 0 | COMMUNITY
Start: 2019-03-05

## 2019-06-10 RX ORDER — LEVOTHYROXINE SODIUM 0.05 MG/1
100 TABLET ORAL DAILY
Refills: 1 | COMMUNITY
Start: 2019-04-04 | End: 2019-06-10 | Stop reason: SDUPTHER

## 2019-06-10 RX ORDER — VALSARTAN AND HYDROCHLOROTHIAZIDE 160; 12.5 MG/1; MG/1
1 TABLET, FILM COATED ORAL DAILY
Refills: 0 | COMMUNITY
Start: 2019-04-22 | End: 2019-06-10 | Stop reason: SDUPTHER

## 2019-06-10 RX ORDER — ALBUTEROL SULFATE 90 UG/1
2 AEROSOL, METERED RESPIRATORY (INHALATION) EVERY 4 HOURS PRN
Refills: 0 | COMMUNITY
Start: 2019-04-16

## 2019-06-10 RX ORDER — OXYBUTYNIN CHLORIDE 10 MG/1
10 TABLET, EXTENDED RELEASE ORAL DAILY
Refills: 0 | COMMUNITY
Start: 2019-04-22

## 2019-06-10 RX ORDER — LANCETS 33 GAUGE
EACH MISCELLANEOUS
Refills: 1 | COMMUNITY
Start: 2019-04-22

## 2019-06-10 RX ORDER — LINAGLIPTIN 5 MG/1
5 TABLET, FILM COATED ORAL DAILY
Refills: 1 | COMMUNITY
Start: 2019-04-17 | End: 2019-08-16 | Stop reason: ALTCHOICE

## 2019-08-12 ENCOUNTER — OFFICE VISIT (OUTPATIENT)
Dept: PODIATRY | Facility: CLINIC | Age: 84
End: 2019-08-12
Payer: MEDICARE

## 2019-08-12 VITALS
BODY MASS INDEX: 30.21 KG/M2 | HEART RATE: 84 BPM | WEIGHT: 160 LBS | DIASTOLIC BLOOD PRESSURE: 91 MMHG | SYSTOLIC BLOOD PRESSURE: 141 MMHG | HEIGHT: 61 IN

## 2019-08-12 DIAGNOSIS — L84 CORNS: ICD-10-CM

## 2019-08-12 DIAGNOSIS — E11.42 DIABETIC POLYNEUROPATHY ASSOCIATED WITH TYPE 2 DIABETES MELLITUS (HCC): Primary | ICD-10-CM

## 2019-08-12 PROCEDURE — 11719 TRIM NAIL(S) ANY NUMBER: CPT | Performed by: PODIATRIST

## 2019-08-12 PROCEDURE — 11055 PARING/CUTG B9 HYPRKER LES 1: CPT | Performed by: PODIATRIST

## 2019-08-12 NOTE — PROGRESS NOTES
Established patient with class findings presents for nail and lesion care  Vascular exam:  DP  0/4 bilateral; PT  0 4 bilateral   Dermatological exam:  Each toenail is thick and  Dystrophic  HD right 5th toe  Diagnosis:  Diabetes mellitus; hyperkeratotic lesion right 5th toe  Treatment: Trimmed multiple dystrophic toenails  Trimmed hyperkeratotic lesion right  5th toe Nail removal  Date/Time: 8/12/2019 12:15 PM  Performed by: Lucita Coronado DPM  Authorized by: Lucita Coronado DPM     Patient location:  Clinic  Anesthesia (see MAR for exact dosages): Anesthesia method:  None  Nails trimmed:     Number of nails trimmed:  10  Post-procedure details:     Patient tolerance of procedure:   Tolerated well, no immediate complications  Lesion Destruction  Date/Time: 8/12/2019 12:16 PM  Performed by: Lucita Coronado DPM  Authorized by: Lucita Coronado DPM     Procedure Details - Lesion Destruction:     Number of Lesions:  1  Lesion 1:     Body area:  Lower extremity    Lower extremity location:  R little toe    Malignancy: benign hyperkeratotic lesion      Destruction method: scissors used for extraction         patient is rescheduled in 3 months

## 2019-08-16 ENCOUNTER — OFFICE VISIT (OUTPATIENT)
Dept: OBGYN CLINIC | Facility: CLINIC | Age: 84
End: 2019-08-16
Payer: MEDICARE

## 2019-08-16 VITALS
WEIGHT: 153 LBS | DIASTOLIC BLOOD PRESSURE: 76 MMHG | BODY MASS INDEX: 28.16 KG/M2 | SYSTOLIC BLOOD PRESSURE: 130 MMHG | HEIGHT: 62 IN

## 2019-08-16 DIAGNOSIS — L90.0 LICHEN SCLEROSUS: Primary | ICD-10-CM

## 2019-08-16 DIAGNOSIS — B37.3 VAGINA, CANDIDIASIS: ICD-10-CM

## 2019-08-16 PROCEDURE — 99203 OFFICE O/P NEW LOW 30 MIN: CPT | Performed by: OBSTETRICS & GYNECOLOGY

## 2019-08-16 RX ORDER — FLUCONAZOLE 150 MG/1
150 TABLET ORAL ONCE
Qty: 3 TABLET | Refills: 0 | Status: SHIPPED | OUTPATIENT
Start: 2019-08-16 | End: 2019-08-16

## 2019-08-16 RX ORDER — CLOBETASOL PROPIONATE 0.5 MG/G
OINTMENT TOPICAL 2 TIMES DAILY
Qty: 30 G | Refills: 0 | Status: SHIPPED | OUTPATIENT
Start: 2019-08-16 | End: 2019-09-20

## 2019-08-17 PROBLEM — J45.42 MODERATE PERSISTENT ASTHMA WITH STATUS ASTHMATICUS: Status: ACTIVE | Noted: 2018-09-04

## 2019-08-17 PROBLEM — I65.22 CAROTID STENOSIS, ASYMPTOMATIC, LEFT: Status: ACTIVE | Noted: 2019-08-17

## 2019-08-17 PROBLEM — J44.89 COPD (CHRONIC OBSTRUCTIVE PULMONARY DISEASE) WITH CHRONIC BRONCHITIS: Status: ACTIVE | Noted: 2018-01-26

## 2019-08-17 PROBLEM — M48.56XD NON-TRAUMATIC COMPRESSION FRACTURE OF LUMBAR VERTEBRA WITH ROUTINE HEALING: Status: ACTIVE | Noted: 2018-01-26

## 2019-08-17 PROBLEM — J44.9 COPD (CHRONIC OBSTRUCTIVE PULMONARY DISEASE) WITH CHRONIC BRONCHITIS (HCC): Status: ACTIVE | Noted: 2018-01-26

## 2019-08-17 PROBLEM — IMO0002 UNCONTROLLED TYPE 2 DIABETES MELLITUS WITH CHRONIC KIDNEY DISEASE: Status: ACTIVE | Noted: 2019-02-01

## 2019-08-17 PROBLEM — E11.9 DM2 (DIABETES MELLITUS, TYPE 2) (HCC): Chronic | Status: RESOLVED | Noted: 2018-03-01 | Resolved: 2019-08-17

## 2019-08-17 PROBLEM — E03.9 HYPOTHYROIDISM: Status: ACTIVE | Noted: 2018-01-26

## 2019-08-17 PROBLEM — I10 ESSENTIAL HYPERTENSION: Status: ACTIVE | Noted: 2018-01-26

## 2019-08-17 PROBLEM — S00.03XA HEMATOMA OF FRONTAL SCALP: Status: RESOLVED | Noted: 2018-03-01 | Resolved: 2019-08-17

## 2019-08-17 PROBLEM — J44.9 COPD (CHRONIC OBSTRUCTIVE PULMONARY DISEASE) (HCC): Chronic | Status: RESOLVED | Noted: 2018-03-01 | Resolved: 2019-08-17

## 2019-08-17 PROBLEM — M85.89 OSTEOPENIA OF MULTIPLE SITES: Status: ACTIVE | Noted: 2018-01-26

## 2019-08-17 PROBLEM — G31.84 MILD COGNITIVE IMPAIRMENT: Status: ACTIVE | Noted: 2018-04-20

## 2019-08-17 NOTE — PROGRESS NOTES
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  It also appears that she a lichen sclerosis  Will treat with clobetasol  Given patient's poorly controlled diabetes will also treat for candidiasis  Patient has refills for this medication  She will return in 1 month for re-evaluation  If not significantly better will recommend doing a biopsy  Lichen sclerosus  -     clobetasol (TEMOVATE) 0 05 % ointment; Apply topically 2 (two) times a day Apply nightly for 1 week, then every other night    Vagina, candidiasis  -     fluconazole (DIFLUCAN) 150 mg tablet; Take 1 tablet (150 mg total) by mouth once for 1 dose        Subjective:      Patient ID: Dona Roman is a 80 y o  female  HPI  89y who presents with vaginal itching for about a week  She denies any discharge or odor  She has used vaginasil without relief  She denies any vaginal bleeding  She has had a hysterectomy in the past   She was also diagnosed with vitiligo  She has had itching in the past and was treated with a cream that she cannot remember the name of  The following portions of the patient's history were reviewed and updated as appropriate: allergies, current medications, past family history, past medical history, past social history, past surgical history and problem list     Review of Systems   Constitutional: Negative  Gastrointestinal: Negative  Genitourinary: Positive for genital sores  Negative for vaginal bleeding, vaginal discharge and vaginal pain  Skin: Positive for color change, pallor and rash  Negative for wound  Objective:      /76   Ht 5' 1 5" (1 562 m)   Wt 69 4 kg (153 lb)   Breastfeeding? No   BMI 28 44 kg/m²          Physical Exam   Constitutional: She is oriented to person, place, and time  She appears well-developed and well-nourished  No distress  HENT:   Head: Normocephalic and atraumatic  Pulmonary/Chest: Effort normal    Genitourinary:       No labial fusion   There is rash on the right labia  There is no tenderness or lesion on the right labia  There is rash on the left labia  There is no tenderness or lesion on the left labia  Genitourinary Comments: Vitilego appearance of the labia minora  White plaque-like appearance over the deep pigmented skin  No discharge  Nontender  No masses   Neurological: She is alert and oriented to person, place, and time  Skin: Skin is warm and dry  She is not diaphoretic  No erythema  No pallor  Nursing note and vitals reviewed

## 2019-09-20 ENCOUNTER — OFFICE VISIT (OUTPATIENT)
Dept: OBGYN CLINIC | Facility: CLINIC | Age: 84
End: 2019-09-20
Payer: MEDICARE

## 2019-09-20 VITALS — DIASTOLIC BLOOD PRESSURE: 74 MMHG | BODY MASS INDEX: 28.63 KG/M2 | WEIGHT: 154 LBS | SYSTOLIC BLOOD PRESSURE: 128 MMHG

## 2019-09-20 DIAGNOSIS — L90.0 LICHEN SCLEROSUS: ICD-10-CM

## 2019-09-20 PROCEDURE — 99213 OFFICE O/P EST LOW 20 MIN: CPT | Performed by: OBSTETRICS & GYNECOLOGY

## 2019-09-20 RX ORDER — CLOBETASOL PROPIONATE 0.5 MG/G
OINTMENT TOPICAL 3 TIMES WEEKLY
Qty: 30 G | Refills: 4 | Status: SHIPPED | OUTPATIENT
Start: 2019-09-20 | End: 2019-11-07 | Stop reason: SDUPTHER

## 2019-09-20 RX ORDER — CLOBETASOL PROPIONATE 0.5 MG/G
OINTMENT TOPICAL
COMMUNITY
Start: 2019-08-16

## 2019-09-20 NOTE — PROGRESS NOTES
Assessment/Plan:    Lichen sclerosus  -     clobetasol (TEMOVATE) 0 05 % ointment; Apply topically 3 (three) times a week        - maintenence therapy indefinitely  Other orders  -     clobetasol (TEMOVATE) 0 05 % ointment; APPLY TOPICALLY 2 (TWO) TIMES A DAY APPLY NIGHTLY FOR 1 WEEK, THEN EVERY OTHER NIGHT      Skin plaques - go to dermatology appt, Aquaphor moisturizer in the mean time     Subjective:      Patient ID: Rajeev Price is a 80 y o  female  HPI  89y P3 who presents with f/u of lichen sclerosis  The itching and irritation has improved  She is currently using the clobetasol every other day  She denies vaginal bleeding and discharge  Since her last visit however, she has started to develop itchy plaques in various locations - hands, back, abd, arms and legs  They are dry and crack  She is on a wait list to see a dermatologist - end of Oct  The following portions of the patient's history were reviewed and updated as appropriate: allergies, current medications, past family history, past medical history, past social history, past surgical history and problem list     Review of Systems   Constitutional: Negative  HENT: Negative  Respiratory: Negative  Gastrointestinal: Negative  Genitourinary: Negative  Skin: Positive for rash  Neurological: Negative  Objective:      /74   Wt 69 9 kg (154 lb)   BMI 28 63 kg/m²          Physical Exam   Constitutional: She is oriented to person, place, and time  She appears well-developed and well-nourished  No distress  Genitourinary: There is no rash, tenderness or lesion on the right labia  There is no rash, tenderness or lesion on the left labia  Genitourinary Comments: Lack of pigmentation around introitus, and labia  White lichen appearance has resolved   Neurological: She is alert and oriented to person, place, and time  Skin: Skin is warm and dry  Rash (several plaques - 3cm, dry, cracked) noted  She is not diaphoretic  No erythema  No pallor  Nursing note and vitals reviewed

## 2019-10-17 ENCOUNTER — OFFICE VISIT (OUTPATIENT)
Dept: PULMONOLOGY | Facility: HOSPITAL | Age: 84
End: 2019-10-17
Payer: MEDICARE

## 2019-10-17 VITALS
TEMPERATURE: 98.1 F | DIASTOLIC BLOOD PRESSURE: 76 MMHG | WEIGHT: 156 LBS | BODY MASS INDEX: 29.45 KG/M2 | HEART RATE: 83 BPM | SYSTOLIC BLOOD PRESSURE: 120 MMHG | OXYGEN SATURATION: 98 % | HEIGHT: 61 IN

## 2019-10-17 DIAGNOSIS — I10 ESSENTIAL HYPERTENSION: ICD-10-CM

## 2019-10-17 DIAGNOSIS — J44.9 COPD (CHRONIC OBSTRUCTIVE PULMONARY DISEASE) WITH CHRONIC BRONCHITIS (HCC): ICD-10-CM

## 2019-10-17 DIAGNOSIS — J44.9 CHRONIC OBSTRUCTIVE PULMONARY DISEASE, UNSPECIFIED COPD TYPE (HCC): Primary | ICD-10-CM

## 2019-10-17 PROCEDURE — 94010 BREATHING CAPACITY TEST: CPT | Performed by: INTERNAL MEDICINE

## 2019-10-17 PROCEDURE — 99204 OFFICE O/P NEW MOD 45 MIN: CPT | Performed by: INTERNAL MEDICINE

## 2019-10-17 NOTE — PROGRESS NOTES
Assessment/Plan:    COPD (chronic obstructive pulmonary disease) with chronic bronchitis ([facility] )  Mrs Yuan Roldan has mild obstructive lung disease given her FEV1 ratio however her FEV1 is actually 120% predicted and she is doing quite well  She does have an audible wheeze today although I am hesitant to give her any prednisone given her advanced age and lack of symptoms  I did change of because I do not think she is getting the Flovent MDI to Trelegy 1 puff once a day  In addition of asked to use albuterol in her nebulizer q h s  To help with her nocturnal wheeze  She is up-to-date on her flu and pneumonia shots and I will see her back in 6 months  Diagnoses and all orders for this visit:    Chronic obstructive pulmonary disease, unspecified COPD type ([facility] )  -     POCT spirometry  -     fluticasone-umeclidinium-vilanterol (TRELEGY) 100-62 5-25 MCG/INH inhaler; Inhale 1 puff daily Rinse mouth after use  COPD (chronic obstructive pulmonary disease) with chronic bronchitis ([facility])    Essential hypertension    Other orders  -     Dulaglutide (TRULICITY) 1 5 PH/3 1DX SOPN; Inject 1 5 mg under the skin once a week          Subjective:      Patient ID: Tara Willson is a 80 y o  female  Mrs Yuan Roldan is here for follow-up of her asthma  She has been wheezing for the past several months  Her daughter reports that she hears audible wheeze and cough most nights  She denies any mucus production  The patient denies any dyspnea and she has been using her Flovent twice a day and Anoro once daily  She does not use her rescue inhaler  The following portions of the patient's history were reviewed and updated as appropriate: allergies, current medications, past family history, past medical history, past social history, past surgical history and problem list     Review of Systems   Constitutional: Negative  Negative for unexpected weight change  HENT: Negative  Negative for postnasal drip  Eyes: Negative  Respiratory: Negative  Negative for cough, shortness of breath and wheezing  Cardiovascular: Negative  Negative for chest pain and leg swelling  Gastrointestinal: Negative  Endocrine: Negative  Genitourinary: Negative  Musculoskeletal: Negative  Allergic/Immunologic: Negative  Neurological: Negative  Hematological: Negative  Objective:      /76 (BP Location: Left arm, Patient Position: Sitting, Cuff Size: Standard)   Pulse 83   Temp 98 1 °F (36 7 °C) (Tympanic)   Ht 5' 1" (1 549 m)   Wt 70 8 kg (156 lb)   SpO2 98%   BMI 29 48 kg/m²          Physical Exam   Constitutional: She is oriented to person, place, and time  She appears well-developed and well-nourished  HENT:   Head: Normocephalic  Eyes: Pupils are equal, round, and reactive to light  Neck: Normal range of motion  Neck supple  Cardiovascular: Normal rate  No murmur heard  Pulmonary/Chest: Effort normal and breath sounds normal  No respiratory distress  She has no wheezes  She has no rales  Abdominal: Soft  Musculoskeletal: Normal range of motion  She exhibits no edema  Neurological: She is alert and oriented to person, place, and time  Skin: Skin is warm and dry

## 2019-10-17 NOTE — PATIENT INSTRUCTIONS
STOP Anoro and Flovent  Start Trelegy  Use Albuterol in nebulizer every night before bed  Call if increased wheeze

## 2019-10-17 NOTE — ASSESSMENT & PLAN NOTE
Mrs Jaramillo Sees has mild obstructive lung disease given her FEV1 ratio however her FEV1 is actually 120% predicted and she is doing quite well  She does have an audible wheeze today although I am hesitant to give her any prednisone given her advanced age and lack of symptoms  I did change of because I do not think she is getting the Flovent MDI to Trelegy 1 puff once a day  In addition of asked to use albuterol in her nebulizer q h s  To help with her nocturnal wheeze  She is up-to-date on her flu and pneumonia shots and I will see her back in 6 months

## 2019-10-28 ENCOUNTER — TELEPHONE (OUTPATIENT)
Dept: DERMATOLOGY | Facility: CLINIC | Age: 84
End: 2019-10-28

## 2019-10-28 NOTE — TELEPHONE ENCOUNTER
Called pt to schedule an appt via wait list  Spoke with pt and she stated she is no longer in need of an appt   Removed from wait list

## 2019-11-07 ENCOUNTER — OFFICE VISIT (OUTPATIENT)
Dept: PODIATRY | Facility: CLINIC | Age: 84
End: 2019-11-07
Payer: MEDICARE

## 2019-11-07 VITALS
DIASTOLIC BLOOD PRESSURE: 80 MMHG | BODY MASS INDEX: 29.27 KG/M2 | HEIGHT: 61 IN | WEIGHT: 155 LBS | HEART RATE: 90 BPM | SYSTOLIC BLOOD PRESSURE: 129 MMHG

## 2019-11-07 DIAGNOSIS — E11.42 DIABETIC POLYNEUROPATHY ASSOCIATED WITH TYPE 2 DIABETES MELLITUS (HCC): Primary | ICD-10-CM

## 2019-11-07 PROCEDURE — 99213 OFFICE O/P EST LOW 20 MIN: CPT | Performed by: PODIATRIST

## 2019-11-07 RX ORDER — FLUCONAZOLE 150 MG/1
TABLET ORAL
Refills: 0 | COMMUNITY
Start: 2019-08-16

## 2019-11-07 RX ORDER — VALSARTAN AND HYDROCHLOROTHIAZIDE 160; 12.5 MG/1; MG/1
1 TABLET, FILM COATED ORAL DAILY
COMMUNITY
Start: 2019-10-15 | End: 2019-11-07 | Stop reason: SDUPTHER

## 2019-11-07 NOTE — PROGRESS NOTES
Assessment/Plan:    Discussed principles of diabetic foot care  Even though patient feels that her left foot is numb, sensorium is intact per Parrott-Tommy monofilament and proprioception and vibratory sense is within normal limits  There are no palpable pedal pulses  Treatment consisted of nail and lesion trimming  Patient knows to refrain from walking barefoot  Last A1c was elevated at 8 9  No problem-specific Assessment & Plan notes found for this encounter  Diagnoses and all orders for this visit:    Diabetic polyneuropathy associated with type 2 diabetes mellitus ([facility] )    Other orders  -     Discontinue: valsartan-hydrochlorothiazide (DIOVAN-HCT) 160-12 5 MG per tablet; Take 1 tablet by mouth daily  -     triamcinolone (KENALOG) 0 1 % ointment; Apply 1 application topically 2 (two) times a day To affected area  -     fluconazole (DIFLUCAN) 150 mg tablet; TAKE 1 TABLET BY MOUTH AS ONE DOSE          Subjective:      Patient ID: Amilcar Beckman is a 80 y o  female  HPI     Patient, a 80-year-old type 2 diabetic utilizing insulin for control presents for her yearly diabetic exam and foot care  Patient is doing well and relates discomfort only from elongated toenails  She states that her left foot feels numb  Patient has bunion deformities both feet but they are asymptomatic  The following portions of the patient's history were reviewed and updated as appropriate: allergies, current medications, past family history, past medical history, past social history, past surgical history and problem list     Review of Systems   Gastrointestinal: Negative  Musculoskeletal: Positive for gait problem  Neurological: Positive for numbness  Hematological: Negative  Psychiatric/Behavioral: Negative  Objective:      /80   Pulse 90   Ht 5' 1" (1 549 m)   Wt 70 3 kg (155 lb)   BMI 29 29 kg/m²          Physical Exam   Cardiovascular: Pulses are weak pulses     Feet:   Right Foot: Skin Integrity: Negative for ulcer, skin breakdown, erythema, warmth, callus or dry skin  Left Foot:   Skin Integrity: Negative for ulcer, skin breakdown, erythema, warmth, callus or dry skin  Diabetic Foot Exam    Patient's shoes and socks removed  Right Foot/Ankle   Right Foot Inspection  Skin Exam: skin normal and skin intact no dry skin, no warmth, no callus, no erythema, no maceration, no abnormal color, no pre-ulcer, no ulcer and no callus                          Toe Exam: ROM and strength within normal limits  Sensory   Vibration: intact  Proprioception: intact   Monofilament testing: intact  Vascular  Capillary refills: elevated    Right Toe  - Comprehensive Exam  Ecchymosis: none  Arch: normal  Claw Toes: absent  Swelling: none   Tenderness: none         Left Foot/Ankle  Left Foot Inspection  Skin Exam: skin normal and skin intactno dry skin, no warmth, no erythema, no maceration, normal color, no pre-ulcer, no ulcer and no callus                         Toe Exam: ROM and strength within normal limits                   Sensory   Vibration: intact  Proprioception: intact  Monofilament: intact  Vascular  Capillary refills: elevated    Left Toe  - Comprehensive Exam  Ecchymosis: none  Arch: normal  Hammertoes: absent  Claw toes: absent  Swelling: none   Tenderness: none       Assign Risk Category:  Deformity present;  No loss of protective sensation; Weak pulses       Risk: 1

## 2019-11-12 ENCOUNTER — APPOINTMENT (OUTPATIENT)
Dept: LAB | Facility: CLINIC | Age: 84
End: 2019-11-12
Payer: MEDICARE

## 2019-11-12 ENCOUNTER — TRANSCRIBE ORDERS (OUTPATIENT)
Dept: LAB | Facility: CLINIC | Age: 84
End: 2019-11-12

## 2019-11-12 DIAGNOSIS — E78.5 HYPERLIPIDEMIA, UNSPECIFIED HYPERLIPIDEMIA TYPE: Primary | ICD-10-CM

## 2019-11-12 DIAGNOSIS — E78.5 HYPERLIPIDEMIA, UNSPECIFIED HYPERLIPIDEMIA TYPE: ICD-10-CM

## 2019-11-12 DIAGNOSIS — I10 ESSENTIAL HYPERTENSION, MALIGNANT: ICD-10-CM

## 2019-11-12 DIAGNOSIS — E11.9 DIABETES MELLITUS WITHOUT COMPLICATION (HCC): ICD-10-CM

## 2019-11-12 DIAGNOSIS — E11.9 DIABETES MELLITUS WITHOUT COMPLICATION (HCC): Primary | ICD-10-CM

## 2019-11-12 LAB
ALBUMIN SERPL BCP-MCNC: 3.2 G/DL (ref 3.5–5)
ALP SERPL-CCNC: 84 U/L (ref 46–116)
ALT SERPL W P-5'-P-CCNC: 11 U/L (ref 12–78)
ANION GAP SERPL CALCULATED.3IONS-SCNC: 10 MMOL/L (ref 4–13)
AST SERPL W P-5'-P-CCNC: 19 U/L (ref 5–45)
BASOPHILS # BLD AUTO: 0.07 THOUSANDS/ΜL (ref 0–0.1)
BASOPHILS NFR BLD AUTO: 1 % (ref 0–1)
BILIRUB SERPL-MCNC: 0.4 MG/DL (ref 0.2–1)
BUN SERPL-MCNC: 57 MG/DL (ref 5–25)
CALCIUM SERPL-MCNC: 9.2 MG/DL (ref 8.3–10.1)
CHLORIDE SERPL-SCNC: 100 MMOL/L (ref 100–108)
CHOLEST SERPL-MCNC: 122 MG/DL (ref 50–200)
CO2 SERPL-SCNC: 27 MMOL/L (ref 21–32)
CREAT SERPL-MCNC: 2.41 MG/DL (ref 0.6–1.3)
EOSINOPHIL # BLD AUTO: 0.39 THOUSAND/ΜL (ref 0–0.61)
EOSINOPHIL NFR BLD AUTO: 6 % (ref 0–6)
ERYTHROCYTE [DISTWIDTH] IN BLOOD BY AUTOMATED COUNT: 14.6 % (ref 11.6–15.1)
EST. AVERAGE GLUCOSE BLD GHB EST-MCNC: 189 MG/DL
GFR SERPL CREATININE-BSD FRML MDRD: 20 ML/MIN/1.73SQ M
GLUCOSE P FAST SERPL-MCNC: 198 MG/DL (ref 65–99)
HBA1C MFR BLD: 8.2 % (ref 4.2–6.3)
HCT VFR BLD AUTO: 43 % (ref 34.8–46.1)
HDLC SERPL-MCNC: 64 MG/DL
HGB BLD-MCNC: 13.9 G/DL (ref 11.5–15.4)
IMM GRANULOCYTES # BLD AUTO: 0.02 THOUSAND/UL (ref 0–0.2)
IMM GRANULOCYTES NFR BLD AUTO: 0 % (ref 0–2)
LDLC SERPL CALC-MCNC: 42 MG/DL (ref 0–100)
LYMPHOCYTES # BLD AUTO: 1.84 THOUSANDS/ΜL (ref 0.6–4.47)
LYMPHOCYTES NFR BLD AUTO: 29 % (ref 14–44)
MCH RBC QN AUTO: 27.5 PG (ref 26.8–34.3)
MCHC RBC AUTO-ENTMCNC: 32.3 G/DL (ref 31.4–37.4)
MCV RBC AUTO: 85 FL (ref 82–98)
MONOCYTES # BLD AUTO: 0.46 THOUSAND/ΜL (ref 0.17–1.22)
MONOCYTES NFR BLD AUTO: 7 % (ref 4–12)
NEUTROPHILS # BLD AUTO: 3.61 THOUSANDS/ΜL (ref 1.85–7.62)
NEUTS SEG NFR BLD AUTO: 57 % (ref 43–75)
NONHDLC SERPL-MCNC: 58 MG/DL
NRBC BLD AUTO-RTO: 0 /100 WBCS
PLATELET # BLD AUTO: 194 THOUSANDS/UL (ref 149–390)
PMV BLD AUTO: 10.1 FL (ref 8.9–12.7)
POTASSIUM SERPL-SCNC: 4.4 MMOL/L (ref 3.5–5.3)
PROT SERPL-MCNC: 7.8 G/DL (ref 6.4–8.2)
RBC # BLD AUTO: 5.05 MILLION/UL (ref 3.81–5.12)
SODIUM SERPL-SCNC: 137 MMOL/L (ref 136–145)
TRIGL SERPL-MCNC: 80 MG/DL
URATE SERPL-MCNC: 7.5 MG/DL (ref 2–6.8)
WBC # BLD AUTO: 6.39 THOUSAND/UL (ref 4.31–10.16)

## 2019-11-12 PROCEDURE — 84443 ASSAY THYROID STIM HORMONE: CPT

## 2019-11-12 PROCEDURE — 80053 COMPREHEN METABOLIC PANEL: CPT

## 2019-11-12 PROCEDURE — 83036 HEMOGLOBIN GLYCOSYLATED A1C: CPT

## 2019-11-12 PROCEDURE — 84550 ASSAY OF BLOOD/URIC ACID: CPT

## 2019-11-12 PROCEDURE — 85025 COMPLETE CBC W/AUTO DIFF WBC: CPT

## 2019-11-12 PROCEDURE — 36415 COLL VENOUS BLD VENIPUNCTURE: CPT

## 2019-11-12 PROCEDURE — 80061 LIPID PANEL: CPT

## 2019-11-14 LAB — MISCELLANEOUS LAB TEST RESULT: NORMAL

## 2019-12-05 ENCOUNTER — OFFICE VISIT (OUTPATIENT)
Dept: PULMONOLOGY | Facility: CLINIC | Age: 84
End: 2019-12-05
Payer: MEDICARE

## 2019-12-05 VITALS
WEIGHT: 145.8 LBS | HEART RATE: 103 BPM | OXYGEN SATURATION: 100 % | DIASTOLIC BLOOD PRESSURE: 60 MMHG | HEIGHT: 61 IN | SYSTOLIC BLOOD PRESSURE: 108 MMHG | TEMPERATURE: 98.5 F | BODY MASS INDEX: 27.53 KG/M2

## 2019-12-05 DIAGNOSIS — J44.9 COPD (CHRONIC OBSTRUCTIVE PULMONARY DISEASE) WITH CHRONIC BRONCHITIS (HCC): Primary | ICD-10-CM

## 2019-12-05 DIAGNOSIS — I10 ESSENTIAL HYPERTENSION: ICD-10-CM

## 2019-12-05 DIAGNOSIS — M54.16 LUMBAR RADICULOPATHY: ICD-10-CM

## 2019-12-05 PROCEDURE — 99213 OFFICE O/P EST LOW 20 MIN: CPT | Performed by: INTERNAL MEDICINE

## 2019-12-05 NOTE — ASSESSMENT & PLAN NOTE
Given Mrs Moreno's response to therapy, would continue her trilogy moving forward  She is continuing 1 puff once a day and using her nebulizer as needed  Given her response to therapy she no longer is needs to use her rescue nebulizer daily  She does complain of a cough mostly at night which is improved with the trilogy  I offered her a different nasal spray to help treat her postnasal drip and she declined at this time  She is up-to-date on her flu shot pneumonia shot  I reviewed her most recent x-rays  S see her back in 6 months

## 2019-12-05 NOTE — PROGRESS NOTES
Assessment/Plan:    COPD (chronic obstructive pulmonary disease) with chronic bronchitis ([facility])  Given Mrs Moreno's response to therapy, would continue her trilogy moving forward  She is continuing 1 puff once a day and using her nebulizer as needed  Given her response to therapy she no longer is needs to use her rescue nebulizer daily  She does complain of a cough mostly at night which is improved with the trilogy  I offered her a different nasal spray to help treat her postnasal drip and she declined at this time  She is up-to-date on her flu shot pneumonia shot  I reviewed her most recent x-rays  S see her back in 6 months  Diagnoses and all orders for this visit:    COPD (chronic obstructive pulmonary disease) with chronic bronchitis ([address] )    Essential hypertension    Lumbar radiculopathy          Subjective:      Patient ID: Qiana Pyle is a 80 y o  female  Mrs Bindu May is here for follow-up of her wheezing and cough  At the last visit we diagnosed her with mild COPD and put her on trilogy  Since taking the trilogy 1 puff daily she is had significant improvement in her wheezing and cough  The following portions of the patient's history were reviewed and updated as appropriate: allergies, current medications, past family history, past medical history, past social history, past surgical history and problem list     Review of Systems   Constitutional: Negative  Negative for unexpected weight change  HENT: Negative  Negative for postnasal drip  Eyes: Negative  Respiratory: Negative  Negative for cough, shortness of breath and wheezing  Cardiovascular: Negative  Negative for chest pain and leg swelling  Gastrointestinal: Negative  Endocrine: Negative  Genitourinary: Negative  Musculoskeletal: Negative  Allergic/Immunologic: Negative  Neurological: Negative  Hematological: Negative            Objective:      /60 (BP Location: Left arm, Patient Position: Sitting)   Pulse 103   Temp 98 5 °F (36 9 °C) (Tympanic)   Ht 5' 1" (1 549 m)   Wt 66 1 kg (145 lb 12 8 oz)   SpO2 100%   BMI 27 55 kg/m²          Physical Exam   Constitutional: She is oriented to person, place, and time  She appears well-developed and well-nourished  HENT:   Head: Normocephalic  Eyes: Pupils are equal, round, and reactive to light  Neck: Normal range of motion  Neck supple  Cardiovascular: Normal rate  No murmur heard  Pulmonary/Chest: Effort normal and breath sounds normal  No respiratory distress  She has no wheezes  She has no rales  Abdominal: Soft  Musculoskeletal: Normal range of motion  She exhibits no edema  Neurological: She is alert and oriented to person, place, and time  Skin: Skin is warm and dry

## 2020-02-06 ENCOUNTER — OFFICE VISIT (OUTPATIENT)
Dept: PODIATRY | Facility: CLINIC | Age: 85
End: 2020-02-06
Payer: MEDICARE

## 2020-02-06 VITALS
HEIGHT: 61 IN | WEIGHT: 153 LBS | BODY MASS INDEX: 28.89 KG/M2 | SYSTOLIC BLOOD PRESSURE: 163 MMHG | DIASTOLIC BLOOD PRESSURE: 71 MMHG | HEART RATE: 101 BPM

## 2020-02-06 DIAGNOSIS — L84 CORNS: ICD-10-CM

## 2020-02-06 DIAGNOSIS — E11.42 DIABETIC POLYNEUROPATHY ASSOCIATED WITH TYPE 2 DIABETES MELLITUS (HCC): Primary | ICD-10-CM

## 2020-02-06 PROCEDURE — 11719 TRIM NAIL(S) ANY NUMBER: CPT | Performed by: PODIATRIST

## 2020-02-06 PROCEDURE — 11055 PARING/CUTG B9 HYPRKER LES 1: CPT | Performed by: PODIATRIST

## 2020-02-06 RX ORDER — VALSARTAN AND HYDROCHLOROTHIAZIDE 160; 12.5 MG/1; MG/1
TABLET, FILM COATED ORAL
COMMUNITY
Start: 2020-01-10 | End: 2020-02-06 | Stop reason: SDUPTHER

## 2020-02-06 NOTE — PROGRESS NOTES
Established patient with class findings presents for nail and lesion care  Vascular exam:  DP  0/4 bilateral; PT  0 4 bilateral   Dermatological exam:  Each toenail is thick and  Dystrophic  Hyperkeratotic lesion right 5th toe  Diagnosis:  Diabetes mellitus; hyperkeratotic lesion right 5th toe  Treatment: Trimmed multiple dystrophic toenails  Trimmed hyperkeratotic lesion right 5th toe    Nail removal  Date/Time: 2/6/2020 5:11 PM  Performed by: Mago Jackson DPM  Authorized by: Mago Jackson DPM     Patient location:  Clinic  Anesthesia (see MAR for exact dosages): Anesthesia method:  None  Nails trimmed:     Number of nails trimmed:  10  Post-procedure details:     Patient tolerance of procedure:   Tolerated well, no immediate complications  Lesion Destruction  Date/Time: 2/6/2020 5:11 PM  Performed by: Mago Jackson DPM  Authorized by: Mago Jackson DPM     Procedure Details - Lesion Destruction:     Number of Lesions:  1  Lesion 1:     Body area:  Lower extremity    Lower extremity location:  R little toe    Malignancy: benign hyperkeratotic lesion      Destruction method: scissors used for extraction

## 2020-07-08 ENCOUNTER — OFFICE VISIT (OUTPATIENT)
Dept: PODIATRY | Facility: CLINIC | Age: 85
End: 2020-07-08
Payer: MEDICARE

## 2020-07-08 VITALS
HEART RATE: 98 BPM | HEIGHT: 61 IN | SYSTOLIC BLOOD PRESSURE: 130 MMHG | DIASTOLIC BLOOD PRESSURE: 73 MMHG | WEIGHT: 152 LBS | BODY MASS INDEX: 28.7 KG/M2 | TEMPERATURE: 97.9 F

## 2020-07-08 DIAGNOSIS — L84 CORNS: ICD-10-CM

## 2020-07-08 DIAGNOSIS — I73.9 PERIPHERAL VASCULAR DISEASE, UNSPECIFIED (HCC): Primary | ICD-10-CM

## 2020-07-08 PROCEDURE — 11719 TRIM NAIL(S) ANY NUMBER: CPT | Performed by: PODIATRIST

## 2020-07-08 PROCEDURE — 11055 PARING/CUTG B9 HYPRKER LES 1: CPT | Performed by: PODIATRIST

## 2020-07-08 NOTE — PROGRESS NOTES
Established patient with class findings presents for nail and lesion care  Vascular exam:  DP  0/4 bilateral; PT  0 4 bilateral   Dermatological exam:  Each toenail is thick and  Dystrophic  Hyperkeratotic lesion left 5th toe  Diagnosis:  Diabetes mellitus; PVD; hyperkeratotic lesions left 5th toe  Treatment: Trimmed multiple dystrophic toenails  Trimmed hyperkeratotic lesion left 5th toe    Nail removal  Date/Time: 7/8/2020 11:34 AM  Performed by: Antonio Perez DPM  Authorized by: Antonio Perez DPM     Patient location:  Clinic  Anesthesia (see MAR for exact dosages): Anesthesia method:  None  Nails trimmed:     Number of nails trimmed:  10  Post-procedure details:     Patient tolerance of procedure:   Tolerated well, no immediate complications  Lesion Destruction  Date/Time: 7/8/2020 11:35 AM  Performed by: Antonio Perez DPM  Authorized by: Antonio Perez DPM     Procedure Details - Lesion Destruction:     Number of Lesions:  2  Lesion 1:     Body area:  Lower extremity    Lower extremity location:  L little toe    Malignancy: benign hyperkeratotic lesion      Destruction method: scissors used for extraction    Lesion 2:     Body area:  Lower extremity    Lower extremity location:  R second toe    Malignancy: benign hyperkeratotic lesion      Destruction method: surgical removal

## 2020-07-21 ENCOUNTER — TRANSCRIBE ORDERS (OUTPATIENT)
Dept: LAB | Facility: CLINIC | Age: 85
End: 2020-07-21

## 2020-07-21 ENCOUNTER — APPOINTMENT (OUTPATIENT)
Dept: LAB | Facility: CLINIC | Age: 85
End: 2020-07-21
Payer: MEDICARE

## 2020-07-21 DIAGNOSIS — I10 ESSENTIAL HYPERTENSION, MALIGNANT: ICD-10-CM

## 2020-07-21 DIAGNOSIS — R73.01 IMPAIRED FASTING GLUCOSE: ICD-10-CM

## 2020-07-21 DIAGNOSIS — I51.9 MYXEDEMA HEART DISEASE: ICD-10-CM

## 2020-07-21 DIAGNOSIS — Z79.899 ENCOUNTER FOR LONG-TERM (CURRENT) USE OF OTHER MEDICATIONS: ICD-10-CM

## 2020-07-21 DIAGNOSIS — Z79.899 ENCOUNTER FOR LONG-TERM (CURRENT) USE OF OTHER MEDICATIONS: Primary | ICD-10-CM

## 2020-07-21 DIAGNOSIS — E03.9 MYXEDEMA HEART DISEASE: ICD-10-CM

## 2020-07-21 LAB
ALBUMIN SERPL BCP-MCNC: 3.4 G/DL (ref 3.5–5)
ALP SERPL-CCNC: 75 U/L (ref 46–116)
ALT SERPL W P-5'-P-CCNC: 17 U/L (ref 12–78)
ANION GAP SERPL CALCULATED.3IONS-SCNC: 10 MMOL/L (ref 4–13)
AST SERPL W P-5'-P-CCNC: 18 U/L (ref 5–45)
BASOPHILS # BLD AUTO: 0.04 THOUSANDS/ΜL (ref 0–0.1)
BASOPHILS NFR BLD AUTO: 1 % (ref 0–1)
BILIRUB SERPL-MCNC: 0.33 MG/DL (ref 0.2–1)
BUN SERPL-MCNC: 58 MG/DL (ref 5–25)
CALCIUM SERPL-MCNC: 8.9 MG/DL (ref 8.3–10.1)
CHLORIDE SERPL-SCNC: 102 MMOL/L (ref 100–108)
CHOLEST SERPL-MCNC: 133 MG/DL (ref 50–200)
CO2 SERPL-SCNC: 27 MMOL/L (ref 21–32)
CREAT SERPL-MCNC: 2.35 MG/DL (ref 0.6–1.3)
EOSINOPHIL # BLD AUTO: 0.32 THOUSAND/ΜL (ref 0–0.61)
EOSINOPHIL NFR BLD AUTO: 5 % (ref 0–6)
ERYTHROCYTE [DISTWIDTH] IN BLOOD BY AUTOMATED COUNT: 13.6 % (ref 11.6–15.1)
EST. AVERAGE GLUCOSE BLD GHB EST-MCNC: 166 MG/DL
GFR SERPL CREATININE-BSD FRML MDRD: 20 ML/MIN/1.73SQ M
GLUCOSE P FAST SERPL-MCNC: 128 MG/DL (ref 65–99)
HBA1C MFR BLD: 7.4 %
HCT VFR BLD AUTO: 42.2 % (ref 34.8–46.1)
HDLC SERPL-MCNC: 57 MG/DL
HGB BLD-MCNC: 13.9 G/DL (ref 11.5–15.4)
IMM GRANULOCYTES # BLD AUTO: 0.03 THOUSAND/UL (ref 0–0.2)
IMM GRANULOCYTES NFR BLD AUTO: 0 % (ref 0–2)
LDLC SERPL CALC-MCNC: 55 MG/DL (ref 0–100)
LYMPHOCYTES # BLD AUTO: 1.61 THOUSANDS/ΜL (ref 0.6–4.47)
LYMPHOCYTES NFR BLD AUTO: 24 % (ref 14–44)
MCH RBC QN AUTO: 28.9 PG (ref 26.8–34.3)
MCHC RBC AUTO-ENTMCNC: 32.9 G/DL (ref 31.4–37.4)
MCV RBC AUTO: 88 FL (ref 82–98)
MONOCYTES # BLD AUTO: 0.48 THOUSAND/ΜL (ref 0.17–1.22)
MONOCYTES NFR BLD AUTO: 7 % (ref 4–12)
NEUTROPHILS # BLD AUTO: 4.2 THOUSANDS/ΜL (ref 1.85–7.62)
NEUTS SEG NFR BLD AUTO: 63 % (ref 43–75)
NONHDLC SERPL-MCNC: 76 MG/DL
NRBC BLD AUTO-RTO: 0 /100 WBCS
PLATELET # BLD AUTO: 189 THOUSANDS/UL (ref 149–390)
PMV BLD AUTO: 10.2 FL (ref 8.9–12.7)
POTASSIUM SERPL-SCNC: 4.1 MMOL/L (ref 3.5–5.3)
PROT SERPL-MCNC: 8 G/DL (ref 6.4–8.2)
RBC # BLD AUTO: 4.81 MILLION/UL (ref 3.81–5.12)
SODIUM SERPL-SCNC: 139 MMOL/L (ref 136–145)
TRIGL SERPL-MCNC: 107 MG/DL
URATE SERPL-MCNC: 8.3 MG/DL (ref 2–6.8)
WBC # BLD AUTO: 6.68 THOUSAND/UL (ref 4.31–10.16)

## 2020-07-21 PROCEDURE — 80061 LIPID PANEL: CPT

## 2020-07-21 PROCEDURE — 85025 COMPLETE CBC W/AUTO DIFF WBC: CPT

## 2020-07-21 PROCEDURE — 84550 ASSAY OF BLOOD/URIC ACID: CPT

## 2020-07-21 PROCEDURE — 84443 ASSAY THYROID STIM HORMONE: CPT

## 2020-07-21 PROCEDURE — 36415 COLL VENOUS BLD VENIPUNCTURE: CPT

## 2020-07-21 PROCEDURE — 80053 COMPREHEN METABOLIC PANEL: CPT

## 2020-07-21 PROCEDURE — 83036 HEMOGLOBIN GLYCOSYLATED A1C: CPT

## 2020-07-27 LAB — MISCELLANEOUS LAB TEST RESULT: NORMAL

## 2020-08-25 ENCOUNTER — TRANSCRIBE ORDERS (OUTPATIENT)
Dept: ADMINISTRATIVE | Facility: HOSPITAL | Age: 85
End: 2020-08-25

## 2020-08-25 DIAGNOSIS — I65.23 BILATERAL CAROTID ARTERY STENOSIS: Primary | ICD-10-CM

## 2020-09-18 ENCOUNTER — HOSPITAL ENCOUNTER (OUTPATIENT)
Dept: NON INVASIVE DIAGNOSTICS | Facility: HOSPITAL | Age: 85
Discharge: HOME | End: 2020-09-18
Payer: MEDICARE

## 2020-09-18 DIAGNOSIS — I65.23 BILATERAL CAROTID ARTERY STENOSIS: ICD-10-CM

## 2020-09-18 PROCEDURE — 93880 EXTRACRANIAL BILAT STUDY: CPT | Performed by: SURGERY

## 2020-09-18 PROCEDURE — 93880 EXTRACRANIAL BILAT STUDY: CPT

## 2020-10-08 ENCOUNTER — OFFICE VISIT (OUTPATIENT)
Dept: PODIATRY | Facility: CLINIC | Age: 85
End: 2020-10-08
Payer: MEDICARE

## 2020-10-08 VITALS
SYSTOLIC BLOOD PRESSURE: 140 MMHG | TEMPERATURE: 98.4 F | HEIGHT: 61 IN | WEIGHT: 150 LBS | HEART RATE: 86 BPM | BODY MASS INDEX: 28.32 KG/M2 | DIASTOLIC BLOOD PRESSURE: 85 MMHG

## 2020-10-08 DIAGNOSIS — L84 CORNS: ICD-10-CM

## 2020-10-08 DIAGNOSIS — I73.9 PERIPHERAL VASCULAR DISEASE, UNSPECIFIED (HCC): Primary | ICD-10-CM

## 2020-10-08 PROCEDURE — RECHECK: Performed by: PODIATRIST

## 2020-10-08 PROCEDURE — 11719 TRIM NAIL(S) ANY NUMBER: CPT | Performed by: PODIATRIST

## 2020-10-08 PROCEDURE — 11056 PARNG/CUTG B9 HYPRKR LES 2-4: CPT | Performed by: PODIATRIST

## 2021-01-18 ENCOUNTER — TELEPHONE (OUTPATIENT)
Dept: PULMONOLOGY | Facility: CLINIC | Age: 86
End: 2021-01-18

## 2021-01-18 DIAGNOSIS — J44.9 COPD (CHRONIC OBSTRUCTIVE PULMONARY DISEASE) WITH CHRONIC BRONCHITIS (HCC): ICD-10-CM

## 2021-01-18 DIAGNOSIS — J44.9 COPD (CHRONIC OBSTRUCTIVE PULMONARY DISEASE) WITH CHRONIC BRONCHITIS (HCC): Primary | ICD-10-CM

## 2021-01-18 PROCEDURE — U0003 INFECTIOUS AGENT DETECTION BY NUCLEIC ACID (DNA OR RNA); SEVERE ACUTE RESPIRATORY SYNDROME CORONAVIRUS 2 (SARS-COV-2) (CORONAVIRUS DISEASE [COVID-19]), AMPLIFIED PROBE TECHNIQUE, MAKING USE OF HIGH THROUGHPUT TECHNOLOGIES AS DESCRIBED BY CMS-2020-01-R: HCPCS | Performed by: INTERNAL MEDICINE

## 2021-01-18 PROCEDURE — U0005 INFEC AGEN DETEC AMPLI PROBE: HCPCS | Performed by: INTERNAL MEDICINE

## 2021-01-18 RX ORDER — CEFUROXIME AXETIL 500 MG/1
500 TABLET ORAL EVERY 12 HOURS SCHEDULED
Qty: 10 TABLET | Refills: 0 | Status: SHIPPED | OUTPATIENT
Start: 2021-01-18 | End: 2021-01-23

## 2021-01-18 NOTE — TELEPHONE ENCOUNTER
Daughter Vikash Espitia calling stating patient  For the past two days  has been coughing,bring up white mucus,low grade fever,patient hasn't been eating for the past week   Please advise  Vikash Espitia [phone]

## 2021-01-19 LAB — SARS-COV-2 N GENE RESP QL NAA+PROBE: POSITIVE

## 2021-01-20 ENCOUNTER — TELEPHONE (OUTPATIENT)
Dept: PULMONOLOGY | Facility: CLINIC | Age: 86
End: 2021-01-20

## 2021-01-20 DIAGNOSIS — Z23 ENCOUNTER FOR IMMUNIZATION: ICD-10-CM

## 2021-01-21 ENCOUNTER — TELEMEDICINE (OUTPATIENT)
Dept: INTERNAL MEDICINE CLINIC | Facility: CLINIC | Age: 86
End: 2021-01-21
Payer: MEDICARE

## 2021-01-21 DIAGNOSIS — U07.1 COVID-19 VIRUS INFECTION: Primary | ICD-10-CM

## 2021-01-21 PROCEDURE — 99203 OFFICE O/P NEW LOW 30 MIN: CPT | Performed by: INTERNAL MEDICINE

## 2021-01-21 NOTE — PROGRESS NOTES
COVID-19 Virtual Visit     Assessment/Plan:    Problem List Items Addressed This Visit     COVID-19 virus infection - Primary         Disposition:     I recommended continued isolation until at least 24 hours have passed since recovery defined as resolution of fever without the use of fever-reducing medications AND improvement in COVID symptoms AND 10 days have passed since onset of symptoms (or 10 days have passed since date of first positive viral diagnostic test for asymptomatic patients)  Extensive discussion with daughter regarding bamlanivimab infusion therapy including the risk/benefits of this therapy and possible side effects  Advised that not all side effects are known at this time but that Ryan Matias will be at [facility] infusion center and be monitored during and after the treatment  Detailed information sent to patient via CrossReader for daughter and patient's review  Advised on limited time window for Ryan Matias to receive treatment(7 days from onset of symptoms) for benefit  Daughter is hesitant due to concern regarding possible side effects of infusion and that her mother is doing and feeling better today  Advised that infusion cannot be given while in-patient and that Ryan Matias will have to wait 90 days after infusion(& 90 days after COVID-19 infection) before receiving COVID-19 vaccine  All questions answered/addressed & daugther(Anette) will call back if she changes her mind, before end of day tomorrow(Friday)  Addendum(1/22/2021):  Patient's daughter called back and agrees to proceed with monoclonal ab infusion  I called daughter(Anette) and spoke with her   bamlanivimab is no longer administered but casirivimab is in place(regeneron) product and has similar possible side effects, non-FDA approved and EUA only as bamlamivimab  Juany Richardson is aware and agrees to proceed with treatment  Patient meets criteria for Casirivimab/Imdevimab infusion   They were counseled in regards to risks, benefits, and side effects of this infusion  Casirivimab and imdevimab are investigational medicines used to treat mild to moderate symptoms of COVID-19 in non-hospitalized adults and adolescents (15years of age and older who weigh at least 80 pounds (40 kg)), and who are at high risk for developing severe COVID-19 symptoms or the need for hospitalization  Casirivimab and imdevimab are investigational because they are still being studied  There is limited information known about the safety and effectiveness of using casirivimab and imdevimab to treat people with COVID-19  The FDA has authorized the emergency use of casirivimab and imdevimab for the treatment of COVID-19 under an Emergency Use Authorization (EUA)  Possible side effects of casirivimab and imdevimab: Allergic reactions can happen during and after infusion with casirivimab and imdevimab  Possible reactions include: fever, chills, nausea, headache, shortness of breath, low blood pressure, wheezing, swelling of your lips, face, or throat, rash including hives, itching, muscle aches, and dizziness  The side effects of getting any medicine by vein may include brief pain, bleeding, bruising of the skin, soreness, swelling, and possible infection at the infusion site  These are not all the possible side effects of casirivimab and imdevimab  Not a lot of people have been given casirivimab and imdevimab  Serious and unexpected side effects may happen  Casirivimab and imdevimab are still being studied so it is possible that all of the risks are not known at this time  It is possible that casirivimab and imdevimab could interfere with your body's own ability to fight off a future infection of SARS-CoV-2  Similarly, casirivimab and imdevimab may reduce your body's immune response to a vaccine for SARS-CoV-2  Specific studies have not been conducted to address these possible risks       Emergency Use Authorization:    The [place] FDA has made casirivimab and imdevimab available under an emergency access mechanism called an EUA  The EUA is supported by a  of Health and Human Service (HHS) declaration that circumstances exist to justify the emergency use of drugs and biological products during the COVID-19 pandemic  Casirivimab and imdevimab have not undergone the same type of review as an FDA-approved or cleared product  The FDA may issue an EUA when certain criteria are met, which includes that there are no adequate, approved, available alternatives  In addition, the FDA decision is based on the totality of scientific evidence available showing that it is reasonable to believe that the product meets certain criteria for safety, performance, and labeling and may be effective in treatment of patients during the COVID-19 pandemic  All of these criteria must be met to allow for the product to be used in the treatment of patients during the COVID-19 pandemic  The EUA for casirivimab and imdevimab is in effect for the duration of the COVID-19 declaration justifying emergency use of these products, unless terminated or revoked (after which the products may no longer be used)  Regarding COVID-19 Vaccination:    Currently there is no data or safety or efficacy of COVID-19 vaccination in persons who received monoclonal antibodies as part of COVID-19 treatment  Treatment should be deferred for at least 90 days to avoid interference of the treatment with vaccine-induced immune responses (this is based on estimated half-life of therapies and evidence suggesting reinfection is uncommon within 90 days of initial infection)  The patient consents to proceed with casirivimab and imdevimab infusion  I have spent 35 minutes directly with the patient   Greater than 50% of this time was spent in counseling/coordination of care regarding: diagnostic results, prognosis, risks and benefits of treatment options, instructions for management, patient and family education and impressions  Encounter provider Zuleika Elliott DO    Provider located at [address] [place] [address]  [address]  [phone]    Recent Visits  Date Type Provider Dept   01/21/21 Telemedicine Zuleikaancelmo Elliott, [facility]   Showing recent visits within past 7 days and meeting all other requirements     Today's Visits  Date Type Provider Dept   01/22/21 Telephone [address] [facility]   Showing today's visits and meeting all other requirements     Future Appointments  No visits were found meeting these conditions  Showing future appointments within next 150 days and meeting all other requirements      This virtual check-in was done via Microsoft Teams and patient was informed that this is a secure, HIPAA-compliant platform  She agrees to proceed  Patient agrees to participate in a virtual check in via telephone or video visit instead of presenting to the office to address urgent/immediate medical needs  Patient is aware this is a billable service  After connecting through [facility], the patient was identified by name and date of birth  Loyda Erickson was informed that this was a telemedicine visit and that the exam was being conducted confidentially over secure lines  My office door was closed  No one else was in the room  Loyda Erickson acknowledged consent and understanding of privacy and security of the telemedicine visit  I informed the patient that I have reviewed her record in Epic and presented the opportunity for her to ask any questions regarding the visit today  The patient agreed to participate  Subjective:   Loyda Erickson is a 80 y o  female who has been screened for COVID-19  Symptom change since last report: improving  Patient's symptoms include fever, chills, malaise and cough   Patient denies shortness of breath and chest tightness  Wilma Parekh has been staying home and has isolated themselves in her home  She is taking care to not share personal items and is cleaning all surfaces that are touched often, like counters, tabletops, and doorknobs using household cleaning sprays or wipes  She is wearing a mask when she leaves her room  Date of symptom onset: 1/16/2021  Date of positive COVID-19 PCR: 1/18/2021    New to me, sees a PCP in [facility] but in [place] with her daughter and tested positive for COVID-19 infection on 1/18/21  Majority of history provided by daughter(Anette)  She started to feel unwell over last weekend on 1/16/21 with fatigue and slight cough  She has no SOB or CP  She had low grade fever and was tested for COVID-19 which resulted as positive on 1/18/21  [facility] ordered ceftin antibiotic for treatment and suggested a discussion with her PCP about monoclonal ab infusion therapy  PCP in [facility] advised patient to contact provider locally to address/advise  Patient has CKD, COPD and diabetes/HTN per chart and discussion with daughter  Daughter is giving patient nebulizer treatments and she uses trelegy for COPD  Her blood sugars are stable (90 was lowest) without insulin at this time  We discussed bamlanivimab infusion therapy and that it is not FDA approved but under Emergency Use Authorization  Daughter had several questions about this infusion and stated that patient is doing better today and wants to receive more information before scheduling the patient for this treatment  I advised on the limited 7 day window of therapy based on the EUA and network guidelines & to notify me right away if they would like to schedule this treatment  ROS Otherwise negative, no other complaints      Lab Results   Component Value Date    SARSCOV2 Positive (A) 01/18/2021     Past Medical History:   Diagnosis Date    Arthritis     Asthma     [facility] [address] [facility]     [facility] [address] [facility]     Hyperlipidemia     Hypertension     Hypothyroidism     Overactive bladder     PVD [facility]peripheral vascular disease) [facility])      Past Surgical History:   Procedure Laterality Date    CAROTID ENDARTERECTOMY Right     CATARACT EXTRACTION      HYSTERECTOMY      NEPHRECTOMY      in her 25s     Current Outpatient Medications   Medication Sig Dispense Refill    aspirin (ECOTRIN LOW STRENGTH) 81 mg EC tablet Take 81 mg by mouth daily      atorvastatin (LIPITOR) 40 mg tablet Take 40 mg by mouth daily      cefuroxime (CEFTIN) 500 mg tablet Take 1 tablet (500 mg total) by mouth every 12 (twelve) hours for 5 days 10 tablet 0    insulin glargine (BASAGLAR KWIKPEN) 100 units/mL injection pen Inject 20 Units under the skin daily      JARDIANCE 10 MG TABS Take 10 mg by mouth daily  0    levothyroxine 100 mcg tablet Take 100 mcg by mouth daily      oxybutynin (DITROPAN-XL) 10 MG 24 hr tablet Take 10 mg by mouth daily  0    valsartan (DIOVAN) 160 mg tablet Take 160 mg by mouth daily      acetaminophen (TYLENOL) 325 mg tablet Take 2 tablets (650 mg total) by mouth every 6 (six) hours as needed for mild pain 30 tablet 0    albuterol (PROVENTIL HFA,VENTOLIN HFA) 90 mcg/act inhaler Inhale 2 puffs every 4 (four) hours as needed  0    BD PEN NEEDLE SKYLER U/F 32G X 4 MM MISC 1 SYRINGE BY MISCELLANEOUS ROUTE DAILY  0    Calcium Carb-Cholecalciferol (CALTRATE 600+D3 SOFT PO) Take 2 tablets by mouth      clobetasol (TEMOVATE) 0 05 % ointment APPLY TOPICALLY 2 (TWO) TIMES A DAY APPLY NIGHTLY FOR 1 WEEK, THEN EVERY OTHER NIGHT      COLCRYS 0 6 MG tablet Take 0 6 mg by mouth 2 (two) times a day  0    docusate sodium (COLACE) 100 mg capsule Take 100 mg by mouth 2 (two) times a day      Dulaglutide (TRULICITY) 1 5 KD/0 3EC SOPN Inject 1 5 mg under the skin once a week      fluconazole (DIFLUCAN) 150 mg tablet TAKE 1 TABLET BY MOUTH AS ONE DOSE  0    fluticasone-umeclidinium-vilanterol (TRELEGY) 100-62 5-25 MCG/INH inhaler Inhale 1 puff daily Rinse mouth after use  1 Inhaler 5    ONE TOUCH ULTRA TEST test strip USE FOR BLOOD GLUCOSE TESTING AS DIRECTED TWICE DAILY  1    ONETOUCH DELICA LANCETS 94R MISC 1 SYRINGE BY MISCELLANEOUS ROUTE 2 TIMES A DAY AS NEEDED DX E11 9 LAST O/V 10/20/18  1    triamcinolone (KENALOG) 0 1 % ointment Apply 1 application topically 2 (two) times a day To affected area  1     No current facility-administered medications for this visit  Allergies   Allergen Reactions    Ace Inhibitors      Other reaction(s): cough    Metformin      Other reaction(s): severe constipation    Penicillins Hives    Sulfa Antibiotics      Unknown reaction       Review of Systems   Constitutional: Positive for chills and fever  Respiratory: Positive for cough  Negative for chest tightness and shortness of breath  Allergic/Immunologic: Negative for immunocompromised state  Objective: There were no vitals filed for this visit  Physical Exam  Constitutional:       General: She is not in acute distress  Appearance: Normal appearance  Comments: Sitting on couch, near daughter(Anette) who provides majority of history and interaction during video visit   Pulmonary:      Effort: Pulmonary effort is normal  No respiratory distress  Neurological:      Mental Status: She is alert  VIRTUAL VISIT DISCLAIMER    Ana Muhammad acknowledges that she has consented to an online visit or consultation  She understands that the online visit is based solely on information provided by her, and that, in the absence of a face-to-face physical evaluation by the physician, the diagnosis she receives is both limited and provisional in terms of accuracy and completeness  This is not intended to replace a full medical face-to-face evaluation by the physician  Ana Muhammad understands and accepts these terms

## 2021-01-22 ENCOUNTER — TELEPHONE (OUTPATIENT)
Dept: INTERNAL MEDICINE CLINIC | Facility: CLINIC | Age: 86
End: 2021-01-22

## 2021-01-22 PROBLEM — U07.1 COVID-19 VIRUS INFECTION: Status: ACTIVE | Noted: 2021-01-22

## 2021-01-22 RX ORDER — ALBUTEROL SULFATE 90 UG/1
3 AEROSOL, METERED RESPIRATORY (INHALATION) ONCE AS NEEDED
Status: CANCELLED | OUTPATIENT
Start: 2021-01-23

## 2021-01-22 RX ORDER — ACETAMINOPHEN 325 MG/1
650 TABLET ORAL ONCE AS NEEDED
Status: CANCELLED | OUTPATIENT
Start: 2021-01-23

## 2021-01-22 RX ORDER — SODIUM CHLORIDE 9 MG/ML
20 INJECTION, SOLUTION INTRAVENOUS ONCE
Status: CANCELLED | OUTPATIENT
Start: 2021-01-23

## 2021-01-22 NOTE — TELEPHONE ENCOUNTER
[facility] msg sent with details/Fact sheet info for casirivimab/imdevimab infusion Wilma Parekh is scheduled for tmrw

## 2021-01-22 NOTE — TELEPHONE ENCOUNTER
Called and spoke to daughter(Anette)    She would like to proceed with monoclonal ab infusion    Checked with infusion center, only Frenchferoz Balderase is being administered at this time, not bamlanivimab    This was d/w daughter and advised similar possible AE and non-FDA approval as bamlanivimab and daughter would like to proceed with scheduling and infusion    Will email daughter info on casirivimab infusion

## 2021-01-22 NOTE — TELEPHONE ENCOUNTER
It's quite uncommon to have false positive for the PCR swab test and given her symptoms & when they started, I think it's a true positive    The monoclonal antibody infusion has possible side effects   however, I've had some folks receive the infusion and do okay with it    The decision is ultimately up to Linsey Merlos and Eloy  Did Eloy speak with Amanda Bullock PCP about the infusion to get their take on the treatment(since they know Linsey Merlos better)?     If they have additional questions, I can call them later today/around lunch time    [facility]

## 2021-01-22 NOTE — TELEPHONE ENCOUNTER
PT'S DAUGHTER CALLED BACK ABOUT SCHEDULING THE COVID INFUSION BUT IS WONDERING IF MOM'S POSITIVE ISN'T A FALSE POSITIVE SINCE MOM HASN'T BEEN ANYWHERE AND NO ONE ELSE IN THE HOUSEHOLD HAS IT    EDILBERTO IS WONDERING IF THIS INFUSION WILL BENEFIT MOM ESPECIALLY SINCE SHE'S WONDERING IF HER POSITIVE IS AN ACTUAL POSITIVE     PLEASE ADVISE

## 2021-01-22 NOTE — TELEPHONE ENCOUNTER
SPOKE WITH PT'S DAUGHTER AGAIN, GAVE MESSAGE  SHE SAID THAT HER MOTHER'S PCP DOESN'T KNOW ANYTHING ABOUT THIS INFUSION SO SHE ISN'T GOING TO CALL THEM   SHE ALSO SAID THAT BETWEEN TALKING TO YOU AND READING THE INFORMATION YOU GAVE, SHE FEELS IT'S A GOOD IDEA TO HAVE IT DONE, WAS JUST CURIOUS ABOUT THE POSSIBILITY OF IT BEING A FALSE POSITIVE    SHE WOULD LIKE FOR YOU TO ORDER THE INFUSION SO WE CAN GET MOM ON THE SCHEDULE

## 2021-01-23 ENCOUNTER — HOSPITAL ENCOUNTER (OUTPATIENT)
Dept: INFUSION CENTER | Facility: HOSPITAL | Age: 86
Discharge: HOME | End: 2021-01-23
Payer: MEDICARE

## 2021-01-23 VITALS
TEMPERATURE: 98.1 F | RESPIRATION RATE: 18 BRPM | HEART RATE: 93 BPM | DIASTOLIC BLOOD PRESSURE: 88 MMHG | SYSTOLIC BLOOD PRESSURE: 147 MMHG | OXYGEN SATURATION: 96 %

## 2021-01-23 DIAGNOSIS — U07.1 COVID-19 VIRUS INFECTION: Primary | ICD-10-CM

## 2021-01-23 PROCEDURE — M0243 CASIRIVI AND IMDEVI INFUSION: HCPCS

## 2021-01-23 RX ORDER — ACETAMINOPHEN 325 MG/1
650 TABLET ORAL ONCE AS NEEDED
Status: DISCONTINUED | OUTPATIENT
Start: 2021-01-23 | End: 2021-01-26 | Stop reason: HOSPADM

## 2021-01-23 RX ORDER — SODIUM CHLORIDE 9 MG/ML
20 INJECTION, SOLUTION INTRAVENOUS ONCE
Status: CANCELLED | OUTPATIENT
Start: 2021-01-23

## 2021-01-23 RX ORDER — ACETAMINOPHEN 325 MG/1
650 TABLET ORAL ONCE AS NEEDED
Status: CANCELLED | OUTPATIENT
Start: 2021-01-23

## 2021-01-23 RX ORDER — ALBUTEROL SULFATE 90 UG/1
3 AEROSOL, METERED RESPIRATORY (INHALATION) ONCE AS NEEDED
Status: CANCELLED | OUTPATIENT
Start: 2021-01-23

## 2021-01-23 RX ORDER — SODIUM CHLORIDE 9 MG/ML
20 INJECTION, SOLUTION INTRAVENOUS ONCE
Status: COMPLETED | OUTPATIENT
Start: 2021-01-23 | End: 2021-01-23

## 2021-01-23 RX ORDER — ALBUTEROL SULFATE 90 UG/1
3 AEROSOL, METERED RESPIRATORY (INHALATION) ONCE AS NEEDED
Status: DISCONTINUED | OUTPATIENT
Start: 2021-01-23 | End: 2021-01-26 | Stop reason: HOSPADM

## 2021-01-23 RX ADMIN — SODIUM CHLORIDE 20 ML/HR: 9 INJECTION, SOLUTION INTRAVENOUS at 12:39

## 2021-01-23 RX ADMIN — IMDEVIMAB: 300 INJECTION, SOLUTION, CONCENTRATE INTRAVENOUS at 12:45

## 2021-01-23 NOTE — PROGRESS NOTES
Patient is here today for their Regeneron (Carsirivimab and Imdevimab) infusion  Reviewed EUA with patient  Patient verbalized understanding of EUA  Copy of EUA given to patient  All questions answered to patients satisfaction  Pt provided verbal consent for treatment

## 2021-01-23 NOTE — PROGRESS NOTES
Patient tolerated Regeneron (Carsirivimab and Imdevimab) infusion and 1 hour post observation without incident  Discharge instructions reviewed with patient and patients daughter  Copy of discharge instructions given provded  Patient and patients daughter made aware to follow up with physician after today's treatment  Patient discharged without incident

## 2021-01-24 ENCOUNTER — TELEMEDICINE (OUTPATIENT)
Dept: INTERNAL MEDICINE CLINIC | Facility: CLINIC | Age: 86
End: 2021-01-24
Payer: MEDICARE

## 2021-01-24 DIAGNOSIS — U07.1 COVID-19 VIRUS INFECTION: Primary | ICD-10-CM

## 2021-01-24 PROCEDURE — 99441 PR PHYS/QHP TELEPHONE EVALUATION 5-10 MIN: CPT | Performed by: INTERNAL MEDICINE

## 2021-01-24 NOTE — PROGRESS NOTES
COVID-19 Virtual Visit     Assessment/Plan:    Problem List Items Addressed This Visit     COVID-19 virus infection - Primary         Disposition:     I recommended continued isolation until at least 24 hours have passed since recovery defined as resolution of fever without the use of fever-reducing medications AND improvement in COVID symptoms AND 10 days have passed since onset of symptoms (or 10 days have passed since date of first positive viral diagnostic test for asymptomatic patients)  Rony Riddle is at home with her daughter  She is doing better since last week and is clinically stable  She is not having fever, diarrhea, SOB, cough or other concerns  She rec'd casirivimab/imdevimab infusion yesterday and has been doing well since  Daughter and rest of family are on quarntine at home per their respective primary care providers  I have spent 7 minutes directly with the patient  Greater than 50% of this time was spent in counseling/coordination of care regarding: instructions for management, patient and family education and impressions  Encounter provider Ade Mattson DO    Provider located at [address] [place] [address]  [address]ia [address]  [phone]    Recent Visits  Date Type Provider Dept   01/22/21 Telephone [address] Internal Med   01/21/21 Telemedicine [facility]   Showing recent visits within past 7 days and meeting all other requirements     Today's Visits  Date Type Provider Dept   01/24/21 Telemedicine [facility]   Showing today's visits and meeting all other requirements     Future Appointments  No visits were found meeting these conditions     Showing future appointments within next 150 days and meeting all other requirements        Patient agrees to participate in a virtual check in via telephone or video visit instead of presenting to the office to address urgent/immediate medical needs  Patient is aware this is a billable service  After connecting through Telephone, the patient was identified by name and date of birth  Jada Norwood was informed that this was a telemedicine visit and that the exam was being conducted confidentially over secure lines  My office door was closed  No one else was in the room  Jada Norwood acknowledged consent and understanding of privacy and security of the telemedicine visit  I informed the patient that I have reviewed her record in Epic and presented the opportunity for her to ask any questions regarding the visit today  The patient agreed to participate  Subjective:   Jada Norwood is a 80 y o  female who has been screened for COVID-19  Symptom change since last report: improving  Patient's symptoms include fatigue and malaise  Patient denies fever, cough, shortness of breath and diarrhea  Rony Riddle has been staying home and has isolated themselves in her home  She is taking care to not share personal items and is cleaning all surfaces that are touched often, like counters, tabletops, and doorknobs using household cleaning sprays or wipes  She is wearing a mask when she leaves her room  Date of symptom onset: 1/16/2021  Date of positive COVID-19 PCR: 1/18/2021    Monoclonal Antibody Follow-up Symptom Questionnaire  I feel overall: somewhat better  My breathing is: somewhat better  My fever is: better  My fatigue is: similar    Rony Riddle rec'd casirivimab/imdevimab infusion yesterday and is doing overall better since last week with COVID-19 infection  She is recovering at home  All information per daughter  Rony Riddle is not having SOB, fever, cough, diarrhea and had breakfast this morning  She completed her antibiotic course  She has no concerns since receiving monoclonal antibody infusion yesterday and no concerns in general otherwise    ROS otherwise negative, no other complaints  Lab Results   Component Value Date    SARSCOV2 Positive (A) 01/18/2021     Past Medical History:   Diagnosis Date    Arthritis     Asthma     [facility]     Diabetes mellitus ([facility]     Hyperlipidemia     Hypertension     Hypothyroidism     Overactive bladder     PVD (peripheral vascular disease) ([facility] )      Past Surgical History:   Procedure Laterality Date    CAROTID ENDARTERECTOMY Right     CATARACT EXTRACTION      HYSTERECTOMY      NEPHRECTOMY      in her 25s     Current Outpatient Medications   Medication Sig Dispense Refill    acetaminophen (TYLENOL) 325 mg tablet Take 2 tablets (650 mg total) by mouth every 6 (six) hours as needed for mild pain 30 tablet 0    albuterol (PROVENTIL HFA,VENTOLIN HFA) 90 mcg/act inhaler Inhale 2 puffs every 4 (four) hours as needed  0    aspirin (ECOTRIN LOW STRENGTH) 81 mg EC tablet Take 81 mg by mouth daily      atorvastatin (LIPITOR) 40 mg tablet Take 40 mg by mouth daily      BD PEN NEEDLE SKYLER U/F 32G X 4 MM MISC 1 SYRINGE BY MISCELLANEOUS ROUTE DAILY  0    Calcium Carb-Cholecalciferol (CALTRATE 600+D3 SOFT PO) Take 2 tablets by mouth      clobetasol (TEMOVATE) 0 05 % ointment APPLY TOPICALLY 2 (TWO) TIMES A DAY APPLY NIGHTLY FOR 1 WEEK, THEN EVERY OTHER NIGHT      COLCRYS 0 6 MG tablet Take 0 6 mg by mouth 2 (two) times a day  0    docusate sodium (COLACE) 100 mg capsule Take 100 mg by mouth 2 (two) times a day      Dulaglutide (TRULICITY) 1 5 KB/9 2DW SOPN Inject 1 5 mg under the skin once a week      fluconazole (DIFLUCAN) 150 mg tablet TAKE 1 TABLET BY MOUTH AS ONE DOSE  0    fluticasone-umeclidinium-vilanterol (TRELEGY) 100-62 5-25 MCG/INH inhaler Inhale 1 puff daily Rinse mouth after use   1 Inhaler 5    insulin glargine (BASAGLAR KWIKPEN) 100 units/mL injection pen Inject 20 Units under the skin daily      JARDIANCE 10 MG TABS Take 10 mg by mouth daily  0    levothyroxine 100 mcg tablet Take 100 mcg by mouth daily      ONE TOUCH ULTRA TEST test strip USE FOR BLOOD GLUCOSE TESTING AS DIRECTED TWICE DAILY  1    ONETOUCH DELICA LANCETS 17P MISC 1 SYRINGE BY MISCELLANEOUS ROUTE 2 TIMES A DAY AS NEEDED DX E11 9 LAST O/V 10/20/18  1    oxybutynin (DITROPAN-XL) 10 MG 24 hr tablet Take 10 mg by mouth daily  0    triamcinolone (KENALOG) 0 1 % ointment Apply 1 application topically 2 (two) times a day To affected area  1    valsartan (DIOVAN) 160 mg tablet Take 160 mg by mouth daily       No current facility-administered medications for this visit  Facility-Administered Medications Ordered in Other Visits   Medication Dose Route Frequency Provider Last Rate Last Admin    acetaminophen (TYLENOL) tablet 650 mg  650 mg Oral Once PRN Joel Hernandez, DO        albuterol (PROVENTIL HFA,VENTOLIN HFA) inhaler 3 puff  3 puff Inhalation Once PRN Joel Hernandez, DO         Allergies   Allergen Reactions    Ace Inhibitors      Other reaction(s): cough    Metformin      Other reaction(s): severe constipation    Penicillins Hives    Sulfa Antibiotics      Unknown reaction       Review of Systems   Constitutional: Positive for fatigue  Negative for fever  Respiratory: Negative for cough and shortness of breath  Gastrointestinal: Negative for diarrhea  Objective: There were no vitals filed for this visit  VIRTUAL VISIT DISCLAIMER    Loyda Erickson acknowledges that she has consented to an online visit or consultation  She understands that the online visit is based solely on information provided by her, and that, in the absence of a face-to-face physical evaluation by the physician, the diagnosis she receives is both limited and provisional in terms of accuracy and completeness  This is not intended to replace a full medical face-to-face evaluation by the physician  Loyda Erickson understands and accepts these terms

## 2021-01-25 NOTE — TELEPHONE ENCOUNTER
Spoke with Divya Rocha  She said Elma's cough has come back  It is non productive  She does not have any other symptoms  She does not have any medication for the cough and would like some

## 2021-01-25 NOTE — TELEPHONE ENCOUNTER
Ashley Lemus calling again stating the patient got her first antibody infusion and has finished her antibiotics  She states they were helping and now her cough has returned  She is asking if Dr Lomax would want to send in more antibiotics or what she should do moving forward   Please advise

## 2021-02-04 ENCOUNTER — HOSPITAL ENCOUNTER (EMERGENCY)
Facility: HOSPITAL | Age: 86
Discharge: HOME | End: 2021-02-05
Attending: EMERGENCY MEDICINE
Payer: MEDICARE

## 2021-02-04 VITALS
HEART RATE: 84 BPM | DIASTOLIC BLOOD PRESSURE: 78 MMHG | TEMPERATURE: 99.8 F | SYSTOLIC BLOOD PRESSURE: 175 MMHG | OXYGEN SATURATION: 98 % | RESPIRATION RATE: 18 BRPM

## 2021-02-04 DIAGNOSIS — R73.9 HYPERGLYCEMIA: ICD-10-CM

## 2021-02-04 DIAGNOSIS — B37.3 YEAST VAGINITIS: Primary | ICD-10-CM

## 2021-02-04 LAB
ANION GAP SERPL CALCULATED.3IONS-SCNC: 8 MMOL/L (ref 4–13)
BASOPHILS # BLD AUTO: 0.04 THOUSANDS/ΜL (ref 0–0.1)
BASOPHILS NFR BLD AUTO: 1 % (ref 0–1)
BUN SERPL-MCNC: 27 MG/DL (ref 5–25)
CALCIUM SERPL-MCNC: 8.9 MG/DL (ref 8.3–10.1)
CHLORIDE SERPL-SCNC: 103 MMOL/L (ref 100–108)
CO2 SERPL-SCNC: 26 MMOL/L (ref 21–32)
CREAT SERPL-MCNC: 1.94 MG/DL (ref 0.6–1.3)
EOSINOPHIL # BLD AUTO: 0.32 THOUSAND/ΜL (ref 0–0.61)
EOSINOPHIL NFR BLD AUTO: 5 % (ref 0–6)
ERYTHROCYTE [DISTWIDTH] IN BLOOD BY AUTOMATED COUNT: 15.2 % (ref 11.6–15.1)
GFR SERPL CREATININE-BSD FRML MDRD: 25 ML/MIN/1.73SQ M
GLUCOSE SERPL-MCNC: 281 MG/DL (ref 65–140)
GLUCOSE SERPL-MCNC: 284 MG/DL (ref 65–140)
HCT VFR BLD AUTO: 42 % (ref 34.8–46.1)
HGB BLD-MCNC: 13 G/DL (ref 11.5–15.4)
IMM GRANULOCYTES # BLD AUTO: 0.14 THOUSAND/UL (ref 0–0.2)
IMM GRANULOCYTES NFR BLD AUTO: 2 % (ref 0–2)
LYMPHOCYTES # BLD AUTO: 1.67 THOUSANDS/ΜL (ref 0.6–4.47)
LYMPHOCYTES NFR BLD AUTO: 25 % (ref 14–44)
MCH RBC QN AUTO: 28.4 PG (ref 26.8–34.3)
MCHC RBC AUTO-ENTMCNC: 31 G/DL (ref 31.4–37.4)
MCV RBC AUTO: 92 FL (ref 82–98)
MONOCYTES # BLD AUTO: 0.64 THOUSAND/ΜL (ref 0.17–1.22)
MONOCYTES NFR BLD AUTO: 9 % (ref 4–12)
NEUTROPHILS # BLD AUTO: 3.99 THOUSANDS/ΜL (ref 1.85–7.62)
NEUTS SEG NFR BLD AUTO: 58 % (ref 43–75)
NRBC BLD AUTO-RTO: 0 /100 WBCS
PLATELET # BLD AUTO: 164 THOUSANDS/UL (ref 149–390)
PMV BLD AUTO: 10.5 FL (ref 8.9–12.7)
POTASSIUM SERPL-SCNC: 5.6 MMOL/L (ref 3.5–5.3)
RBC # BLD AUTO: 4.58 MILLION/UL (ref 3.81–5.12)
SODIUM SERPL-SCNC: 137 MMOL/L (ref 136–145)
WBC # BLD AUTO: 6.8 THOUSAND/UL (ref 4.31–10.16)

## 2021-02-04 PROCEDURE — 99284 EMERGENCY DEPT VISIT MOD MDM: CPT | Performed by: EMERGENCY MEDICINE

## 2021-02-04 PROCEDURE — 36415 COLL VENOUS BLD VENIPUNCTURE: CPT | Performed by: EMERGENCY MEDICINE

## 2021-02-04 PROCEDURE — 85025 COMPLETE CBC W/AUTO DIFF WBC: CPT | Performed by: EMERGENCY MEDICINE

## 2021-02-04 PROCEDURE — 96374 THER/PROPH/DIAG INJ IV PUSH: CPT

## 2021-02-04 PROCEDURE — 96361 HYDRATE IV INFUSION ADD-ON: CPT

## 2021-02-04 PROCEDURE — 80048 BASIC METABOLIC PNL TOTAL CA: CPT | Performed by: EMERGENCY MEDICINE

## 2021-02-04 PROCEDURE — 99283 EMERGENCY DEPT VISIT LOW MDM: CPT

## 2021-02-04 PROCEDURE — 82948 REAGENT STRIP/BLOOD GLUCOSE: CPT

## 2021-02-04 RX ORDER — FLUCONAZOLE 100 MG/1
200 TABLET ORAL ONCE
Status: COMPLETED | OUTPATIENT
Start: 2021-02-04 | End: 2021-02-04

## 2021-02-04 RX ORDER — FLUCONAZOLE 150 MG/1
TABLET ORAL
Qty: 2 TABLET | Refills: 0 | Status: SHIPPED | OUTPATIENT
Start: 2021-02-04 | End: 2021-02-08

## 2021-02-04 RX ORDER — FLUCONAZOLE 150 MG/1
TABLET ORAL
Qty: 2 TABLET | Refills: 0 | Status: SHIPPED | OUTPATIENT
Start: 2021-02-04 | End: 2021-02-04 | Stop reason: SDUPTHER

## 2021-02-04 RX ADMIN — FLUCONAZOLE 200 MG: 100 TABLET ORAL at 22:19

## 2021-02-04 RX ADMIN — SODIUM CHLORIDE 1000 ML: 0.9 INJECTION, SOLUTION INTRAVENOUS at 22:19

## 2021-02-04 RX ADMIN — INSULIN HUMAN 5 UNITS: 100 INJECTION, SOLUTION PARENTERAL at 23:03

## 2021-02-05 NOTE — ED PROVIDER NOTES
History  Chief Complaint   Patient presents with    Vaginal Itching     pt reports burning down below for 3 days (not related to urinating)     35-year-old female comes in from vaginal burning  Patient states approximately 2 days ago she began to have burning down below  Patient states it has been related to urination  Patient states that she also has a white vaginal discharge  Patient states it is often happens when her sugars are too high  Patient is a known diabetic she states she has been checking her sugars and it is in the 200s  Patient takes insulin at home  No recent changes to her medication patient denies any fever chills chest pain shortness breath nausea vomiting      History provided by:  Patient   used: No    Vaginal Itching  Location:  Generalized vaginal discharge itching  Quality:  Itchiness  Severity:  Moderate  Onset quality:  Sudden  Duration:  2 days  Timing:  Constant  Progression:  Worsening  Chronicity:  Recurrent  Context:  As above  Associated symptoms: no abdominal pain, no chest pain, no congestion, no cough, no diarrhea, no ear pain, no fatigue, no fever, no headaches, no rash, no shortness of breath, no vomiting and no wheezing        Prior to Admission Medications   Prescriptions Last Dose Informant Patient Reported? Taking?    BD PEN NEEDLE SKYLER U/F 32G X 4 MM MISC  Self Yes No   Si SYRINGE BY MISCELLANEOUS ROUTE DAILY   COLCRYS 0 6 MG tablet  Self Yes No   Sig: Take 0 6 mg by mouth 2 (two) times a day   Calcium Carb-Cholecalciferol (CALTRATE 600+D3 SOFT PO)  Self Yes No   Sig: Take 2 tablets by mouth   Dulaglutide (TRULICITY) 1 5 IA/3 6DY SOPN  Self Yes No   Sig: Inject 1 5 mg under the skin once a week   JARDIANCE 10 MG TABS  Self Yes No   Sig: Take 10 mg by mouth daily   ONE TOUCH ULTRA TEST test strip  Self Yes No   Sig: USE FOR BLOOD GLUCOSE TESTING AS DIRECTED TWICE DAILY   ONETOUCH DELICA LANCETS 29O MISC  Self Yes No   Si SYRINGE BY MISCELLANEOUS ROUTE 2 TIMES A DAY AS NEEDED DX E11 9 LAST O/V 10/20/18   acetaminophen (TYLENOL) 325 mg tablet  Self No No   Sig: Take 2 tablets (650 mg total) by mouth every 6 (six) hours as needed for mild pain   albuterol (PROVENTIL HFA,VENTOLIN HFA) 90 mcg/act inhaler  Self Yes No   Sig: Inhale 2 puffs every 4 (four) hours as needed   aspirin (ECOTRIN LOW STRENGTH) 81 mg EC tablet  Self Yes No   Sig: Take 81 mg by mouth daily   atorvastatin (LIPITOR) 40 mg tablet  Self Yes No   Sig: Take 40 mg by mouth daily   clobetasol (TEMOVATE) 0 05 % ointment  Self Yes No   Sig: APPLY TOPICALLY 2 (TWO) TIMES A DAY APPLY NIGHTLY FOR 1 WEEK, THEN EVERY OTHER NIGHT   docusate sodium (COLACE) 100 mg capsule  Self Yes No   Sig: Take 100 mg by mouth 2 (two) times a day   fluconazole (DIFLUCAN) 150 mg tablet  Self Yes No   Sig: TAKE 1 TABLET BY MOUTH AS ONE DOSE   fluticasone-umeclidinium-vilanterol (TRELEGY) 100-62 5-25 MCG/INH inhaler  Self No No   Sig: Inhale 1 puff daily Rinse mouth after use     insulin glargine (BASAGLAR KWIKPEN) 100 units/mL injection pen  Self Yes No   Sig: Inject 20 Units under the skin daily   levothyroxine 100 mcg tablet  Self Yes No   Sig: Take 100 mcg by mouth daily   oxybutynin (DITROPAN-XL) 10 MG 24 hr tablet  Self Yes No   Sig: Take 10 mg by mouth daily   triamcinolone (KENALOG) 0 1 % ointment  Self Yes No   Sig: Apply 1 application topically 2 (two) times a day To affected area   valsartan (DIOVAN) 160 mg tablet  Self Yes No   Sig: Take 160 mg by mouth daily      Facility-Administered Medications: None       Past Medical History:   Diagnosis Date    Arthritis     Asthma     [facility]     [facility]     Hyperlipidemia     Hypertension     Hypothyroidism     Overactive bladder     [facility])        Past Surgical History:   Procedure Laterality Date    CAROTID ENDARTERECTOMY Right     CATARACT EXTRACTION  HYSTERECTOMY      NEPHRECTOMY      in her 25s       Family History   Problem Relation Age of Onset    Asthma Mother     Asthma Father     Diabetes Father     Lung cancer Sister     Early death Brother 43    COPD Daughter    Julieann Frankel Asthma Daughter     Rheum arthritis Daughter     Cataracts Daughter     Liver cancer Son     Alzheimer's disease Sister     Rectal cancer Sister     Thyroid cancer Sister     No Known Problems Sister     Asthma Son      I have reviewed and agree with the history as documented  E-Cigarette/Vaping    E-Cigarette Use Never User      E-Cigarette/Vaping Substances    Nicotine No     THC No     CBD No     Flavoring No     Other No     Unknown No      Social History     Tobacco Use    Smoking status: Former Smoker     Packs/day:      Start date:      Quit date:      Years since quittin 1    Smokeless tobacco: Never Used   Substance Use Topics    Alcohol use: Not Currently    Drug use: Never       Review of Systems   Constitutional: Negative for fatigue and fever  HENT: Negative for congestion and ear pain  Eyes: Negative for discharge and redness  Respiratory: Negative for apnea, cough, shortness of breath and wheezing  Cardiovascular: Negative for chest pain  Gastrointestinal: Negative for abdominal pain, diarrhea and vomiting  Endocrine: Negative for cold intolerance and polydipsia  Genitourinary: Negative for difficulty urinating and hematuria  Musculoskeletal: Negative for arthralgias and back pain  Skin: Negative for color change and rash  Allergic/Immunologic: Negative for environmental allergies and immunocompromised state  Neurological: Negative for numbness and headaches  Hematological: Negative for adenopathy  Does not bruise/bleed easily  Psychiatric/Behavioral: Negative for agitation and behavioral problems  Physical Exam  Physical Exam  Vitals signs and nursing note reviewed     Constitutional: Appearance: Normal appearance  She is well-developed  She is not toxic-appearing  HENT:      Head: Normocephalic and atraumatic  Right Ear: Tympanic membrane and external ear normal       Left Ear: Tympanic membrane and external ear normal       Nose: Nose normal  No nasal deformity or rhinorrhea  Mouth/Throat:      Dentition: Normal dentition  Pharynx: Uvula midline  Eyes:      General: Lids are normal          Right eye: No discharge  Left eye: No discharge  Conjunctiva/sclera: Conjunctivae normal       Pupils: Pupils are equal, round, and reactive to light  Neck:      Musculoskeletal: Normal range of motion and neck supple  Vascular: No carotid bruit or JVD  Trachea: Trachea normal    Cardiovascular:      Rate and Rhythm: Normal rate and regular rhythm  No extrasystoles are present  Chest Wall: PMI is not displaced  Pulses: Normal pulses  Pulmonary:      Effort: Pulmonary effort is normal  No accessory muscle usage or respiratory distress  Breath sounds: Normal breath sounds  No wheezing, rhonchi or rales  Abdominal:      General: Bowel sounds are normal       Palpations: Abdomen is soft  Abdomen is not rigid  There is no mass  Tenderness: There is no abdominal tenderness  There is no guarding or rebound  Musculoskeletal:      Right shoulder: She exhibits normal range of motion, no bony tenderness, no swelling and no deformity  Cervical back: Normal  She exhibits normal range of motion, no tenderness, no bony tenderness and no deformity  Lymphadenopathy:      Cervical: No cervical adenopathy  Skin:     General: Skin is warm and dry  Findings: No rash  Neurological:      Mental Status: She is alert and oriented to person, place, and time  GCS: GCS eye subscore is 4  GCS verbal subscore is 5  GCS motor subscore is 6  Cranial Nerves: No cranial nerve deficit  Sensory: No sensory deficit        Deep Tendon Reflexes: Reflexes are normal and symmetric     Psychiatric:         Speech: Speech normal          Behavior: Behavior normal          Vital Signs  ED Triage Vitals   Temperature Pulse Respirations Blood Pressure SpO2   02/04/21 2129 02/04/21 2128 02/04/21 2128 02/04/21 2128 02/04/21 2128   99 8 °F (37 7 °C) (!) 109 18 (!) 175/78 99 %      Temp Source Heart Rate Source Patient Position - Orthostatic VS BP Location FiO2 (%)   02/04/21 2129 02/04/21 2128 02/04/21 2128 02/04/21 2128 --   Temporal Monitor Sitting Left arm       Pain Score       --                  Vitals:    02/04/21 2128 02/04/21 2304   BP: (!) 175/78    Pulse: (!) 109 84   Patient Position - Orthostatic VS: Sitting          Visual Acuity      ED Medications  Medications   fluconazole (DIFLUCAN) tablet 200 mg (200 mg Oral Given 2/4/21 2219)   sodium chloride 0 9 % bolus 1,000 mL (1,000 mL Intravenous New Bag 2/4/21 2219)   insulin regular (HumuLIN R,NovoLIN R) injection 5 Units (5 Units Intravenous Given 2/4/21 2303)       Diagnostic Studies  Results Reviewed     Procedure Component Value Units Date/Time    Fingerstick Glucose (POCT) [[id number]]     Lab Status: No result     Basic metabolic panel [[id number]]  (Abnormal) Collected: 02/04/21 2219    Lab Status: Final result Specimen: Blood from Arm, Left Updated: 02/04/21 2251     Sodium 137 mmol/L      Potassium 5 6 mmol/L      Chloride 103 mmol/L      CO2 26 mmol/L      ANION GAP 8 mmol/L      BUN 27 mg/dL      Creatinine 1 94 mg/dL      Glucose 281 mg/dL      Calcium 8 9 mg/dL      eGFR 25 ml/min/1 73sq m     Narrative:      Michelle guidelines for Chronic Kidney Disease (CKD):     Stage 1 with normal or high GFR (GFR > 90 mL/min/1 73 square meters)    Stage 2 Mild CKD (GFR = 60-89 mL/min/1 73 square meters)    Stage 3A Moderate CKD (GFR = 45-59 mL/min/1 73 square meters)    Stage 3B Moderate CKD (GFR = 30-44 mL/min/1 73 square meters)    Stage 4 Severe CKD (GFR = 15-29 mL/min/1 73 square meters)    Stage 5 End Stage CKD (GFR <15 mL/min/1 73 square meters)  Note: GFR calculation is accurate only with a steady state creatinine    CBC and differential [[id number]]  (Abnormal) Collected: 02/04/21 2219    Lab Status: Final result Specimen: Blood from Arm, Left Updated: 02/04/21 2243     WBC 6 80 Thousand/uL      RBC 4 58 Million/uL      Hemoglobin 13 0 g/dL      Hematocrit 42 0 %      MCV 92 fL      MCH 28 4 pg      MCHC 31 0 g/dL      RDW 15 2 %      MPV 10 5 fL      Platelets 566 Thousands/uL      nRBC 0 /100 WBCs      Neutrophils Relative 58 %      Immat GRANS % 2 %      Lymphocytes Relative 25 %      Monocytes Relative 9 %      Eosinophils Relative 5 %      Basophils Relative 1 %      Neutrophils Absolute 3 99 Thousands/µL      Immature Grans Absolute 0 14 Thousand/uL      Lymphocytes Absolute 1 67 Thousands/µL      Monocytes Absolute 0 64 Thousand/µL      Eosinophils Absolute 0 32 Thousand/µL      Basophils Absolute 0 04 Thousands/µL     Fingerstick Glucose (POCT) [[id number]]  (Abnormal) Collected: 02/04/21 2216    Lab Status: Final result Updated: 02/04/21 2224     POC Glucose 284 mg/dl                  No orders to display              Procedures  ECG 12 Lead Documentation Only    Date/Time: 2/4/2021 10:11 PM  Performed by: Marielena Quiros DO  Authorized by: Marielena Quiros DO                ED Course                                           MDM  Number of Diagnoses or Management Options  Hyperglycemia: new and requires workup  Yeast vaginitis: new and does not require workup     Amount and/or Complexity of Data Reviewed  Clinical lab tests: ordered and reviewed  Tests in the medicine section of CPT®: ordered and reviewed  Review and summarize past medical records: yes    Patient Progress  Patient progress: improved      Disposition  Final diagnoses:   Yeast vaginitis   Hyperglycemia     Time reflects when diagnosis was documented in both MDM as applicable and the Disposition within this note     Time User Action Codes Description Comment    2/4/2021 11:07 PM Alex Nova Add [B37 3] Yeast vaginitis     2/4/2021 11:08 PM Dmitry BEST Add [R73 9] Hyperglycemia       ED Disposition     ED Disposition Condition Date/Time Comment    Discharge Stable Thu Feb 4, 2021 11:07 PM Finesse Bidkacey discharge to home/self care  Follow-up Information     Follow up With Specialties Details Why Contact Yadira Luong DO  Schedule an appointment as soon as possible for a visit   [place] [phone]            Current Discharge Medication List      START taking these medications    Details   !! fluconazole (DIFLUCAN) 150 mg tablet Take 1 tablet on 02/06 and 1 tablet on 02/08  Qty: 2 tablet, Refills: 0    Associated Diagnoses: Yeast vaginitis       ! ! - Potential duplicate medications found  Please discuss with provider  CONTINUE these medications which have NOT CHANGED    Details   acetaminophen (TYLENOL) 325 mg tablet Take 2 tablets (650 mg total) by mouth every 6 (six) hours as needed for mild pain  Qty: 30 tablet, Refills: 0    Associated Diagnoses: Closed fracture of fourth lumbar vertebra, unspecified fracture morphology, initial encounter ([facility])      albuterol (PROVENTIL HFA,VENTOLIN HFA) 90 mcg/act inhaler Inhale 2 puffs every 4 (four) hours as needed  Refills: 0    Comments: Substitution to a formulary equivalent within the same pharmaceutical class is authorized        aspirin (ECOTRIN LOW STRENGTH) 81 mg EC tablet Take 81 mg by mouth daily      atorvastatin (LIPITOR) 40 mg tablet Take 40 mg by mouth daily      BD PEN NEEDLE SKYLER U/F 32G X 4 MM MISC 1 SYRINGE BY MISCELLANEOUS ROUTE DAILY  Refills: 0      Calcium Carb-Cholecalciferol (CALTRATE 600+D3 SOFT PO) Take 2 tablets by mouth      clobetasol (TEMOVATE) 0 05 % ointment APPLY TOPICALLY 2 (TWO) TIMES A DAY APPLY NIGHTLY FOR 1 WEEK, THEN EVERY OTHER NIGHT      COLCRYS 0 6 MG tablet Take 0 6 mg by mouth 2 (two) times a day  Refills: 0      docusate sodium (COLACE) 100 mg capsule Take 100 mg by mouth 2 (two) times a day      Dulaglutide (TRULICITY) 1 5 CE/1 0ED SOPN Inject 1 5 mg under the skin once a week      !! fluconazole (DIFLUCAN) 150 mg tablet TAKE 1 TABLET BY MOUTH AS ONE DOSE  Refills: 0      fluticasone-umeclidinium-vilanterol (TRELEGY) 100-62 5-25 MCG/INH inhaler Inhale 1 puff daily Rinse mouth after use  Qty: 1 Inhaler, Refills: 5    Associated Diagnoses: Chronic obstructive pulmonary disease, unspecified COPD type (HCC)      insulin glargine (BASAGLAR KWIKPEN) 100 units/mL injection pen Inject 20 Units under the skin daily      JARDIANCE 10 MG TABS Take 10 mg by mouth daily  Refills: 0      levothyroxine 100 mcg tablet Take 100 mcg by mouth daily      ONE TOUCH ULTRA TEST test strip USE FOR BLOOD GLUCOSE TESTING AS DIRECTED TWICE DAILY  Refills: 1      ONETOUCH DELICA LANCETS 81D MISC 1 SYRINGE BY MISCELLANEOUS ROUTE 2 TIMES A DAY AS NEEDED DX E11 9 LAST O/V 10/20/18  Refills: 1      oxybutynin (DITROPAN-XL) 10 MG 24 hr tablet Take 10 mg by mouth daily  Refills: 0      triamcinolone (KENALOG) 0 1 % ointment Apply 1 application topically 2 (two) times a day To affected area  Refills: 1      valsartan (DIOVAN) 160 mg tablet Take 160 mg by mouth daily       ! ! - Potential duplicate medications found  Please discuss with provider  No discharge procedures on file      PDMP Review     None          ED Provider  Electronically Signed by           Azeem Ospina DO  02/05/21 0000

## 2021-02-16 ENCOUNTER — OFFICE VISIT (OUTPATIENT)
Dept: PODIATRY | Facility: CLINIC | Age: 86
End: 2021-02-16
Payer: MEDICARE

## 2021-02-16 VITALS
DIASTOLIC BLOOD PRESSURE: 85 MMHG | HEIGHT: 61 IN | SYSTOLIC BLOOD PRESSURE: 150 MMHG | HEART RATE: 85 BPM | WEIGHT: 153 LBS | BODY MASS INDEX: 28.89 KG/M2

## 2021-02-16 DIAGNOSIS — I73.9 PERIPHERAL VASCULAR DISEASE, UNSPECIFIED (HCC): Primary | ICD-10-CM

## 2021-02-16 DIAGNOSIS — E11.9 CONTROLLED TYPE 2 DIABETES MELLITUS WITHOUT COMPLICATION, WITHOUT LONG-TERM CURRENT USE OF INSULIN (HCC): ICD-10-CM

## 2021-02-16 PROCEDURE — 99213 OFFICE O/P EST LOW 20 MIN: CPT | Performed by: PODIATRIST

## 2021-02-16 NOTE — PROGRESS NOTES
Assessment/Plan:      Discussed principles of diabetic foot care  Sensorium is intact  There are no palpable pedal pulses  Patient urged to refrain from walking barefoot  All elongated toenails were trimmed  Patient is rescheduled in 3 months  No problem-specific Assessment & Plan notes found for this encounter  Diagnoses and all orders for this visit:    Peripheral vascular disease, unspecified ([facility] )    Controlled type 2 diabetes mellitus without complication, without long-term current use of insulin ([facility] )          Subjective:      Patient ID: Lauren Kiran is a 80 y o  female  HPI      Patient, a 80year-old type 2 diabetic utilizing oral medication for control presents for her yearly diabetic foot exam   Patient relates no foot problems  She does have long toenails that she cannot cut  She denies numbness or tingling in her feet  Last A1c was reviewed and it was 7 4 in 7/20  CBC   Of February of 2021 reviewed and it was essentially within normal limits  The following portions of the patient's history were reviewed and updated as appropriate: allergies, current medications, past family history, past medical history, past social history, past surgical history and problem list     Review of Systems   Respiratory: Positive for shortness of breath  Genitourinary:        Chronic renal disease   Psychiatric/Behavioral: Negative  Objective:      /85   Pulse 85   Ht 5' 1" (1 549 m) Comment: verbal  Wt 69 4 kg (153 lb)   BMI 28 91 kg/m²          Physical Exam  Cardiovascular:      Pulses: Pulses are weak  Dorsalis pedis pulses are 0 on the right side and 0 on the left side  Posterior tibial pulses are 0 on the right side and 0 on the left side  Feet:      Right foot:      Skin integrity: No ulcer, skin breakdown, erythema, warmth, callus or dry skin  Left foot:      Skin integrity: No ulcer, skin breakdown, erythema, warmth, callus or dry skin  Diabetic Foot Exam    Patient's shoes and socks removed  Right Foot/Ankle   Right Foot Inspection  Skin Exam: skin normal and skin intact no dry skin, no warmth, no callus, no erythema, no maceration, no abnormal color, no pre-ulcer, no ulcer and no callus                          Toe Exam: ROM and strength within normal limits  Sensory   Vibration: intact  Proprioception: intact   Monofilament testing: intact  Vascular  Capillary refills: elevated  The right DP pulse is 0  The right PT pulse is 0  Right Toe  - Comprehensive Exam  Ecchymosis: none  Arch: normal  Hammertoes: absent  Claw Toes: absent  Swelling: none   Tenderness: none         Left Foot/Ankle  Left Foot Inspection  Skin Exam: skin normal and skin intactno dry skin, no warmth, no erythema, no maceration, normal color, no pre-ulcer, no ulcer and no callus                         Toe Exam: ROM and strength within normal limits                   Sensory   Vibration: intact  Proprioception: intact  Monofilament: intact  Vascular  Capillary refills: elevated  The left DP pulse is 0  The left PT pulse is 0  Left Toe  - Comprehensive Exam  Ecchymosis: none  Arch: normal  Hammertoes: absent  Claw toes: absent  Swelling: none   Tenderness: none       Assign Risk Category:  No deformity present;  No loss of protective sensation; Weak pulses       Risk: 0

## 2021-03-29 ENCOUNTER — TRANSCRIBE ORDERS (OUTPATIENT)
Dept: LAB | Facility: CLINIC | Age: 86
End: 2021-03-29

## 2021-03-29 ENCOUNTER — APPOINTMENT (OUTPATIENT)
Dept: LAB | Facility: CLINIC | Age: 86
End: 2021-03-29
Payer: MEDICARE

## 2021-03-29 DIAGNOSIS — E78.2 MIXED HYPERLIPIDEMIA: ICD-10-CM

## 2021-03-29 DIAGNOSIS — I51.9 MYXEDEMA HEART DISEASE: ICD-10-CM

## 2021-03-29 DIAGNOSIS — E11.00 TYPE II DIABETES MELLITUS WITH HYPEROSMOLARITY, UNCONTROLLED (HCC): ICD-10-CM

## 2021-03-29 DIAGNOSIS — E11.22 TYPE 2 DIABETES MELLITUS WITH ESRD (END-STAGE RENAL DISEASE) (HCC): ICD-10-CM

## 2021-03-29 DIAGNOSIS — E11.65 TYPE II DIABETES MELLITUS WITH HYPEROSMOLARITY, UNCONTROLLED (HCC): ICD-10-CM

## 2021-03-29 DIAGNOSIS — N18.6 TYPE 2 DIABETES MELLITUS WITH ESRD (END-STAGE RENAL DISEASE) (HCC): ICD-10-CM

## 2021-03-29 DIAGNOSIS — E03.9 MYXEDEMA HEART DISEASE: ICD-10-CM

## 2021-03-29 DIAGNOSIS — E78.2 MIXED HYPERLIPIDEMIA: Primary | ICD-10-CM

## 2021-03-29 LAB
ALBUMIN SERPL BCP-MCNC: 3.5 G/DL (ref 3.5–5)
ALP SERPL-CCNC: 91 U/L (ref 46–116)
ALT SERPL W P-5'-P-CCNC: 25 U/L (ref 12–78)
ANION GAP SERPL CALCULATED.3IONS-SCNC: 9 MMOL/L (ref 4–13)
AST SERPL W P-5'-P-CCNC: 27 U/L (ref 5–45)
BASOPHILS # BLD AUTO: 0.06 THOUSANDS/ΜL (ref 0–0.1)
BASOPHILS NFR BLD AUTO: 1 % (ref 0–1)
BILIRUB SERPL-MCNC: 0.44 MG/DL (ref 0.2–1)
BUN SERPL-MCNC: 34 MG/DL (ref 5–25)
CALCIUM SERPL-MCNC: 10 MG/DL (ref 8.3–10.1)
CHLORIDE SERPL-SCNC: 106 MMOL/L (ref 100–108)
CHOLEST SERPL-MCNC: 159 MG/DL (ref 50–200)
CO2 SERPL-SCNC: 28 MMOL/L (ref 21–32)
CREAT SERPL-MCNC: 2.07 MG/DL (ref 0.6–1.3)
EOSINOPHIL # BLD AUTO: 0.42 THOUSAND/ΜL (ref 0–0.61)
EOSINOPHIL NFR BLD AUTO: 5 % (ref 0–6)
ERYTHROCYTE [DISTWIDTH] IN BLOOD BY AUTOMATED COUNT: 14.8 % (ref 11.6–15.1)
EST. AVERAGE GLUCOSE BLD GHB EST-MCNC: 160 MG/DL
GFR SERPL CREATININE-BSD FRML MDRD: 23 ML/MIN/1.73SQ M
GLUCOSE P FAST SERPL-MCNC: 98 MG/DL (ref 65–99)
HBA1C MFR BLD: 7.2 %
HCT VFR BLD AUTO: 47.3 % (ref 34.8–46.1)
HDLC SERPL-MCNC: 96 MG/DL
HGB BLD-MCNC: 15.6 G/DL (ref 11.5–15.4)
IMM GRANULOCYTES # BLD AUTO: 0.04 THOUSAND/UL (ref 0–0.2)
IMM GRANULOCYTES NFR BLD AUTO: 1 % (ref 0–2)
LDLC SERPL CALC-MCNC: 45 MG/DL (ref 0–100)
LYMPHOCYTES # BLD AUTO: 1.59 THOUSANDS/ΜL (ref 0.6–4.47)
LYMPHOCYTES NFR BLD AUTO: 20 % (ref 14–44)
MCH RBC QN AUTO: 29 PG (ref 26.8–34.3)
MCHC RBC AUTO-ENTMCNC: 33 G/DL (ref 31.4–37.4)
MCV RBC AUTO: 88 FL (ref 82–98)
MONOCYTES # BLD AUTO: 0.52 THOUSAND/ΜL (ref 0.17–1.22)
MONOCYTES NFR BLD AUTO: 7 % (ref 4–12)
NEUTROPHILS # BLD AUTO: 5.24 THOUSANDS/ΜL (ref 1.85–7.62)
NEUTS SEG NFR BLD AUTO: 66 % (ref 43–75)
NONHDLC SERPL-MCNC: 63 MG/DL
NRBC BLD AUTO-RTO: 0 /100 WBCS
PLATELET # BLD AUTO: 209 THOUSANDS/UL (ref 149–390)
PMV BLD AUTO: 10 FL (ref 8.9–12.7)
POTASSIUM SERPL-SCNC: 4.6 MMOL/L (ref 3.5–5.3)
PROT SERPL-MCNC: 8.7 G/DL (ref 6.4–8.2)
RBC # BLD AUTO: 5.38 MILLION/UL (ref 3.81–5.12)
SODIUM SERPL-SCNC: 143 MMOL/L (ref 136–145)
TRIGL SERPL-MCNC: 89 MG/DL
WBC # BLD AUTO: 7.87 THOUSAND/UL (ref 4.31–10.16)

## 2021-03-29 PROCEDURE — 80061 LIPID PANEL: CPT

## 2021-03-29 PROCEDURE — 83036 HEMOGLOBIN GLYCOSYLATED A1C: CPT

## 2021-03-29 PROCEDURE — 84443 ASSAY THYROID STIM HORMONE: CPT

## 2021-03-29 PROCEDURE — 80053 COMPREHEN METABOLIC PANEL: CPT

## 2021-03-29 PROCEDURE — 85025 COMPLETE CBC W/AUTO DIFF WBC: CPT

## 2021-03-29 PROCEDURE — 36415 COLL VENOUS BLD VENIPUNCTURE: CPT

## 2021-03-31 LAB — MISCELLANEOUS LAB TEST RESULT: NORMAL

## 2021-04-07 ENCOUNTER — TELEPHONE (OUTPATIENT)
Dept: PULMONOLOGY | Facility: CLINIC | Age: 86
End: 2021-04-07

## 2021-04-07 NOTE — TELEPHONE ENCOUNTER
Called and offered tomorrow with Dr Panfilo Skelton said she will just call the PCP  She prefers someone that knows her  I offered to schedule a routine follow up with Dr Melissa Orellana and she said she will call back

## 2021-04-07 NOTE — TELEPHONE ENCOUNTER
Spoke to Eloy about Lisa Yo  She said since March, Elma's cough is getting worse  She said you can hear mucous in her chest, but she has a hard time bringing it up  She has tried Mucinex and is using her nebulizer BID  She has some wheezing and SOB as well  Denies chest tightness, fevers and chills  Can you see her this week some time?

## 2021-04-26 ENCOUNTER — IMMUNIZATIONS (OUTPATIENT)
Dept: FAMILY MEDICINE CLINIC | Facility: HOSPITAL | Age: 86
End: 2021-04-26

## 2021-04-26 DIAGNOSIS — Z23 ENCOUNTER FOR IMMUNIZATION: Primary | ICD-10-CM

## 2021-04-26 PROCEDURE — 91300 SARS-COV-2 / COVID-19 MRNA VACCINE (PFIZER-BIONTECH) 30 MCG: CPT

## 2021-04-26 PROCEDURE — 0001A SARS-COV-2 / COVID-19 MRNA VACCINE (PFIZER-BIONTECH) 30 MCG: CPT

## 2021-04-29 ENCOUNTER — OFFICE VISIT (OUTPATIENT)
Dept: OBGYN CLINIC | Facility: HOSPITAL | Age: 86
End: 2021-04-29
Payer: MEDICARE

## 2021-04-29 ENCOUNTER — HOSPITAL ENCOUNTER (OUTPATIENT)
Dept: RADIOLOGY | Facility: HOSPITAL | Age: 86
Discharge: HOME | End: 2021-04-29
Payer: MEDICARE

## 2021-04-29 VITALS
HEIGHT: 61 IN | SYSTOLIC BLOOD PRESSURE: 142 MMHG | HEART RATE: 108 BPM | BODY MASS INDEX: 29.98 KG/M2 | WEIGHT: 158.8 LBS | DIASTOLIC BLOOD PRESSURE: 74 MMHG

## 2021-04-29 DIAGNOSIS — M79.605 LEFT LEG PAIN: Primary | ICD-10-CM

## 2021-04-29 DIAGNOSIS — M79.605 LEFT LEG PAIN: ICD-10-CM

## 2021-04-29 DIAGNOSIS — M16.12 PRIMARY OSTEOARTHRITIS OF ONE HIP, LEFT: ICD-10-CM

## 2021-04-29 PROCEDURE — 73560 X-RAY EXAM OF KNEE 1 OR 2: CPT

## 2021-04-29 PROCEDURE — 73552 X-RAY EXAM OF FEMUR 2/>: CPT

## 2021-04-29 PROCEDURE — 99213 OFFICE O/P EST LOW 20 MIN: CPT | Performed by: PHYSICIAN ASSISTANT

## 2021-04-29 NOTE — PROGRESS NOTES
Assessment/Plan   Diagnoses and all orders for this visit:    Left leg pain    Primary osteoarthritis of one hip, left  - Fl Guided cortisone injection  - This will be scheduled for at least 2 weeks after her 2nd Covid vaccine which is to be on May 11  - Follow up with Dr Nathaniel Aguilar or Dr Russell Medrano in [place] [address]   Patient ID: Isela Khan is a 80 y o  female  Vitals:    04/29/21 1329   BP: 142/74   Pulse: (!) 80     81yo female comes in for an evaluation of her left hip and thigh  She has been having pain for about a month with no specific injury  The pain is in the anterior hip and thigh; it radiates to the anterior mid-thigh  It increases with hip motion  No fevers or chills  No rash  She is walking with a cane  The pain is dull in character, mild in severity, pain does not radiate and is not associated with numbness          The following portions of the patient's history were reviewed and updated as appropriate: allergies, current medications, past family history, past medical history, past social history, past surgical history and problem list     Review of Systems  Ortho Exam  Past Medical History:   Diagnosis Date    Arthritis     Asthma     COPD [facility]chronic obstructive [facility] disease) [facility]     [facility]     Hyperlipidemia     Hypertension     Hypothyroidism     Overactive bladder     PVD [facility]peripheral vascular disease) [facility]      Past Surgical History:   Procedure Laterality Date    CAROTID ENDARTERECTOMY Right     CATARACT EXTRACTION      HYSTERECTOMY      NEPHRECTOMY      in her 25s     Family History   Problem Relation Age of Onset    Asthma Mother     Asthma Father     Diabetes Father     Lung cancer Sister     Early death Brother 39    COPD Daughter     Asthma Daughter     Rheum arthritis Daughter     Cataracts Daughter     Liver cancer Son     Alzheimer's disease Sister     Rectal cancer Sister     Thyroid cancer Sister     No Known Problems Sister     Asthma Son      Social History     Occupational History    Not on file   Tobacco Use    Smoking status: Former Smoker     Packs/day: 1 00     Start date: 56     Quit date: 1973     Years since quittin 3    Smokeless tobacco: Never Used   Substance and Sexual Activity    Alcohol use: Not Currently    Drug use: Never    Sexual activity: Never     Partners: Male     Birth control/protection: Post-menopausal, Surgical       Review of Systems   Constitutional: Negative  HENT: Negative  Eyes: Negative  Respiratory: Negative  Cardiovascular: Negative  Gastrointestinal: Negative  Endocrine: Negative  Genitourinary: Negative  Musculoskeletal: As below      Allergic/Immunologic: Negative  Neurological: Negative  Hematological: Negative  Psychiatric/Behavioral: Negative  Objective   Physical Exam      I have personally reviewed pertinent films in PACS and my interpretation is Significant left hip OA  No severe OA on knee xray  No acute displaced fracture       · Constitutional: Awake, Alert, Oriented  · Eyes: EOMI  · Psych: Mood and affect appropriate  · Heart: regular rate and rhythm  · Lungs: No audible wheezing  · Abdomen: soft  · Lymph: no lymphedema       left Hip:  - Appearance   No swelling, discoloration, deformity, or ecchymosis  No shingles rash  - Palpation   No tenderness about the SI joint, iliac crest, anterior hip, or greater trochanter  - ROM   Flexion: 100, ER: 20, IR: 10 and Abduction: 20  Full, pain-free ROM in the knee    - Special Tests   Straight leg raise negative + pain with log rolling  - Motor   normal 5/5 in all planes  - NVI distally

## 2021-05-11 ENCOUNTER — OFFICE VISIT (OUTPATIENT)
Dept: PULMONOLOGY | Facility: CLINIC | Age: 86
End: 2021-05-11
Payer: MEDICARE

## 2021-05-11 VITALS
OXYGEN SATURATION: 98 % | DIASTOLIC BLOOD PRESSURE: 80 MMHG | SYSTOLIC BLOOD PRESSURE: 128 MMHG | HEIGHT: 61 IN | HEART RATE: 94 BPM | BODY MASS INDEX: 30.17 KG/M2 | WEIGHT: 159.8 LBS | TEMPERATURE: 98.2 F

## 2021-05-11 DIAGNOSIS — J44.9 COPD (CHRONIC OBSTRUCTIVE PULMONARY DISEASE) WITH CHRONIC BRONCHITIS (HCC): ICD-10-CM

## 2021-05-11 PROCEDURE — 99214 OFFICE O/P EST MOD 30 MIN: CPT | Performed by: INTERNAL MEDICINE

## 2021-05-11 RX ORDER — LEVALBUTEROL INHALATION SOLUTION 0.63 MG/3ML
0.63 SOLUTION RESPIRATORY (INHALATION) 3 TIMES DAILY
Qty: 180 ML | Refills: 3 | Status: SHIPPED | OUTPATIENT
Start: 2021-05-11

## 2021-05-11 RX ORDER — FLUTICASONE PROPIONATE 50 MCG
1 SPRAY, SUSPENSION (ML) NASAL
COMMUNITY

## 2021-05-11 RX ORDER — BUDESONIDE 0.5 MG/2ML
0.5 INHALANT ORAL 2 TIMES DAILY
Qty: 120 ML | Refills: 5 | Status: SHIPPED | OUTPATIENT
Start: 2021-05-11

## 2021-05-11 RX ORDER — FLUTICASONE PROPIONATE 110 UG/1
1 AEROSOL, METERED RESPIRATORY (INHALATION)
COMMUNITY
End: 2022-04-11

## 2021-05-11 RX ORDER — VALSARTAN AND HYDROCHLOROTHIAZIDE 160; 25 MG/1; MG/1
1 TABLET ORAL DAILY
COMMUNITY
Start: 2021-04-15 | End: 2022-01-10

## 2021-05-11 NOTE — PATIENT INSTRUCTIONS
Continue Trelegy one puff daily  Start taking the Levalbuterol followed by Budesonide in nebulizer twice a day (use Budesonide ONLY if unable to tolerate side effects of Levalbuterol)

## 2021-05-11 NOTE — ASSESSMENT & PLAN NOTE
Mrs Stan Arreola has recovered nicely from her recent COVID infection  She continues to use the trilogy 1 puff daily  She continues to have a moist cough  She has wheezing on exam   I have instructed her to take her trilogy but also start budesonide and leave albuterol twice a day for her nebulizer    She does not like taking albuterol as it makes her jittery I told her if the leave albuterol has the same affect she will use the budesonide only

## 2021-05-11 NOTE — PROGRESS NOTES
Assessment/Plan:    COPD (chronic obstructive pulmonary disease) with chronic bronchitis ([facility] )   Mrs Hallie Baugh has recovered nicely from her recent COVID infection  She continues to use the trilogy 1 puff daily  She continues to have a moist cough  She has wheezing on exam   I have instructed her to take her trilogy but also start budesonide and leave albuterol twice a day for her nebulizer  She does not like taking albuterol as it makes her jittery I told her if the leave albuterol has the same affect she will use the budesonide only       Diagnoses and all orders for this visit:    COPD (chronic obstructive pulmonary disease) with chronic bronchitis (HCC)  -     budesonide (PULMICORT) 0 5 mg/2 mL nebulizer solution; Take 1 vial (0 5 mg total) by nebulization 2 (two) times a day Rinse mouth after use  -     levalbuterol (XOPENEX) 0 63 mg/3 mL nebulizer solution; Take 1 vial (0 63 mg total) by nebulization 3 (three) times a day    Other orders  -     fluticasone (FLONASE) 50 mcg/act nasal spray; 1 spray into each nostril  -     fluticasone (FLOVENT HFA) 110 MCG/ACT inhaler; Inhale 1 puff  -     valsartan-hydrochlorothiazide (DIOVAN-HCT) 160-25 MG per tablet; Take 1 tablet by mouth daily  -     valsartan-hydrochlorothiazide (DIOVAN-HCT) 160-25 MG per tablet; Take 1 tablet by mouth daily          Subjective:      Patient ID: Humberto Cardenas is a 80 y o  female  Mrs Hallie Baugh is recovering nicely from her COVID infection  She is no longer short of breath but continues to cough  She is not coughing up any mucus but her daughter reports some audible wheeze  The following portions of the patient's history were reviewed and updated as appropriate: allergies, current medications, past family history, past medical history, past social history, past surgical history and problem list     Review of Systems   Respiratory: Positive for cough  All other systems reviewed and are negative          Objective:      BP 128/80 (BP Location: Left arm, Patient Position: Sitting, Cuff Size: Standard)   Pulse 94   Temp 98 2 °F (36 8 °C) (Tympanic)   Ht 5' 1" (1 549 m)   Wt 72 5 kg (159 lb 12 8 oz)   SpO2 98%   BMI 30 19 kg/m²          Physical Exam  Constitutional:       Appearance: She is well-developed  HENT:      Head: Normocephalic  Eyes:      Pupils: Pupils are equal, round, and reactive to light  Neck:      Musculoskeletal: Normal range of motion and neck supple  Cardiovascular:      Rate and Rhythm: Normal rate  Heart sounds: No murmur  Pulmonary:      Effort: Pulmonary effort is normal  No respiratory distress  Breath sounds: Wheezing present  No rales  Abdominal:      Palpations: Abdomen is soft  Musculoskeletal: Normal range of motion  Skin:     General: Skin is warm and dry  Neurological:      Mental Status: She is alert and oriented to person, place, and time

## 2021-05-13 ENCOUNTER — HOSPITAL ENCOUNTER (OUTPATIENT)
Dept: RADIOLOGY | Facility: HOSPITAL | Age: 86
Discharge: HOME | End: 2021-05-13
Payer: MEDICARE

## 2021-05-13 ENCOUNTER — TELEPHONE (OUTPATIENT)
Dept: OTHER | Facility: OTHER | Age: 86
End: 2021-05-13

## 2021-05-13 ENCOUNTER — IMMUNIZATIONS (OUTPATIENT)
Dept: FAMILY MEDICINE CLINIC | Facility: HOSPITAL | Age: 86
End: 2021-05-13

## 2021-05-13 ENCOUNTER — TRANSCRIBE ORDERS (OUTPATIENT)
Dept: ADMINISTRATIVE | Facility: HOSPITAL | Age: 86
End: 2021-05-13

## 2021-05-13 DIAGNOSIS — Z23 ENCOUNTER FOR IMMUNIZATION: Primary | ICD-10-CM

## 2021-05-13 DIAGNOSIS — K59.09 OTHER CONSTIPATION: Primary | ICD-10-CM

## 2021-05-13 DIAGNOSIS — K59.09 OTHER CONSTIPATION: ICD-10-CM

## 2021-05-13 PROCEDURE — 91300 SARS-COV-2 / COVID-19 MRNA VACCINE (PFIZER-BIONTECH) 30 MCG: CPT

## 2021-05-13 PROCEDURE — 74022 RADEX COMPL AQT ABD SERIES: CPT

## 2021-05-13 PROCEDURE — 0002A SARS-COV-2 / COVID-19 MRNA VACCINE (PFIZER-BIONTECH) 30 MCG: CPT

## 2021-05-13 NOTE — TELEPHONE ENCOUNTER
Patient called to see if there were any COVID vaccination appointments available at the [address] [place] [address] today  She was advised that there is a walk-in vaccination clinic at [facility] today from [address] [place] [address]  Notified patient that the clinic is offering [employer] vaccine  The address was provided

## 2021-05-24 ENCOUNTER — OFFICE VISIT (OUTPATIENT)
Dept: PODIATRY | Facility: CLINIC | Age: 86
End: 2021-05-24
Payer: MEDICARE

## 2021-05-24 VITALS
BODY MASS INDEX: 30.55 KG/M2 | SYSTOLIC BLOOD PRESSURE: 189 MMHG | HEART RATE: 106 BPM | WEIGHT: 161.8 LBS | HEIGHT: 61 IN | DIASTOLIC BLOOD PRESSURE: 104 MMHG

## 2021-05-24 DIAGNOSIS — L84 CORNS: ICD-10-CM

## 2021-05-24 DIAGNOSIS — I73.9 PERIPHERAL VASCULAR DISEASE, UNSPECIFIED (HCC): Primary | ICD-10-CM

## 2021-05-24 PROCEDURE — G0127 TRIM NAIL(S): HCPCS | Performed by: PODIATRIST

## 2021-05-24 PROCEDURE — 11056 PARNG/CUTG B9 HYPRKR LES 2-4: CPT | Performed by: PODIATRIST

## 2021-05-24 NOTE — PROGRESS NOTES
Established patient with class findings presents for nail and lesion care  Vascular exam:  DP  0/4 bilateral; PT  0 4 bilateral   Dermatological exam:  Each toenail is thick and  Dystrophic  Hyperkeratotic lesion right 5th toe and soft corn right 2nd toe  Diagnosis:  Diabetes mellitus; PVD;   Hyperkeratotic lesion right 5th toe and right 2nd toe  Treatment: Trimmed multiple dystrophic toenails  Trimmed hyperkeratotic lesions right foot      Nail removal    Date/Time: 5/24/2021 4:03 PM  Performed by: Yvon Ty DPM  Authorized by: Yvon Ty DPM     Nails trimmed:     Number of nails trimmed:  10  Lesion Destruction    Date/Time: 5/24/2021 4:03 PM  Performed by: Yvon Ty DPM  Authorized by: Yvon Ty DPM     Procedure Details - Lesion Destruction:     Number of Lesions:  2  Lesion 1:     Body area:  Lower extremity    Lower extremity location:  R little toe    Malignancy: benign hyperkeratotic lesion      Destruction method: scissors used for extraction    Lesion 2:     Body area:  Lower extremity    Lower extremity location:  R second toe    Malignancy: benign hyperkeratotic lesion      Destruction method: scissors used for extraction

## 2021-07-02 ENCOUNTER — TELEPHONE (OUTPATIENT)
Dept: PULMONOLOGY | Facility: CLINIC | Age: 86
End: 2021-07-02

## 2021-08-02 ENCOUNTER — OFFICE VISIT (OUTPATIENT)
Dept: PODIATRY | Facility: CLINIC | Age: 86
End: 2021-08-02
Payer: MEDICARE

## 2021-08-02 VITALS
HEIGHT: 61 IN | WEIGHT: 164.5 LBS | HEART RATE: 98 BPM | SYSTOLIC BLOOD PRESSURE: 179 MMHG | BODY MASS INDEX: 31.06 KG/M2 | DIASTOLIC BLOOD PRESSURE: 88 MMHG

## 2021-08-02 DIAGNOSIS — L84 CORNS: ICD-10-CM

## 2021-08-02 DIAGNOSIS — I73.9 PERIPHERAL VASCULAR DISEASE, UNSPECIFIED (HCC): Primary | ICD-10-CM

## 2021-08-02 PROCEDURE — 11055 PARING/CUTG B9 HYPRKER LES 1: CPT | Performed by: PODIATRIST

## 2021-08-02 PROCEDURE — G0127 TRIM NAIL(S): HCPCS | Performed by: PODIATRIST

## 2021-08-02 NOTE — PROGRESS NOTES
Established patient with class findings presents for nail and  Lesion care  Vascular exam:  DP  0/4 bilateral; PT  0 4 bilateral   Dermatological exam:  Each toenail is thick and  Dystrophic  Hyperkeratotic lesion medial aspect right 2nd toe  Diagnosis:  Diabetes mellitus;  PVD; hyperkeratotic lesion right 2nd toe  Treatment: Trimmed multiple dystrophic toenails  Trimmed hyperkeratotic lesion right 2nd toe      Nail removal    Date/Time: 8/2/2021 3:05 PM  Performed by: Genesis Stoner DPM  Authorized by: Genesis Stoner DPM     Nails trimmed:     Number of nails trimmed:  10  Lesion Destruction    Date/Time: 8/2/2021 3:06 PM  Performed by: Genesis Stoner DPM  Authorized by: Genesis Stoner DPM     Procedure Details - Lesion Destruction:     Number of Lesions:  1  Lesion 1:     Body area:  Lower extremity    Lower extremity location:  R second toe    Malignancy: benign hyperkeratotic lesion      Destruction method: scissors used for extraction

## 2021-08-16 ENCOUNTER — APPOINTMENT (OUTPATIENT)
Dept: LAB | Facility: CLINIC | Age: 86
End: 2021-08-16
Payer: MEDICARE

## 2021-08-16 DIAGNOSIS — E11.65 UNCONTROLLED TYPE 2 DIABETES MELLITUS WITH HYPERGLYCEMIA (HCC): ICD-10-CM

## 2021-08-16 DIAGNOSIS — I10 HYPERTENSION, UNSPECIFIED TYPE: ICD-10-CM

## 2021-08-16 DIAGNOSIS — E11.22 TYPE 2 DIABETES MELLITUS WITH DIABETIC CHRONIC KIDNEY DISEASE, UNSPECIFIED CKD STAGE, UNSPECIFIED WHETHER LONG TERM INSULIN USE (HCC): ICD-10-CM

## 2021-08-16 DIAGNOSIS — J44.9 OBSTRUCTIVE CHRONIC BRONCHITIS WITHOUT EXACERBATION (HCC): ICD-10-CM

## 2021-08-16 DIAGNOSIS — E03.9 ADULT HYPOTHYROIDISM: ICD-10-CM

## 2021-08-16 DIAGNOSIS — E78.2 MIXED HYPERLIPIDEMIA: ICD-10-CM

## 2021-08-16 LAB
ALBUMIN SERPL BCP-MCNC: 3.4 G/DL (ref 3.5–5)
ALP SERPL-CCNC: 85 U/L (ref 46–116)
ALT SERPL W P-5'-P-CCNC: 20 U/L (ref 12–78)
ANION GAP SERPL CALCULATED.3IONS-SCNC: 10 MMOL/L (ref 4–13)
AST SERPL W P-5'-P-CCNC: 24 U/L (ref 5–45)
BASOPHILS # BLD AUTO: 0.06 THOUSANDS/ΜL (ref 0–0.1)
BASOPHILS NFR BLD AUTO: 1 % (ref 0–1)
BILIRUB SERPL-MCNC: 0.25 MG/DL (ref 0.2–1)
BUN SERPL-MCNC: 37 MG/DL (ref 5–25)
CALCIUM ALBUM COR SERPL-MCNC: 9.3 MG/DL (ref 8.3–10.1)
CALCIUM SERPL-MCNC: 8.8 MG/DL (ref 8.3–10.1)
CHLORIDE SERPL-SCNC: 104 MMOL/L (ref 100–108)
CHOLEST SERPL-MCNC: 137 MG/DL (ref 50–200)
CO2 SERPL-SCNC: 26 MMOL/L (ref 21–32)
CREAT SERPL-MCNC: 2.15 MG/DL (ref 0.6–1.3)
EOSINOPHIL # BLD AUTO: 0.37 THOUSAND/ΜL (ref 0–0.61)
EOSINOPHIL NFR BLD AUTO: 5 % (ref 0–6)
ERYTHROCYTE [DISTWIDTH] IN BLOOD BY AUTOMATED COUNT: 14.8 % (ref 11.6–15.1)
GFR SERPL CREATININE-BSD FRML MDRD: 22 ML/MIN/1.73SQ M
GLUCOSE P FAST SERPL-MCNC: 153 MG/DL (ref 65–99)
HCT VFR BLD AUTO: 44.5 % (ref 34.8–46.1)
HDLC SERPL-MCNC: 71 MG/DL
HGB BLD-MCNC: 14.3 G/DL (ref 11.5–15.4)
IMM GRANULOCYTES # BLD AUTO: 0.04 THOUSAND/UL (ref 0–0.2)
IMM GRANULOCYTES NFR BLD AUTO: 1 % (ref 0–2)
LDLC SERPL CALC-MCNC: 41 MG/DL (ref 0–100)
LYMPHOCYTES # BLD AUTO: 2.2 THOUSANDS/ΜL (ref 0.6–4.47)
LYMPHOCYTES NFR BLD AUTO: 30 % (ref 14–44)
MCH RBC QN AUTO: 27.8 PG (ref 26.8–34.3)
MCHC RBC AUTO-ENTMCNC: 32.1 G/DL (ref 31.4–37.4)
MCV RBC AUTO: 86 FL (ref 82–98)
MONOCYTES # BLD AUTO: 0.54 THOUSAND/ΜL (ref 0.17–1.22)
MONOCYTES NFR BLD AUTO: 7 % (ref 4–12)
NEUTROPHILS # BLD AUTO: 4.19 THOUSANDS/ΜL (ref 1.85–7.62)
NEUTS SEG NFR BLD AUTO: 56 % (ref 43–75)
NONHDLC SERPL-MCNC: 66 MG/DL
NRBC BLD AUTO-RTO: 0 /100 WBCS
PLATELET # BLD AUTO: 191 THOUSANDS/UL (ref 149–390)
PMV BLD AUTO: 9.9 FL (ref 8.9–12.7)
POTASSIUM SERPL-SCNC: 4.4 MMOL/L (ref 3.5–5.3)
PROT SERPL-MCNC: 7.9 G/DL (ref 6.4–8.2)
RBC # BLD AUTO: 5.15 MILLION/UL (ref 3.81–5.12)
SODIUM SERPL-SCNC: 140 MMOL/L (ref 136–145)
TRIGL SERPL-MCNC: 124 MG/DL
URATE SERPL-MCNC: 7 MG/DL (ref 2–6.8)
WBC # BLD AUTO: 7.4 THOUSAND/UL (ref 4.31–10.16)

## 2021-08-16 PROCEDURE — 84550 ASSAY OF BLOOD/URIC ACID: CPT

## 2021-08-16 PROCEDURE — 85025 COMPLETE CBC W/AUTO DIFF WBC: CPT

## 2021-08-16 PROCEDURE — 80053 COMPREHEN METABOLIC PANEL: CPT

## 2021-08-16 PROCEDURE — 84443 ASSAY THYROID STIM HORMONE: CPT

## 2021-08-16 PROCEDURE — 80061 LIPID PANEL: CPT

## 2021-08-23 LAB — MISCELLANEOUS LAB TEST RESULT: NORMAL

## 2021-12-04 ENCOUNTER — IMMUNIZATIONS (OUTPATIENT)
Dept: FAMILY MEDICINE CLINIC | Facility: HOSPITAL | Age: 86
End: 2021-12-04

## 2021-12-04 DIAGNOSIS — Z23 ENCOUNTER FOR IMMUNIZATION: Primary | ICD-10-CM

## 2021-12-04 PROCEDURE — 0001A COVID-19 PFIZER VACC 0.3 ML: CPT

## 2021-12-04 PROCEDURE — 91300 COVID-19 PFIZER VACC 0.3 ML: CPT

## 2021-12-07 RX ORDER — HYDROCHLOROTHIAZIDE 25 MG/1
TABLET ORAL
COMMUNITY
Start: 2021-11-12

## 2021-12-07 RX ORDER — DULAGLUTIDE 0.75 MG/.5ML
INJECTION, SOLUTION SUBCUTANEOUS
COMMUNITY
Start: 2021-12-03

## 2021-12-07 RX ORDER — VALSARTAN 320 MG/1
TABLET ORAL
COMMUNITY
Start: 2021-11-12

## 2021-12-08 ENCOUNTER — OFFICE VISIT (OUTPATIENT)
Dept: PULMONOLOGY | Facility: CLINIC | Age: 86
End: 2021-12-08
Payer: MEDICARE

## 2021-12-08 VITALS
HEIGHT: 61 IN | RESPIRATION RATE: 16 BRPM | DIASTOLIC BLOOD PRESSURE: 60 MMHG | HEART RATE: 110 BPM | TEMPERATURE: 97.2 F | WEIGHT: 167 LBS | BODY MASS INDEX: 31.53 KG/M2 | SYSTOLIC BLOOD PRESSURE: 122 MMHG | OXYGEN SATURATION: 98 %

## 2021-12-08 DIAGNOSIS — J44.9 COPD (CHRONIC OBSTRUCTIVE PULMONARY DISEASE) WITH CHRONIC BRONCHITIS (HCC): Primary | ICD-10-CM

## 2021-12-08 PROCEDURE — 99214 OFFICE O/P EST MOD 30 MIN: CPT | Performed by: INTERNAL MEDICINE

## 2022-01-10 ENCOUNTER — OFFICE VISIT (OUTPATIENT)
Dept: PODIATRY | Facility: CLINIC | Age: 87
End: 2022-01-10
Payer: MEDICARE

## 2022-01-10 VITALS
BODY MASS INDEX: 32.13 KG/M2 | HEART RATE: 103 BPM | DIASTOLIC BLOOD PRESSURE: 84 MMHG | WEIGHT: 170.2 LBS | SYSTOLIC BLOOD PRESSURE: 165 MMHG | HEIGHT: 61 IN

## 2022-01-10 DIAGNOSIS — I73.9 PERIPHERAL VASCULAR DISEASE, UNSPECIFIED (HCC): Primary | ICD-10-CM

## 2022-01-10 DIAGNOSIS — L84 CORNS: ICD-10-CM

## 2022-01-10 PROCEDURE — 11055 PARING/CUTG B9 HYPRKER LES 1: CPT | Performed by: PODIATRIST

## 2022-01-10 PROCEDURE — G0127 TRIM NAIL(S): HCPCS | Performed by: PODIATRIST

## 2022-01-10 NOTE — PROGRESS NOTES
Established patient with class findings presents for nail and lesion care  Vascular exam:  DP  0/4 bilateral; PT  0 4 bilateral   Dermatological exam:  Each toenail is thick and  Dystrophic  Hyperkeratotic lesion medial aspect right 2nd toe  Diagnosis:  Diabetes mellitus; PVD  Treatment: Trimmed multiple dystrophic toenails  Trimmed hyperkeratotic lesion right 2nd toe      Nail removal    Date/Time: 1/10/2022 3:20 PM  Performed by: Loren Hashimoto, DPM  Authorized by: Loren Hashimoto, DPM     Nails trimmed:     Number of nails trimmed:  10  Lesion Destruction    Date/Time: 1/10/2022 3:21 PM  Performed by: Loren Hashimoto, DPM  Authorized by: Loren Hashimoto, DPM     Procedure Details - Lesion Destruction:     Number of Lesions:  1  Lesion 1:     Body area:  Lower extremity    Lower extremity location:  R second toe    Malignancy: benign hyperkeratotic lesion      Destruction method: scissors used for extraction

## 2022-01-18 ENCOUNTER — NURSE TRIAGE (OUTPATIENT)
Dept: OTHER | Facility: OTHER | Age: 87
End: 2022-01-18

## 2022-01-18 DIAGNOSIS — Z20.822 ENCOUNTER FOR SCREENING LABORATORY TESTING FOR COVID-19 VIRUS: Primary | ICD-10-CM

## 2022-01-18 NOTE — TELEPHONE ENCOUNTER
Reason for Disposition   [1] COVID-19 infection suspected by caller or triager AND [2] mild symptoms (cough, fever, or others) AND [3] has not gotten tested yet    Answer Assessment - Initial Assessment Questions  Were you within 6 feet or less, for up to 15 minutes or more with a person that has a confirmed COVID-19 test?  She was exposed to her covid positive granddaughter  What was the date of your exposure? 1/11/22  Her symptoms began 1/15/22  Are you experiencing any symptoms attributed to the virus?  (Assess for SOB, cough, fever, difficulty breathing)   Worsening cough, fatigue, body aches, decreased appetite, headache, runny nose, congestion  HIGH RISK: Do you have any history heart or lung conditions, weakened immune system, diabetes, Asthma, CHF, HIV, COPD, Chemo, renal failure, sickle cell, etc?   COPD      Vaccination status:  Pfizer x 3 doses  Protocols used: CORONAVIRUS (TANYO-36) DIAGNOSED OR SUSPECTED-ADULT-OH      Encouraged to follow up with PCP  Covid swab ordered  Covid testing site locations and appropriate timing of testing reviewed

## 2022-01-18 NOTE — TELEPHONE ENCOUNTER
Regarding: COVID SYMPTOMATIC COUGH   ----- Message from Catherine Badillo sent at 1/18/2022 10:09 AM EST -----  Patient has a cough and her daughter wants her to be tested

## 2022-01-20 ENCOUNTER — APPOINTMENT (OUTPATIENT)
Dept: LAB | Facility: CLINIC | Age: 87
End: 2022-01-20
Payer: MEDICARE

## 2022-01-20 DIAGNOSIS — IMO0002 UNCONTROLLED TYPE 2 DIABETES MELLITUS WITH CHRONIC KIDNEY DISEASE: ICD-10-CM

## 2022-01-20 DIAGNOSIS — N18.6 TYPE 2 DIABETES MELLITUS WITH ESRD (END-STAGE RENAL DISEASE) (HCC): ICD-10-CM

## 2022-01-20 DIAGNOSIS — I51.9 MYXEDEMA HEART DISEASE: ICD-10-CM

## 2022-01-20 DIAGNOSIS — E03.9 MYXEDEMA HEART DISEASE: ICD-10-CM

## 2022-01-20 DIAGNOSIS — E78.5 HYPERLIPIDEMIA, UNSPECIFIED HYPERLIPIDEMIA TYPE: ICD-10-CM

## 2022-01-20 DIAGNOSIS — I10 ESSENTIAL HYPERTENSION, MALIGNANT: ICD-10-CM

## 2022-01-20 DIAGNOSIS — E11.22 TYPE 2 DIABETES MELLITUS WITH ESRD (END-STAGE RENAL DISEASE) (HCC): ICD-10-CM

## 2022-01-20 LAB
ALBUMIN SERPL BCP-MCNC: 3.5 G/DL (ref 3.5–5)
ALP SERPL-CCNC: 89 U/L (ref 46–116)
ALT SERPL W P-5'-P-CCNC: 18 U/L (ref 12–78)
ANION GAP SERPL CALCULATED.3IONS-SCNC: 10 MMOL/L (ref 4–13)
AST SERPL W P-5'-P-CCNC: 18 U/L (ref 5–45)
BASOPHILS # BLD AUTO: 0.05 THOUSANDS/ΜL (ref 0–0.1)
BASOPHILS NFR BLD AUTO: 1 % (ref 0–1)
BILIRUB SERPL-MCNC: 0.33 MG/DL (ref 0.2–1)
BUN SERPL-MCNC: 36 MG/DL (ref 5–25)
CALCIUM SERPL-MCNC: 9.1 MG/DL (ref 8.3–10.1)
CHLORIDE SERPL-SCNC: 103 MMOL/L (ref 100–108)
CHOLEST SERPL-MCNC: 137 MG/DL
CO2 SERPL-SCNC: 27 MMOL/L (ref 21–32)
CREAT SERPL-MCNC: 1.93 MG/DL (ref 0.6–1.3)
EOSINOPHIL # BLD AUTO: 0.32 THOUSAND/ΜL (ref 0–0.61)
EOSINOPHIL NFR BLD AUTO: 4 % (ref 0–6)
ERYTHROCYTE [DISTWIDTH] IN BLOOD BY AUTOMATED COUNT: 13.9 % (ref 11.6–15.1)
EST. AVERAGE GLUCOSE BLD GHB EST-MCNC: 169 MG/DL
GFR SERPL CREATININE-BSD FRML MDRD: 21 ML/MIN/1.73SQ M
GLUCOSE P FAST SERPL-MCNC: 112 MG/DL (ref 65–99)
HBA1C MFR BLD: 7.5 %
HCT VFR BLD AUTO: 44.9 % (ref 34.8–46.1)
HDLC SERPL-MCNC: 69 MG/DL
HGB BLD-MCNC: 14.7 G/DL (ref 11.5–15.4)
IMM GRANULOCYTES # BLD AUTO: 0.03 THOUSAND/UL (ref 0–0.2)
IMM GRANULOCYTES NFR BLD AUTO: 0 % (ref 0–2)
LDLC SERPL CALC-MCNC: 44 MG/DL (ref 0–100)
LYMPHOCYTES # BLD AUTO: 2.11 THOUSANDS/ΜL (ref 0.6–4.47)
LYMPHOCYTES NFR BLD AUTO: 27 % (ref 14–44)
MCH RBC QN AUTO: 28.4 PG (ref 26.8–34.3)
MCHC RBC AUTO-ENTMCNC: 32.7 G/DL (ref 31.4–37.4)
MCV RBC AUTO: 87 FL (ref 82–98)
MONOCYTES # BLD AUTO: 0.57 THOUSAND/ΜL (ref 0.17–1.22)
MONOCYTES NFR BLD AUTO: 7 % (ref 4–12)
NEUTROPHILS # BLD AUTO: 4.79 THOUSANDS/ΜL (ref 1.85–7.62)
NEUTS SEG NFR BLD AUTO: 61 % (ref 43–75)
NONHDLC SERPL-MCNC: 68 MG/DL
NRBC BLD AUTO-RTO: 0 /100 WBCS
PLATELET # BLD AUTO: 210 THOUSANDS/UL (ref 149–390)
PMV BLD AUTO: 10.4 FL (ref 8.9–12.7)
POTASSIUM SERPL-SCNC: 4.4 MMOL/L (ref 3.5–5.3)
PROT SERPL-MCNC: 8.3 G/DL (ref 6.4–8.2)
RBC # BLD AUTO: 5.18 MILLION/UL (ref 3.81–5.12)
SODIUM SERPL-SCNC: 140 MMOL/L (ref 136–145)
TRIGL SERPL-MCNC: 121 MG/DL
URATE SERPL-MCNC: 6.5 MG/DL (ref 2–6.8)
WBC # BLD AUTO: 7.87 THOUSAND/UL (ref 4.31–10.16)

## 2022-01-20 PROCEDURE — 84550 ASSAY OF BLOOD/URIC ACID: CPT

## 2022-01-20 PROCEDURE — 80053 COMPREHEN METABOLIC PANEL: CPT

## 2022-01-20 PROCEDURE — U0003 INFECTIOUS AGENT DETECTION BY NUCLEIC ACID (DNA OR RNA); SEVERE ACUTE RESPIRATORY SYNDROME CORONAVIRUS 2 (SARS-COV-2) (CORONAVIRUS DISEASE [COVID-19]), AMPLIFIED PROBE TECHNIQUE, MAKING USE OF HIGH THROUGHPUT TECHNOLOGIES AS DESCRIBED BY CMS-2020-01-R: HCPCS | Performed by: FAMILY MEDICINE

## 2022-01-20 PROCEDURE — 83036 HEMOGLOBIN GLYCOSYLATED A1C: CPT

## 2022-01-20 PROCEDURE — 36415 COLL VENOUS BLD VENIPUNCTURE: CPT

## 2022-01-20 PROCEDURE — 80061 LIPID PANEL: CPT

## 2022-01-20 PROCEDURE — 84443 ASSAY THYROID STIM HORMONE: CPT

## 2022-01-20 PROCEDURE — 85025 COMPLETE CBC W/AUTO DIFF WBC: CPT

## 2022-01-20 PROCEDURE — U0005 INFEC AGEN DETEC AMPLI PROBE: HCPCS | Performed by: FAMILY MEDICINE

## 2022-01-24 ENCOUNTER — NEW PATIENT COMPREHENSIVE (OUTPATIENT)
Dept: URBAN - METROPOLITAN AREA CLINIC 6 | Facility: CLINIC | Age: 87
End: 2022-01-24

## 2022-01-24 DIAGNOSIS — Z79.4: ICD-10-CM

## 2022-01-24 DIAGNOSIS — Z96.1: ICD-10-CM

## 2022-01-24 DIAGNOSIS — H35.3131: ICD-10-CM

## 2022-01-24 DIAGNOSIS — E11.9: ICD-10-CM

## 2022-01-24 LAB — MISCELLANEOUS LAB TEST RESULT: NORMAL

## 2022-01-24 PROCEDURE — 92134 CPTRZ OPH DX IMG PST SGM RTA: CPT

## 2022-01-24 PROCEDURE — 92004 COMPRE OPH EXAM NEW PT 1/>: CPT

## 2022-01-24 PROCEDURE — 92015 DETERMINE REFRACTIVE STATE: CPT

## 2022-01-24 ASSESSMENT — TONOMETRY
OD_IOP_MMHG: 20
OS_IOP_MMHG: 20

## 2022-01-24 ASSESSMENT — VISUAL ACUITY
OD_CC: 20/40-2
OU_CC: J1
OS_CC: 20/30-1

## 2022-04-11 ENCOUNTER — OFFICE VISIT (OUTPATIENT)
Dept: PODIATRY | Facility: CLINIC | Age: 87
End: 2022-04-11
Payer: MEDICARE

## 2022-04-11 VITALS
BODY MASS INDEX: 32.74 KG/M2 | DIASTOLIC BLOOD PRESSURE: 78 MMHG | SYSTOLIC BLOOD PRESSURE: 162 MMHG | WEIGHT: 173.4 LBS | HEART RATE: 108 BPM | HEIGHT: 61 IN

## 2022-04-11 DIAGNOSIS — E11.9 CONTROLLED TYPE 2 DIABETES MELLITUS WITHOUT COMPLICATION, WITHOUT LONG-TERM CURRENT USE OF INSULIN (HCC): ICD-10-CM

## 2022-04-11 DIAGNOSIS — I73.9 PERIPHERAL VASCULAR DISEASE, UNSPECIFIED (HCC): Primary | ICD-10-CM

## 2022-04-11 PROCEDURE — 99213 OFFICE O/P EST LOW 20 MIN: CPT | Performed by: PODIATRIST

## 2022-04-11 RX ORDER — OMEPRAZOLE 40 MG/1
40 CAPSULE, DELAYED RELEASE ORAL
COMMUNITY
Start: 2022-01-19

## 2022-04-11 NOTE — PROGRESS NOTES
Assessment/Plan:    Discussed principles of diabetic foot care  Sensorium is intact but there are no palpable pedal pulses  Patient knows to refrain from walking barefoot  Treatment consisted of nail and lesion trimming    No problem-specific Assessment & Plan notes found for this encounter  Diagnoses and all orders for this visit:    Peripheral vascular disease, unspecified ([facility] )    Controlled type 2 diabetes mellitus without complication, without long-term current use of insulin ([facility] )    Other orders  -     omeprazole (PriLOSEC) 40 MG capsule; Take 40 mg by mouth daily before breakfast          Subjective:      Patient ID: Edward Ricci is a 80 y o  female  HPI     Patient, a 80-year-old type 2 diabetic female presents for yearly diabetic foot exam and care  Patient denies foot disorders  She has long toenails that she cannot cut and a corn on the right 2nd toe  She denies significant numbness or foot pain  I personally reviewed A1c dated 01/20/2022  It was 7 5  I personally reviewed A1c dated 08/16/2021  It was 7 8  The following portions of the patient's history were reviewed and updated as appropriate: allergies, current medications, past family history, past medical history, past social history, past surgical history and problem list     Review of Systems   Respiratory:        COPD   Genitourinary:        Chronic renal disease   Musculoskeletal: Positive for back pain  Neurological:        Lumbar radiculopathy         Objective:      /78   Pulse (!) 108   Ht 5' 1" (1 549 m)   Wt 78 7 kg (173 lb 6 4 oz)   BMI 32 76 kg/m²          Physical Exam  Cardiovascular:      Pulses: Pulses are weak  Dorsalis pedis pulses are 0 on the right side and 0 on the left side  Posterior tibial pulses are 0 on the right side and 0 on the left side  Feet:      Right foot:      Skin integrity: Callus present  No ulcer, skin breakdown, erythema, warmth or dry skin        Left foot:      Skin integrity: No ulcer, skin breakdown, erythema, warmth, callus or dry skin  Diabetic Foot Exam    Patient's shoes and socks removed  Right Foot/Ankle   Right Foot Inspection  Skin Exam: skin normal, skin intact, callus and callus  No dry skin, no warmth, no erythema, no maceration, no abnormal color, no pre-ulcer and no ulcer  Toe Exam: ROM and strength within normal limits  Sensory   Vibration: intact  Proprioception: intact  Monofilament testing: intact    Vascular  Capillary refills: elevated  The right DP pulse is 0  The right PT pulse is 0  Right Toe  - Comprehensive Exam  Ecchymosis: none  Arch: normal  Hammertoes: second toe  Claw Toes: absent  Swelling: none   Tenderness: none         Left Foot/Ankle  Left Foot Inspection  Skin Exam: skin normal and skin intact  No dry skin, no warmth, no erythema, no maceration, normal color, no pre-ulcer, no ulcer and no callus  Toe Exam: ROM and strength within normal limits  Sensory   Vibration: intact  Proprioception: intact  Monofilament testing: intact    Vascular  Capillary refills: elevated  The left DP pulse is 0  The left PT pulse is 0       Left Toe  - Comprehensive Exam  Ecchymosis: none  Arch: normal  Hammertoes: absent  Claw toes: absent  Swelling: none   Tenderness: none           Assign Risk Category  Deformity present  No loss of protective sensation  Weak pulses  Risk: 1

## 2022-04-20 ENCOUNTER — OFFICE VISIT (OUTPATIENT)
Dept: PULMONOLOGY | Facility: CLINIC | Age: 87
End: 2022-04-20
Payer: MEDICARE

## 2022-04-20 VITALS
WEIGHT: 169.8 LBS | TEMPERATURE: 98.7 F | DIASTOLIC BLOOD PRESSURE: 62 MMHG | RESPIRATION RATE: 16 BRPM | HEART RATE: 91 BPM | BODY MASS INDEX: 32.06 KG/M2 | HEIGHT: 61 IN | OXYGEN SATURATION: 96 % | SYSTOLIC BLOOD PRESSURE: 128 MMHG

## 2022-04-20 DIAGNOSIS — J44.9 COPD (CHRONIC OBSTRUCTIVE PULMONARY DISEASE) WITH CHRONIC BRONCHITIS (HCC): Primary | ICD-10-CM

## 2022-04-20 PROCEDURE — 99215 OFFICE O/P EST HI 40 MIN: CPT | Performed by: INTERNAL MEDICINE

## 2022-04-20 RX ORDER — BENZONATATE 200 MG/1
200 CAPSULE ORAL 3 TIMES DAILY PRN
Qty: 30 CAPSULE | Refills: 3 | Status: SHIPPED | OUTPATIENT
Start: 2022-04-20

## 2022-04-20 RX ORDER — LORATADINE 10 MG/1
10 TABLET ORAL DAILY
COMMUNITY

## 2022-04-20 RX ORDER — IPRATROPIUM BROMIDE 42 UG/1
2 SPRAY, METERED NASAL 2 TIMES DAILY
Qty: 15 ML | Refills: 3 | Status: SHIPPED | OUTPATIENT
Start: 2022-04-20

## 2022-04-20 NOTE — PROGRESS NOTES
Assessment/Plan:    COPD (chronic obstructive pulmonary disease) with chronic bronchitis [facility])  Ms Clint Phillip is complaining of occasional cough and increased cough with chest congestion but sounds clear on todays exam   I have ordered a CXR today and will continue trelegy  I encouraged her to use the Budesonide and Albuterol twice a day  I have added Atrovent nasal spray 2 sprays twice a day and added tessalon perles three times a day as needed       Diagnoses and all orders for this visit:    COPD (chronic obstructive pulmonary disease) with chronic bronchitis (HCC)  -     ipratropium (ATROVENT) 0 06 % nasal spray; 2 sprays into each nostril 2 (two) times a day  -     benzonatate (TESSALON) 200 MG capsule; Take 1 capsule (200 mg total) by mouth 3 (three) times a day as needed for cough  -     XR chest pa & lateral; Future    Other orders  -     loratadine (CLARITIN) 10 mg tablet; Take 10 mg by mouth daily          Subjective:      Patient ID: Orlando Lucas is a 80 y o  female  Ms Nayeli Romero complains of cough which is loose but unable to expectorate  She is occasionally wheezing and short of breath  She takes her Trelegy everyday but does not use nebulizers with regularity      The following portions of the patient's history were reviewed and updated as appropriate: allergies, current medications, past family history, past medical history, past social history, past surgical history and problem list     Review of Systems   Constitutional: Negative  Negative for unexpected weight change  HENT: Negative  Negative for postnasal drip  Eyes: Negative  Respiratory: Positive for cough and shortness of breath  Negative for wheezing  Cardiovascular: Negative  Negative for chest pain and leg swelling  Gastrointestinal: Negative  Endocrine: Negative  Genitourinary: Negative  Musculoskeletal: Negative  Allergic/Immunologic: Negative  Neurological: Negative  Hematological: Negative  Objective:      /62 (BP Location: Left arm, Patient Position: Sitting, Cuff Size: Adult)   Pulse 91   Temp 98 7 °F (37 1 °C) (Tympanic)   Resp 16   Ht 5' 1" (1 549 m)   Wt 77 kg (169 lb 12 8 oz)   SpO2 96%   BMI 32 08 kg/m²          Physical Exam  Constitutional:       Appearance: She is well-developed  HENT:      Head: Normocephalic  Eyes:      Pupils: Pupils are equal, round, and reactive to light  Cardiovascular:      Rate and Rhythm: Normal rate  Heart sounds: No murmur heard  Pulmonary:      Effort: Pulmonary effort is normal  No respiratory distress  Breath sounds: Normal breath sounds  No wheezing or rales  Abdominal:      Palpations: Abdomen is soft  Musculoskeletal:         General: Normal range of motion  Cervical back: Normal range of motion and neck supple  Skin:     General: Skin is warm and dry  Neurological:      Mental Status: She is alert and oriented to person, place, and time

## 2022-04-20 NOTE — PATIENT INSTRUCTIONS
Continue Trelegy  Use budesonide and Albuterol in nebulizer twice a day  Add Atrovent nasal spray 2 sprays each nostril twice a day  Use tessalon perles for cough up to three times daily

## 2022-04-20 NOTE — ASSESSMENT & PLAN NOTE
Ms Tanner Shearer is complaining of occasional cough and increased cough with chest congestion but sounds clear on todays exam   I have ordered a CXR today and will continue trelegy  I encouraged her to use the Budesonide and Albuterol twice a day    I have added Atrovent nasal spray 2 sprays twice a day and added tessalon perles three times a day as needed

## 2022-05-05 ENCOUNTER — HOSPITAL ENCOUNTER (OUTPATIENT)
Dept: RADIOLOGY | Facility: HOSPITAL | Age: 87
Discharge: HOME | End: 2022-05-05
Attending: INTERNAL MEDICINE
Payer: MEDICARE

## 2022-05-05 DIAGNOSIS — J44.9 COPD (CHRONIC OBSTRUCTIVE PULMONARY DISEASE) WITH CHRONIC BRONCHITIS (HCC): ICD-10-CM

## 2022-05-05 PROCEDURE — 71046 X-RAY EXAM CHEST 2 VIEWS: CPT

## 2022-05-15 NOTE — PLAN OF CARE
DISCHARGE PLANNING     Discharge to home or other facility with appropriate resources Progressing        INFECTION - ADULT     Absence or prevention of progression during hospitalization Progressing        Knowledge Deficit     Patient/family/caregiver demonstrates understanding of disease process, treatment plan, medications, and discharge instructions Progressing        PAIN - ADULT     Verbalizes/displays adequate comfort level or baseline comfort level Progressing        Potential for Falls     Patient will remain free of falls Progressing        SAFETY ADULT     Maintain or return to baseline ADL function Progressing     Maintain or return mobility status to optimal level Progressing asthma

## 2022-07-11 ENCOUNTER — OFFICE VISIT (OUTPATIENT)
Dept: PODIATRY | Facility: CLINIC | Age: 87
End: 2022-07-11
Payer: MEDICARE

## 2022-07-11 VITALS
HEIGHT: 61 IN | SYSTOLIC BLOOD PRESSURE: 145 MMHG | DIASTOLIC BLOOD PRESSURE: 76 MMHG | WEIGHT: 169 LBS | HEART RATE: 93 BPM | BODY MASS INDEX: 31.91 KG/M2

## 2022-07-11 DIAGNOSIS — L84 CORNS: ICD-10-CM

## 2022-07-11 DIAGNOSIS — E11.9 CONTROLLED TYPE 2 DIABETES MELLITUS WITHOUT COMPLICATION, WITHOUT LONG-TERM CURRENT USE OF INSULIN (HCC): Primary | ICD-10-CM

## 2022-07-11 PROCEDURE — 11719 TRIM NAIL(S) ANY NUMBER: CPT | Performed by: PODIATRIST

## 2022-07-11 PROCEDURE — 11055 PARING/CUTG B9 HYPRKER LES 1: CPT | Performed by: PODIATRIST

## 2022-07-11 NOTE — PROGRESS NOTES
Established patient with class findings presents for nail and lesion care  Vascular exam:  DP  0/4 bilateral; PT  0 4 bilateral   Dermatological exam:  Each toenail is thick and  Dystrophic  Soft corn medial aspect right 2nd toe  Diagnosis:  Diabetes mellitus; hyperkeratotic lesion right 2nd toe  Treatment: Trimmed multiple dystrophic toenails  Trimmed hyperkeratotic lesion right 2nd toe      Nail removal    Date/Time: 7/11/2022 2:53 PM  Performed by: Annetta Barragan DPM  Authorized by: Annetta Barragan DPM     Nails trimmed:     Number of nails trimmed:  10  Lesion Destruction    Date/Time: 7/11/2022 2:53 PM  Performed by: Annetta Barragan DPM  Authorized by: Annetta Barragan DPM     Procedure Details - Lesion Destruction:     Number of Lesions:  1  Lesion 1:     Body area:  Lower extremity    Lower extremity location:  R second toe    Malignancy: benign hyperkeratotic lesion      Destruction method: scissors used for extraction

## 2022-08-05 ENCOUNTER — APPOINTMENT (OUTPATIENT)
Dept: LAB | Facility: CLINIC | Age: 87
End: 2022-08-05
Payer: MEDICARE

## 2022-08-05 DIAGNOSIS — E03.9 MYXEDEMA HEART DISEASE: ICD-10-CM

## 2022-08-05 DIAGNOSIS — M85.89 DISAPPEARING BONE DISEASE: ICD-10-CM

## 2022-08-05 DIAGNOSIS — E11.00 TYPE II DIABETES MELLITUS WITH HYPEROSMOLARITY, UNCONTROLLED (HCC): ICD-10-CM

## 2022-08-05 DIAGNOSIS — I51.9 MYXEDEMA HEART DISEASE: ICD-10-CM

## 2022-08-05 DIAGNOSIS — E11.65 TYPE II DIABETES MELLITUS WITH HYPEROSMOLARITY, UNCONTROLLED (HCC): ICD-10-CM

## 2022-08-05 DIAGNOSIS — I10 ESSENTIAL HYPERTENSION, MALIGNANT: ICD-10-CM

## 2022-08-05 DIAGNOSIS — J44.9 OBSTRUCTIVE CHRONIC BRONCHITIS WITHOUT EXACERBATION (HCC): ICD-10-CM

## 2022-08-05 LAB
ALBUMIN SERPL BCP-MCNC: 3.8 G/DL (ref 3.5–5)
ALP SERPL-CCNC: 74 U/L (ref 34–104)
ALT SERPL W P-5'-P-CCNC: 10 U/L (ref 7–52)
ANION GAP SERPL CALCULATED.3IONS-SCNC: 5 MMOL/L (ref 4–13)
AST SERPL W P-5'-P-CCNC: 15 U/L (ref 13–39)
BASOPHILS # BLD AUTO: 0.07 THOUSANDS/ΜL (ref 0–0.1)
BASOPHILS NFR BLD AUTO: 1 % (ref 0–1)
BILIRUB SERPL-MCNC: 0.49 MG/DL (ref 0.2–1)
BUN SERPL-MCNC: 36 MG/DL (ref 5–25)
CALCIUM SERPL-MCNC: 9.6 MG/DL (ref 8.4–10.2)
CHLORIDE SERPL-SCNC: 103 MMOL/L (ref 96–108)
CHOLEST SERPL-MCNC: 130 MG/DL
CO2 SERPL-SCNC: 30 MMOL/L (ref 21–32)
CREAT SERPL-MCNC: 2 MG/DL (ref 0.6–1.3)
EOSINOPHIL # BLD AUTO: 0.45 THOUSAND/ΜL (ref 0–0.61)
EOSINOPHIL NFR BLD AUTO: 6 % (ref 0–6)
ERYTHROCYTE [DISTWIDTH] IN BLOOD BY AUTOMATED COUNT: 13.9 % (ref 11.6–15.1)
EST. AVERAGE GLUCOSE BLD GHB EST-MCNC: 169 MG/DL
GFR SERPL CREATININE-BSD FRML MDRD: 21 ML/MIN/1.73SQ M
GLUCOSE P FAST SERPL-MCNC: 92 MG/DL (ref 65–99)
HBA1C MFR BLD: 7.5 %
HCT VFR BLD AUTO: 44.9 % (ref 34.8–46.1)
HDLC SERPL-MCNC: 56 MG/DL
HGB BLD-MCNC: 14.5 G/DL (ref 11.5–15.4)
IMM GRANULOCYTES # BLD AUTO: 0.03 THOUSAND/UL (ref 0–0.2)
IMM GRANULOCYTES NFR BLD AUTO: 0 % (ref 0–2)
LDLC SERPL CALC-MCNC: 50 MG/DL (ref 0–100)
LDLC SERPL DIRECT ASSAY-MCNC: 43 MG/DL (ref 0–100)
LYMPHOCYTES # BLD AUTO: 1.88 THOUSANDS/ΜL (ref 0.6–4.47)
LYMPHOCYTES NFR BLD AUTO: 25 % (ref 14–44)
MCH RBC QN AUTO: 27.8 PG (ref 26.8–34.3)
MCHC RBC AUTO-ENTMCNC: 32.3 G/DL (ref 31.4–37.4)
MCV RBC AUTO: 86 FL (ref 82–98)
MONOCYTES # BLD AUTO: 0.57 THOUSAND/ΜL (ref 0.17–1.22)
MONOCYTES NFR BLD AUTO: 8 % (ref 4–12)
NEUTROPHILS # BLD AUTO: 4.48 THOUSANDS/ΜL (ref 1.85–7.62)
NEUTS SEG NFR BLD AUTO: 60 % (ref 43–75)
NONHDLC SERPL-MCNC: 74 MG/DL
NRBC BLD AUTO-RTO: 0 /100 WBCS
PLATELET # BLD AUTO: 197 THOUSANDS/UL (ref 149–390)
PMV BLD AUTO: 10.2 FL (ref 8.9–12.7)
POTASSIUM SERPL-SCNC: 4.4 MMOL/L (ref 3.5–5.3)
PROT SERPL-MCNC: 7.6 G/DL (ref 6.4–8.4)
RBC # BLD AUTO: 5.22 MILLION/UL (ref 3.81–5.12)
SODIUM SERPL-SCNC: 138 MMOL/L (ref 135–147)
TRIGL SERPL-MCNC: 119 MG/DL
URATE SERPL-MCNC: 7 MG/DL (ref 2–7.5)
WBC # BLD AUTO: 7.48 THOUSAND/UL (ref 4.31–10.16)

## 2022-08-05 PROCEDURE — 80061 LIPID PANEL: CPT

## 2022-08-05 PROCEDURE — 84550 ASSAY OF BLOOD/URIC ACID: CPT

## 2022-08-05 PROCEDURE — 36415 COLL VENOUS BLD VENIPUNCTURE: CPT

## 2022-08-05 PROCEDURE — 85025 COMPLETE CBC W/AUTO DIFF WBC: CPT

## 2022-08-05 PROCEDURE — 80053 COMPREHEN METABOLIC PANEL: CPT

## 2022-08-05 PROCEDURE — 83036 HEMOGLOBIN GLYCOSYLATED A1C: CPT

## 2022-08-05 PROCEDURE — 84443 ASSAY THYROID STIM HORMONE: CPT

## 2022-08-05 PROCEDURE — 83721 ASSAY OF BLOOD LIPOPROTEIN: CPT

## 2022-08-10 LAB — MISCELLANEOUS LAB TEST RESULT: NORMAL

## 2022-09-19 ENCOUNTER — TELEPHONE (OUTPATIENT)
Dept: PULMONOLOGY | Facility: CLINIC | Age: 87
End: 2022-09-19

## 2022-09-19 DIAGNOSIS — J44.9 COPD (CHRONIC OBSTRUCTIVE PULMONARY DISEASE) WITH CHRONIC BRONCHITIS (HCC): Primary | ICD-10-CM

## 2022-09-19 RX ORDER — PREDNISONE 20 MG/1
40 TABLET ORAL DAILY
Qty: 10 TABLET | Refills: 0 | Status: SHIPPED | OUTPATIENT
Start: 2022-09-19

## 2022-09-19 NOTE — TELEPHONE ENCOUNTER
Patient's daughter calling stating pt is not feeling well and is asking for prednisone   Please advise [phone]

## 2022-09-19 NOTE — TELEPHONE ENCOUNTER
Mamta Ruiz would like a return call regarding     What is the reason for the call/chief complaint? Sob and cough with a lot of phlegm      What additional symptoms are present or absent? SOB, yes   Are they on O2? No Pulse O2 restingna When walkingna   chest pain/tightness, yes  wheezing, yes  Cough, yes  mucous production,yes  What color is it clear/ white   fevers/chills, yes  weight gain, no  Swelling no  Pain no, does it hurt when breathing or all the time? na    When did they start/how long have they been going on? Few weeks but last 2 has been worst    Constant or intermittent; if intermittent describe yes? What makes it better or worse?neb and rescue help some     Have you been exposed to anyone that is sick? No    Have you been tested for COVID recently? Yes and negative     Check current pulmonary medications trelegy   frequency of use daily    Have they had any other treatment or testing for this problem elsewhere? no    Recent steroids? No    Recent Antibiotics? No     Last office visit? 4/20/22    Patient pharmacy?  [facility]     30 or 90 day supply 30    Would like to know if she can be prescribed some prednisone to help

## 2022-09-29 ENCOUNTER — HOSPITAL ENCOUNTER (OUTPATIENT)
Dept: VASCULAR ULTRASOUND | Facility: HOSPITAL | Age: 87
Discharge: HOME | End: 2022-09-29
Payer: MEDICARE

## 2022-09-29 DIAGNOSIS — I65.23 OCCLUSION AND STENOSIS OF BILATERAL CAROTID ARTERIES: ICD-10-CM

## 2022-09-29 PROCEDURE — 93880 EXTRACRANIAL BILAT STUDY: CPT | Performed by: SURGERY

## 2022-09-29 PROCEDURE — 93880 EXTRACRANIAL BILAT STUDY: CPT

## 2022-10-18 DIAGNOSIS — J44.9 COPD (CHRONIC OBSTRUCTIVE PULMONARY DISEASE) WITH CHRONIC BRONCHITIS (HCC): ICD-10-CM

## 2022-10-18 RX ORDER — LEVALBUTEROL INHALATION SOLUTION 0.63 MG/3ML
0.63 SOLUTION RESPIRATORY (INHALATION) 3 TIMES DAILY
Qty: 180 ML | Refills: 3 | Status: SHIPPED | OUTPATIENT
Start: 2022-10-18

## 2022-10-18 NOTE — TELEPHONE ENCOUNTER
Called pt and LM informing her that levalbuterol has ordered and can be picked up at the [facility] pharmacy in [address] [place] [address]

## 2022-10-21 ENCOUNTER — TELEPHONE (OUTPATIENT)
Dept: PULMONOLOGY | Facility: CLINIC | Age: 87
End: 2022-10-21

## 2022-10-21 NOTE — TELEPHONE ENCOUNTER
Spoke with patient daughter she stated levalbuterol was sent in but not the albuterol solution  Did not see on file last time filled was probably a year ago  They would like it sent to [facility]  Please advise

## 2022-10-21 NOTE — TELEPHONE ENCOUNTER
Levalbuterol is the same medication class as albuterol-she should not be on both-just continue levalbuterol

## 2022-11-09 ENCOUNTER — OFFICE VISIT (OUTPATIENT)
Dept: PULMONOLOGY | Facility: CLINIC | Age: 87
End: 2022-11-09

## 2022-11-09 VITALS
WEIGHT: 166 LBS | HEART RATE: 103 BPM | TEMPERATURE: 97 F | OXYGEN SATURATION: 96 % | BODY MASS INDEX: 31.34 KG/M2 | DIASTOLIC BLOOD PRESSURE: 74 MMHG | SYSTOLIC BLOOD PRESSURE: 136 MMHG | HEIGHT: 61 IN | RESPIRATION RATE: 18 BRPM

## 2022-11-09 DIAGNOSIS — J44.9 COPD (CHRONIC OBSTRUCTIVE PULMONARY DISEASE) WITH CHRONIC BRONCHITIS (HCC): ICD-10-CM

## 2022-11-09 DIAGNOSIS — R05.3 CHRONIC COUGH: Primary | ICD-10-CM

## 2022-11-09 NOTE — ASSESSMENT & PLAN NOTE
Patient and daughter complain of chronic cough since May 2022  She has been using tessalon perles and robitussin DM prn with some relief  On further discussion, daughter states patient has coughing/choking episodes while eating  Will order SLP evaluation   Counseled on proper aspiration precautions

## 2022-11-09 NOTE — ASSESSMENT & PLAN NOTE
Still with complaints of cough, cough syrup helps  Daughter complains that patient has had coughing and choking with food  Will order SLP evaluation  Otherwise continue current bronchodilators

## 2022-11-09 NOTE — PROGRESS NOTES
Pulmonary Follow Up Note   Antoinette Lazar 80 y o  female MRN: [id number]  11/9/2022      Assessment:    COPD (chronic obstructive pulmonary disease) with chronic bronchitis ([address] )  Still with complaints of cough, cough syrup helps  Daughter complains that patient has had coughing and choking with food  Will order SLP evaluation  Otherwise continue current bronchodilators  Chronic cough  Patient and daughter complain of chronic cough since May 2022  She has been using tessalon perles and robitussin DM prn with some relief  On further discussion, daughter states patient has coughing/choking episodes while eating  Will order SLP evaluation  Counseled on proper aspiration precautions      Plan:    Problem List Items Addressed This Visit        Respiratory    COPD (chronic obstructive pulmonary disease) with chronic bronchitis ([address] )     Still with complaints of cough, cough syrup helps  Daughter complains that patient has had coughing and choking with food  Will order SLP evaluation  Otherwise continue current bronchodilators  Other    Chronic cough - Primary     Patient and daughter complain of chronic cough since May 2022  She has been using tessalon perles and robitussin DM prn with some relief  On further discussion, daughter states patient has coughing/choking episodes while eating  Will order SLP evaluation  Counseled on proper aspiration precautions         Relevant Orders    Ambulatory Referral to Speech Therapy          Return in about 3 months (around 2/9/2023) for Next scheduled follow up  History of Present Illness   HPI:  Antoinette Lazar is a 80 y o  female who presents for sick visit  Patient has had persistent cough since May of this year has had steroid course with short improvement in her symptoms  Has been using tessalon perles and robitussin syrup with intermittent relief  Daughter states she may not be using inhaler correctly and patient has been hesitant to use nebulizers   She has been choking with her food as well, she doesn't use her dentures all the time while eating either  Review of Systems   Constitutional: Negative for activity change, chills, diaphoresis, fatigue and fever  HENT: Negative for congestion, postnasal drip, rhinorrhea, sinus pressure, sneezing, sore throat and trouble swallowing  Eyes: Negative  Respiratory: Positive for cough and choking  Negative for chest tightness and shortness of breath  Cardiovascular: Negative for chest pain, palpitations and leg swelling  Gastrointestinal: Negative for constipation, diarrhea, nausea and vomiting  Endocrine: Negative  Genitourinary: Negative  Musculoskeletal: Negative for back pain, joint swelling and neck pain  Skin: Negative  Allergic/Immunologic: Negative  Neurological: Negative for dizziness, syncope, weakness, light-headedness and headaches  Hematological: Negative  Psychiatric/Behavioral: Negative  All other systems reviewed and are negative        Historical Information   Past Medical History:   Diagnosis Date   • Arthritis    • Asthma    • COPD (chronic obstructive pulmonary disease) ([place] [address] )    • Diabetes mellitus ([place] [address] )    • Difficulty walking    • Gout    • Hyperlipidemia    • Hypertension    • Hypothyroidism    • Neuropathy in diabetes ([place] [address] )    • Overactive bladder    • PVD (peripheral vascular disease) ([place] [address] )    • Verruca      Past Surgical History:   Procedure Laterality Date   • ARTERIAL BYPASS SURGERY     • CAROTID ENDARTERECTOMY Right    • CATARACT EXTRACTION     • HYSTERECTOMY     • NEPHRECTOMY      in her 25s     Family History   Problem Relation Age of Onset   • Asthma Mother    • Asthma Father    • Diabetes Father    • Arthritis Father    • Hypertension Father    • Lung cancer Sister    • Early death Brother 39   • COPD Daughter    • Asthma Daughter    • Rheum arthritis Daughter    • Cataracts Daughter    • Liver cancer Son    • Alzheimer's disease Sister    • Rectal cancer Sister    • Thyroid cancer Sister    • No Known Problems Sister    • Asthma Son          Meds/Allergies     Current Outpatient Medications:   •  albuterol (PROVENTIL HFA,VENTOLIN HFA) 90 mcg/act inhaler, Inhale 2 puffs every 4 (four) hours as needed, Disp: , Rfl: 0  •  aspirin (ECOTRIN LOW STRENGTH) 81 mg EC tablet, Take 81 mg by mouth daily, Disp: , Rfl:   •  atorvastatin (LIPITOR) 40 mg tablet, Take 40 mg by mouth daily, Disp: , Rfl:   •  BD PEN NEEDLE SKYLER U/F 32G X 4 MM MISC, 1 SYRINGE BY MISCELLANEOUS ROUTE DAILY, Disp: , Rfl: 0  •  benzonatate (TESSALON) 200 MG capsule, Take 1 capsule (200 mg total) by mouth 3 (three) times a day as needed for cough, Disp: 30 capsule, Rfl: 3  •  budesonide (PULMICORT) 0 5 mg/2 mL nebulizer solution, Take 1 vial (0 5 mg total) by nebulization 2 (two) times a day Rinse mouth after use , Disp: 120 mL, Rfl: 5  •  Calcium Carb-Cholecalciferol (CALTRATE 600+D3 SOFT PO), Take 2 tablets by mouth, Disp: , Rfl:   •  COLCRYS 0 6 MG tablet, Take 0 6 mg by mouth 2 (two) times a day, Disp: , Rfl: 0  •  docusate sodium (COLACE) 100 mg capsule, Take 100 mg by mouth 2 (two) times a day, Disp: , Rfl:   •  Dulaglutide 1 5 MG/0 5ML SOPN, Inject 1 5 mg under the skin once a week, Disp: , Rfl:   •  fluticasone (FLONASE) 50 mcg/act nasal spray, 1 spray into each nostril, Disp: , Rfl:   •  fluticasone-umeclidinium-vilanterol (TRELEGY) 100-62 5-25 MCG/INH inhaler, Inhale 1 puff daily Rinse mouth after use , Disp: 1 Inhaler, Rfl: 5  •  hydrochlorothiazide (HYDRODIURIL) 25 mg tablet, , Disp: , Rfl:   •  insulin glargine (LANTUS SOLOSTAR) 100 units/mL injection pen, Inject 20 Units under the skin daily, Disp: , Rfl:   •  ipratropium (ATROVENT) 0 06 % nasal spray, 2 sprays into each nostril 2 (two) times a day, Disp: 15 mL, Rfl: 3  •  JARDIANCE 10 MG TABS, Take 10 mg by mouth daily, Disp: , Rfl: 0  •  levalbuterol (XOPENEX) 0 63 mg/3 mL nebulizer solution, Take 3 mL (0 63 mg total) by nebulization 3 (three) times a day, Disp: 180 mL, Rfl: 3  •  levothyroxine 100 mcg tablet, Take 100 mcg by mouth daily, Disp: , Rfl:   •  loratadine (CLARITIN) 10 mg tablet, Take 10 mg by mouth daily, Disp: , Rfl:   •  ONE TOUCH ULTRA TEST test strip, USE FOR BLOOD GLUCOSE TESTING AS DIRECTED TWICE DAILY, Disp: , Rfl: 1  •  ONETOUCH DELICA LANCETS 02X MISC, 1 SYRINGE BY MISCELLANEOUS ROUTE 2 TIMES A DAY AS NEEDED DX E11 9 LAST O/V 10/20/18, Disp: , Rfl: 1  •  oxybutynin (DITROPAN-XL) 10 MG 24 hr tablet, Take 10 mg by mouth daily, Disp: , Rfl: 0  •  triamcinolone (KENALOG) 0 1 % ointment, Apply 1 application topically 2 (two) times a day To affected area, Disp: , Rfl: 1  •  Trulicity 3 98 ZI/3 8IG SOPN, , Disp: , Rfl:   •  valsartan (DIOVAN) 320 MG tablet, , Disp: , Rfl:   •  clobetasol (TEMOVATE) 0 05 % ointment, APPLY TOPICALLY 2 (TWO) TIMES A DAY APPLY NIGHTLY FOR 1 WEEK, THEN EVERY OTHER NIGHT (Patient not taking: No sig reported), Disp: , Rfl:   •  fluconazole (DIFLUCAN) 150 mg tablet, TAKE 1 TABLET BY MOUTH AS ONE DOSE (Patient not taking: No sig reported), Disp: , Rfl: 0  •  omeprazole (PriLOSEC) 40 MG capsule, Take 40 mg by mouth daily before breakfast (Patient not taking: No sig reported), Disp: , Rfl:   Allergies   Allergen Reactions   • Ace Inhibitors      Other reaction(s): cough   • Metformin      Other reaction(s): severe constipation   • Penicillins Hives   • Sulfa Antibiotics      Unknown reaction       Vitals: Blood pressure 136/74, pulse 103, temperature (!) 97 °F (36 1 °C), temperature source Tympanic, resp  rate 18, height 5' 1" (1 549 m), weight 75 3 kg (166 lb), SpO2 96 %, not currently breastfeeding  Body mass index is 31 37 kg/m²  Oxygen Therapy  SpO2: 96 %  Oxygen Therapy: None (Room air)      Physical Exam  Physical Exam  Vitals and nursing note reviewed  Constitutional:       Appearance: Normal appearance  She is obese  HENT:      Head: Normocephalic and atraumatic        Right Ear: External ear normal       Left Ear: External ear normal       Nose: Nose normal       Mouth/Throat:      Mouth: Mucous membranes are moist       Pharynx: Oropharynx is clear  Eyes:      Conjunctiva/sclera: Conjunctivae normal    Cardiovascular:      Rate and Rhythm: Normal rate and regular rhythm  Pulses: Normal pulses  Heart sounds: Normal heart sounds  Pulmonary:      Effort: Pulmonary effort is normal       Comments: Diminished breath sounds throughout  Abdominal:      General: Abdomen is flat  Bowel sounds are normal       Palpations: Abdomen is soft  Musculoskeletal:         General: No swelling or tenderness  Cervical back: Neck supple  No muscular tenderness  Skin:     General: Skin is warm and dry  Capillary Refill: Capillary refill takes less than 2 seconds  Neurological:      Mental Status: She is alert and oriented to person, place, and time  Mental status is at baseline  Psychiatric:         Mood and Affect: Mood normal          Behavior: Behavior normal          Thought Content: Thought content normal          Judgment: Judgment normal          Labs: I have personally reviewed pertinent lab results  Lab Results   Component Value Date    WBC 7 48 08/05/2022    HGB 14 5 08/05/2022    HCT 44 9 08/05/2022    MCV 86 08/05/2022     08/05/2022     Lab Results   Component Value Date    CALCIUM 9 6 08/05/2022    K 4 4 08/05/2022    CO2 30 08/05/2022     08/05/2022    BUN 36 (H) 08/05/2022    CREATININE 2 00 (H) 08/05/2022     No results found for: IGE  Lab Results   Component Value Date    ALT 10 08/05/2022    AST 15 08/05/2022    ALKPHOS 74 08/05/2022         Imaging and other studies: I have personally reviewed pertinent films in PACS    CXR 05/05/2022: no acute findings    EKG, Pathology, and Other Studies: I have personally reviewed pertinent reports          Radha Cordova MD  Pulmonary/Critical Care Fellow PGY-IV  [facility]

## 2022-11-14 ENCOUNTER — OFFICE VISIT (OUTPATIENT)
Dept: PODIATRY | Facility: CLINIC | Age: 87
End: 2022-11-14

## 2022-11-14 VITALS
HEIGHT: 61 IN | BODY MASS INDEX: 31.34 KG/M2 | SYSTOLIC BLOOD PRESSURE: 138 MMHG | DIASTOLIC BLOOD PRESSURE: 76 MMHG | HEART RATE: 96 BPM | WEIGHT: 166 LBS

## 2022-11-14 DIAGNOSIS — L84 CORNS: ICD-10-CM

## 2022-11-14 DIAGNOSIS — E11.9 CONTROLLED TYPE 2 DIABETES MELLITUS WITHOUT COMPLICATION, WITHOUT LONG-TERM CURRENT USE OF INSULIN (HCC): Primary | ICD-10-CM

## 2022-11-14 NOTE — PROGRESS NOTES
Established patient with class findings presents for nail and lesion care  Patient notes that she is having difficulty with cramps in her legs at night  Vascular exam:  DP  0/4 bilateral; PT  0 4 bilateral   Dermatological exam:  Each toenail is thick and  Dystrophic  Soft corn medial aspect right 2nd toe  Diagnosis:  Diabetes mellitus; hyperkeratotic lesion right 2nd toe  Treatment: Trimmed multiple dystrophic toenails  Trimmed hyperkeratotic lesion right 2nd toe  Nocturnal leg cramping likely due to PVD  Patient told to drape feet over the edge of the bed  Also recommended Hylands p m  Cramp tablets      Nail removal    Date/Time: 11/14/2022 1:07 PM  Performed by: Noni Hardy DPM  Authorized by: Noni Hardy DPM     Nails trimmed:     Number of nails trimmed:  10  Lesion Destruction    Date/Time: 11/14/2022 1:07 PM  Performed by: Noni Hardy DPM  Authorized by: Noni Hardy DPM     Procedure Details - Lesion Destruction:     Number of Lesions:  1  Lesion 1:     Body area:  Lower extremity    Lower extremity location:  R second toe    Malignancy: benign hyperkeratotic lesion      Destruction method: scissors used for extraction

## 2023-03-19 DIAGNOSIS — J44.9 COPD (CHRONIC OBSTRUCTIVE PULMONARY DISEASE) WITH CHRONIC BRONCHITIS (HCC): ICD-10-CM

## 2023-03-20 RX ORDER — BENZONATATE 200 MG/1
CAPSULE ORAL
Qty: 30 CAPSULE | Refills: 3 | Status: SHIPPED | OUTPATIENT
Start: 2023-03-20

## 2023-03-30 ENCOUNTER — OFFICE VISIT (OUTPATIENT)
Dept: PODIATRY | Facility: CLINIC | Age: 88
End: 2023-03-30

## 2023-03-30 VITALS
HEIGHT: 61 IN | WEIGHT: 173 LBS | SYSTOLIC BLOOD PRESSURE: 125 MMHG | DIASTOLIC BLOOD PRESSURE: 53 MMHG | HEART RATE: 68 BPM | BODY MASS INDEX: 32.66 KG/M2

## 2023-03-30 DIAGNOSIS — E11.9 CONTROLLED TYPE 2 DIABETES MELLITUS WITHOUT COMPLICATION, WITHOUT LONG-TERM CURRENT USE OF INSULIN (HCC): Primary | ICD-10-CM

## 2023-03-30 NOTE — PROGRESS NOTES
"Assessment/Plan:    Discussed principles of diabetic foot care  There are no palpable pedal pulses but sensorium is intact  Patient knows to refrain from walking barefoot  All elongated toenails were trimmed  She will be reassessed in 4 months  No problem-specific Assessment & Plan notes found for this encounter  Diagnoses and all orders for this visit:    Controlled type 2 diabetes mellitus without complication, without long-term current use of insulin (HCC)          Subjective:      Patient ID: Penelope Mendez is a 80 y o  female  HPI     Patient, a 28-year-old type II diabetic female presents for her yearly diabetic foot exam and care  Patient relates a numb sensation in her feet  She also has long toenails that she cannot cut  I personally reviewed an A1c dated 8/5/2010  It was 7 5  I personally reviewed an A1c dated 1/20/2022  It was 7 5  The following portions of the patient's history were reviewed and updated as appropriate: allergies, current medications, past family history, past medical history, past social history, past surgical history and problem list     Review of Systems   Respiratory: Positive for shortness of breath  Neurological: Positive for numbness  Objective:      /53   Pulse 68   Ht 5' 1\" (1 549 m)   Wt 78 5 kg (173 lb)   BMI 32 69 kg/m²          Physical Exam  Cardiovascular:      Pulses: Pulses are weak  Dorsalis pedis pulses are 0 on the right side and 0 on the left side  Posterior tibial pulses are 0 on the right side and 0 on the left side  Feet:      Right foot:      Skin integrity: No ulcer, skin breakdown, erythema, warmth, callus or dry skin  Left foot:      Skin integrity: No ulcer, skin breakdown, erythema, warmth, callus or dry skin  Diabetic Foot Exam    Patient's shoes and socks removed  Right Foot/Ankle   Right Foot Inspection  Skin Exam: skin normal and skin intact   No dry skin, no " warmth, no callus, no erythema, no maceration, no abnormal color, no pre-ulcer, no ulcer and no callus  Toe Exam: ROM and strength within normal limits  Sensory   Vibration: intact  Proprioception: intact  Monofilament testing: intact    Vascular  Capillary refills: elevated  The right DP pulse is 0  The right PT pulse is 0  Right Toe  - Comprehensive Exam  Ecchymosis: none  Arch: normal  Hammertoes: absent  Claw Toes: absent  Swelling: none   Tenderness: none         Left Foot/Ankle  Left Foot Inspection  Skin Exam: skin normal and skin intact  No dry skin, no warmth, no erythema, no maceration, normal color, no pre-ulcer, no ulcer and no callus  Toe Exam: ROM and strength within normal limits  Sensory   Vibration: intact  Proprioception: intact  Monofilament testing: intact    Vascular  Capillary refills: elevated  The left DP pulse is 0  The left PT pulse is 0       Left Toe  - Comprehensive Exam  Ecchymosis: none  Arch: normal  Hammertoes: absent  Claw toes: absent  Swelling: none   Tenderness: none           Assign Risk Category  No deformity present  No loss of protective sensation  Weak pulses  Risk: 1

## 2023-05-01 ENCOUNTER — OFFICE VISIT (OUTPATIENT)
Dept: PULMONOLOGY | Facility: CLINIC | Age: 88
End: 2023-05-01

## 2023-05-01 VITALS
HEIGHT: 61 IN | WEIGHT: 166 LBS | SYSTOLIC BLOOD PRESSURE: 110 MMHG | HEART RATE: 52 BPM | TEMPERATURE: 98.4 F | DIASTOLIC BLOOD PRESSURE: 70 MMHG | OXYGEN SATURATION: 99 % | BODY MASS INDEX: 31.34 KG/M2

## 2023-05-01 DIAGNOSIS — R05.3 CHRONIC COUGH: ICD-10-CM

## 2023-05-01 DIAGNOSIS — J44.9 CHRONIC OBSTRUCTIVE PULMONARY DISEASE, UNSPECIFIED COPD TYPE (HCC): ICD-10-CM

## 2023-05-01 DIAGNOSIS — J44.9 COPD (CHRONIC OBSTRUCTIVE PULMONARY DISEASE) WITH CHRONIC BRONCHITIS (HCC): Primary | ICD-10-CM

## 2023-05-01 RX ORDER — FLUTICASONE FUROATE, UMECLIDINIUM BROMIDE AND VILANTEROL TRIFENATATE 200; 62.5; 25 UG/1; UG/1; UG/1
1 POWDER RESPIRATORY (INHALATION) DAILY
Qty: 60 BLISTER | Refills: 0 | Status: SHIPPED | COMMUNITY
Start: 2023-05-01 | End: 2023-05-31

## 2023-05-01 RX ORDER — BENZONATATE 200 MG/1
200 CAPSULE ORAL 3 TIMES DAILY PRN
Qty: 90 CAPSULE | Refills: 1 | Status: SHIPPED | OUTPATIENT
Start: 2023-05-01

## 2023-05-01 NOTE — ASSESSMENT & PLAN NOTE
Henrietta Gama will continue with Tessalon  She will also continue with the ipratropium nasal spray  She feels comfortable with her level of symptoms and is not interested in further intervention aside from what is listed at this time

## 2023-05-01 NOTE — ASSESSMENT & PLAN NOTE
Nidia Tinoco will continue with her Tessalon and I have sent a prescription in for refill today  She uses her nebulizer, but not every day  She does feel benefit with the Trelegy  I gave her 2 samples of the Trelegy 200 to evaluate for benefit in the setting of increased steroid dose  If she finds benefit with this in regard to her symptoms, she will return call to the office and we will send a prescription to her pharmacy

## 2023-05-01 NOTE — PROGRESS NOTES
Pulmonary Follow-Up Note   Emily Huang 80 y o  female MRN: [id number]  5/1/2023      Assessment/Plan:    Problem List Items Addressed This Visit        Respiratory    COPD (chronic obstructive pulmonary disease) with chronic bronchitis ([place] [address] ) - Primary     Heather Cartagena will continue with her Tessalon and I have sent a prescription in for refill today  She uses her nebulizer, but not every day  She does feel benefit with the Trelegy  I gave her 2 samples of the Trelegy 200 to evaluate for benefit in the setting of increased steroid dose  If she finds benefit with this in regard to her symptoms, she will return call to the office and we will send a prescription to her pharmacy  Relevant Medications    benzonatate (TESSALON) 200 MG capsule    fluticasone-umeclidinium-vilanterol (Trelegy Ellipta) 200-62 5-25 mcg/actuation AEPB inhaler       Other    Chronic cough     Heather Cartagena will continue with Tessalon  She will also continue with the ipratropium nasal spray  She feels comfortable with her level of symptoms and is not interested in further intervention aside from what is listed at this time  Other Visit Diagnoses     Chronic obstructive pulmonary disease, unspecified COPD type ([place] [address] )        Relevant Medications    benzonatate (TESSALON) 200 MG capsule    fluticasone-umeclidinium-vilanterol (Trelegy Ellipta) 200-62 5-25 mcg/actuation AEPB inhaler          Education provided at this visit:   Need for Vaccination: Up-to-date on flu and pneumonia vaccines   Pulmonary Rehab: Not applicable   Smoking Cessation: Quit 1973  Inhaler Use: Reiterated proper use and technique    Return in about 6 months (around 11/1/2023), or if symptoms worsen or fail to improve  All of Leigh Ann's questions were answered prior to leaving the office today  She will follow-up with Dr Hayder Martinez in six months or sooner should the need arise  She is aware to call our office with any further questions or concerns      History of "Present Illness   Reason for Visit: Follow-up  Chief Complaint: \"I'm about the same  \"  HPI: Juancho Reyes is a 80 y o  female who presents to the office today for follow-up of mild COPD and chronic cough  Jacquelyn Mckeon is accompanied by her daughter Maynor Mckeon reports that she has been feeling about the same since she was seen for a sick visit in November in our office  She has a chronic daily cough productive of occasional clear mucus  She notes that her ipratropium nasal spray and her nebulizer with levalbuterol tend to help the symptoms  She does not always consistently use the nebulizer, but again when she does, it helps  She is also on Trelegy  She does not have fevers or chills  She does not have purulent sputum production  She denies any wheezing or shortness of breath  Aside from the above, no other complaints  At the last visit, a speech evaluation was ordered due to concern for swallowing issues  She and her daughter report that those symptoms resolved and are no longer an issue  Review of Systems   All other systems reviewed and are negative  A full 12-point review of systems was completed and is negative except for those outlined in the HPI      Historical Information   Past Medical History:   Diagnosis Date    Arthritis     Asthma     [facility] [address] [facility]     Diabetes mellitus [facility] [address] [facility]     Difficulty walking     Gout     Hyperlipidemia     Hypertension     Hypothyroidism     Neuropathy in diabetes [facility] [address] [facility]     Overactive bladder     [facility] vascular [facility])     Verruca      Past Surgical History:   Procedure Laterality Date    ARTERIAL BYPASS SURGERY      CAROTID ENDARTERECTOMY Right     CATARACT EXTRACTION      HYSTERECTOMY      NEPHRECTOMY      in her 25s     Family History   Problem Relation Age of Onset    Asthma Mother    Leslie Razor Asthma Father     Diabetes Father     Arthritis Father     Hypertension Father     Lung cancer Sister "  Early death Brother 39    COPD Daughter    [employer] Asthma Daughter     Rheum arthritis Daughter     Cataracts Daughter     Liver cancer Son     Alzheimer's disease Sister     Rectal cancer Sister     Thyroid cancer Sister     No Known Problems Sister     Asthma Son      Social History   Social History     Substance and Sexual Activity   Alcohol Use Not Currently    Alcohol/week: 1 0 standard drink    Types: 1 Glasses of wine per week     Social History     Substance and Sexual Activity   Drug Use Never     Social History     Tobacco Use   Smoking Status Former    Packs/day: 1 00    Years: 20 00    Pack years: 20 00    Types: Cigarettes    Start date: 56    Quit date: 80    Years since quittin 3   Smokeless Tobacco Former     E-Cigarette/Vaping    E-Cigarette Use Never User      E-Cigarette/Vaping Substances    Nicotine No     THC No     CBD No     Flavoring No     Other No     Unknown No        Meds/Allergies     Current Outpatient Medications:     albuterol (PROVENTIL HFA,VENTOLIN HFA) 90 mcg/act inhaler, Inhale 2 puffs every 4 (four) hours as needed, Disp: , Rfl: 0    aspirin (ECOTRIN LOW STRENGTH) 81 mg EC tablet, Take 81 mg by mouth daily, Disp: , Rfl:     atorvastatin (LIPITOR) 40 mg tablet, Take 40 mg by mouth daily, Disp: , Rfl:     BD PEN NEEDLE SKYLER U/F 32G X 4 MM MISC, 1 SYRINGE BY MISCELLANEOUS ROUTE DAILY, Disp: , Rfl: 0    benzonatate (TESSALON) 200 MG capsule, Take 1 capsule (200 mg total) by mouth 3 (three) times a day as needed for cough, Disp: 90 capsule, Rfl: 1    Calcium Carb-Cholecalciferol (CALTRATE 600+D3 SOFT PO), Take 2 tablets by mouth, Disp: , Rfl:     COLCRYS 0 6 MG tablet, Take 0 6 mg by mouth 2 (two) times a day, Disp: , Rfl: 0    docusate sodium (COLACE) 100 mg capsule, Take 100 mg by mouth 2 (two) times a day, Disp: , Rfl:     Dulaglutide 1 5 MG/0 5ML SOPN, Inject 1 5 mg under the skin once a week, Disp: , Rfl:     fluticasone (FLONASE) 50 mcg/act nasal spray, 1 spray into each nostril, Disp: , Rfl:     fluticasone-umeclidinium-vilanterol (Trelegy Ellipta) 200-62 5-25 mcg/actuation AEPB inhaler, Inhale 1 puff daily Rinse mouth after use , Disp: 60 blister, Rfl: 0    hydrochlorothiazide (HYDRODIURIL) 25 mg tablet, , Disp: , Rfl:     insulin glargine (LANTUS SOLOSTAR) 100 units/mL injection pen, Inject 20 Units under the skin daily, Disp: , Rfl:     ipratropium (ATROVENT) 0 06 % nasal spray, 2 sprays into each nostril 2 (two) times a day, Disp: 15 mL, Rfl: 3    JARDIANCE 10 MG TABS, Take 10 mg by mouth daily, Disp: , Rfl: 0    levalbuterol (XOPENEX) 0 63 mg/3 mL nebulizer solution, Take 3 mL (0 63 mg total) by nebulization 3 (three) times a day, Disp: 180 mL, Rfl: 3    levothyroxine 100 mcg tablet, Take 100 mcg by mouth daily, Disp: , Rfl:     loratadine (CLARITIN) 10 mg tablet, Take 10 mg by mouth daily, Disp: , Rfl:     ONE TOUCH ULTRA TEST test strip, USE FOR BLOOD GLUCOSE TESTING AS DIRECTED TWICE DAILY, Disp: , Rfl: 1    ONETOUCH DELICA LANCETS 30X MISC, 1 SYRINGE BY MISCELLANEOUS ROUTE 2 TIMES A DAY AS NEEDED DX E11 9 LAST O/V 10/20/18, Disp: , Rfl: 1    oxybutynin (DITROPAN-XL) 10 MG 24 hr tablet, Take 10 mg by mouth daily, Disp: , Rfl: 0    triamcinolone (KENALOG) 0 1 % ointment, Apply 1 application topically 2 (two) times a day To affected area, Disp: , Rfl: 1    Trulicity 3 57 QX/7 3DH SOPN, , Disp: , Rfl:     valsartan (DIOVAN) 320 MG tablet, , Disp: , Rfl:     clobetasol (TEMOVATE) 0 05 % ointment, APPLY TOPICALLY 2 (TWO) TIMES A DAY APPLY NIGHTLY FOR 1 WEEK, THEN EVERY OTHER NIGHT (Patient not taking: Reported on 7/11/2022), Disp: , Rfl:     fluconazole (DIFLUCAN) 150 mg tablet, TAKE 1 TABLET BY MOUTH AS ONE DOSE (Patient not taking: Reported on 7/11/2022), Disp: , Rfl: 0    omeprazole (PriLOSEC) 40 MG capsule, Take 40 mg by mouth daily before breakfast (Patient not taking: Reported on 7/11/2022), Disp: , Rfl:   Allergies "  Allergen Reactions    Ace Inhibitors      Other reaction(s): cough    Metformin      Other reaction(s): severe constipation    Penicillins Hives    Sulfa Antibiotics      Unknown reaction       Vitals: Blood pressure 110/70, pulse (!) 52, temperature 98 4 °F (36 9 °C), temperature source Tympanic, height 5' 1\" (1 549 m), weight 75 3 kg (166 lb), SpO2 99 %, not currently breastfeeding  Body mass index is 31 37 kg/m²  Oxygen Therapy  SpO2: 99 %  Oxygen Therapy: None (Room air)    Physical Exam:  Physical Exam  Vitals reviewed  Constitutional:       General: She is not in acute distress  Appearance: She is well-developed  She is not toxic-appearing or diaphoretic  HENT:      Head: Normocephalic and atraumatic  Eyes:      General: No scleral icterus  Neck:      Trachea: No tracheal deviation  Cardiovascular:      Rate and Rhythm: Normal rate and regular rhythm  Heart sounds: S1 normal and S2 normal  No murmur heard  No friction rub  No gallop  Pulmonary:      Effort: Pulmonary effort is normal  No tachypnea, accessory muscle usage or respiratory distress  Breath sounds: Normal breath sounds  No stridor  No decreased breath sounds, wheezing, rhonchi or rales  Chest:      Chest wall: No tenderness  Abdominal:      General: Bowel sounds are normal  There is no distension  Palpations: Abdomen is soft  Tenderness: There is no abdominal tenderness  Musculoskeletal:      Cervical back: Neck supple  Right lower leg: No edema  Left lower leg: No edema  Skin:     General: Skin is warm and dry  Findings: No rash  Neurological:      Mental Status: She is alert and oriented to person, place, and time  GCS: GCS eye subscore is 4  GCS verbal subscore is 5  GCS motor subscore is 6  Psychiatric:         Speech: Speech normal          Behavior: Behavior normal  Behavior is cooperative         No new labs or diagnostic testing      SELINA Rudolph  " "[facility]        Portions of the record may have been created with voice recognition software  Occasional wrong word or \"sound a like\" substitutions may have occurred due to the inherent limitations of voice recognition software  Read the chart carefully and recognize, using context, where substitutions have occurred or contact the dictating provider    "

## 2023-05-17 ENCOUNTER — TELEPHONE (OUTPATIENT)
Dept: PULMONOLOGY | Facility: CLINIC | Age: 88
End: 2023-05-17

## 2023-05-17 NOTE — TELEPHONE ENCOUNTER
Received a Denial on Leigh Ann's Benzonatate Capsule 200 mg  Denial Letter scanned to her office visit on 05/01/2023  Spoke with Iván and she stated she paid out of pocket for her medication  She was fine with doing so this time around and will see how this medication works for her  When she finishes Benzonatate, she will let us know if we need to send in an alternative

## 2023-05-25 DIAGNOSIS — J44.9 COPD (CHRONIC OBSTRUCTIVE PULMONARY DISEASE) WITH CHRONIC BRONCHITIS (HCC): ICD-10-CM

## 2023-05-25 NOTE — TELEPHONE ENCOUNTER
José Miguel Haro called on behalf of Kyung Thornton to request a prescription for Trelegy 200 mg (or 100 mg) be sent to [facility] on [address]  Routing message to Sangeetha Brooks for assistance

## 2023-05-26 RX ORDER — FLUTICASONE FUROATE, UMECLIDINIUM BROMIDE AND VILANTEROL TRIFENATATE 200; 62.5; 25 UG/1; UG/1; UG/1
1 POWDER RESPIRATORY (INHALATION) DAILY
Qty: 60 BLISTER | Refills: 3 | Status: SHIPPED | OUTPATIENT
Start: 2023-05-26 | End: 2023-09-23

## 2023-05-26 NOTE — TELEPHONE ENCOUNTER
fluticasone-umeclidinium-vilanterol (iMlolerachel Ellipgianna) 621-89 1-35 mcg/actuation AEPB inhaler  Patient requesting a refill on this medication

## 2023-07-01 ENCOUNTER — APPOINTMENT (OUTPATIENT)
Dept: LAB | Facility: CLINIC | Age: 88
End: 2023-07-01
Payer: MEDICARE

## 2023-07-01 DIAGNOSIS — R80.9 PROTEINURIA, UNSPECIFIED TYPE: ICD-10-CM

## 2023-07-01 DIAGNOSIS — N18.4 CHRONIC RENAL DISEASE, STAGE IV (HCC): ICD-10-CM

## 2023-07-01 DIAGNOSIS — E11.29 TYPE 2 DIABETES MELLITUS WITH OTHER KIDNEY COMPLICATION, UNSPECIFIED WHETHER LONG TERM INSULIN USE (HCC): ICD-10-CM

## 2023-07-01 DIAGNOSIS — I10 HYPERTENSION, UNSPECIFIED TYPE: ICD-10-CM

## 2023-07-01 DIAGNOSIS — N18.6 ANEMIA IN END-STAGE RENAL DISEASE (HCC): ICD-10-CM

## 2023-07-01 DIAGNOSIS — D63.1 ANEMIA IN END-STAGE RENAL DISEASE (HCC): ICD-10-CM

## 2023-07-01 LAB
25(OH)D3 SERPL-MCNC: 64.2 NG/ML (ref 30–100)
ALBUMIN SERPL BCP-MCNC: 3.8 G/DL (ref 3.5–5)
ALP SERPL-CCNC: 77 U/L (ref 34–104)
ALT SERPL W P-5'-P-CCNC: 12 U/L (ref 7–52)
ANION GAP SERPL CALCULATED.3IONS-SCNC: 6 MMOL/L
AST SERPL W P-5'-P-CCNC: 21 U/L (ref 13–39)
BASOPHILS # BLD AUTO: 0.05 THOUSANDS/ÂΜL (ref 0–0.1)
BASOPHILS NFR BLD AUTO: 1 % (ref 0–1)
BILIRUB SERPL-MCNC: 0.62 MG/DL (ref 0.2–1)
BUN SERPL-MCNC: 20 MG/DL (ref 5–25)
CALCIUM SERPL-MCNC: 9.2 MG/DL (ref 8.4–10.2)
CHLORIDE SERPL-SCNC: 106 MMOL/L (ref 96–108)
CO2 SERPL-SCNC: 27 MMOL/L (ref 21–32)
CREAT 24H UR-MRATE: 0.6 G/24HR (ref 0.6–1.8)
CREAT 24H UR-MRATE: 0.6 G/24HR (ref 0.6–1.8)
CREAT CL 24H UR+SERPL-VRATE: 25.56 ML/MIN (ref 75–115)
CREAT SERPL-MCNC: 1.56 MG/DL (ref 0.6–1.3)
CREAT UR-MCNC: 52.8 MG/DL
EOSINOPHIL # BLD AUTO: 0.39 THOUSAND/ÂΜL (ref 0–0.61)
EOSINOPHIL NFR BLD AUTO: 5 % (ref 0–6)
ERYTHROCYTE [DISTWIDTH] IN BLOOD BY AUTOMATED COUNT: 14.7 % (ref 11.6–15.1)
GFR SERPL CREATININE-BSD FRML MDRD: 28 ML/MIN/1.73SQ M
GLUCOSE P FAST SERPL-MCNC: 102 MG/DL (ref 65–99)
HCT VFR BLD AUTO: 44.7 % (ref 34.8–46.1)
HGB BLD-MCNC: 14.5 G/DL (ref 11.5–15.4)
IMM GRANULOCYTES # BLD AUTO: 0.03 THOUSAND/UL (ref 0–0.2)
IMM GRANULOCYTES NFR BLD AUTO: 0 % (ref 0–2)
LYMPHOCYTES # BLD AUTO: 1.43 THOUSANDS/ÂΜL (ref 0.6–4.47)
LYMPHOCYTES NFR BLD AUTO: 18 % (ref 14–44)
MAGNESIUM SERPL-MCNC: 2.2 MG/DL (ref 1.9–2.7)
MCH RBC QN AUTO: 28.7 PG (ref 26.8–34.3)
MCHC RBC AUTO-ENTMCNC: 32.4 G/DL (ref 31.4–37.4)
MCV RBC AUTO: 88 FL (ref 82–98)
MICROALBUMIN 24H UR-MRATE: 77 MG/24 HRS (ref 0–30)
MICROALBUMIN UR-MCNC: 70.3 MG/L (ref 0–20)
MONOCYTES # BLD AUTO: 0.51 THOUSAND/ÂΜL (ref 0.17–1.22)
MONOCYTES NFR BLD AUTO: 7 % (ref 4–12)
NEUTROPHILS # BLD AUTO: 5.47 THOUSANDS/ÂΜL (ref 1.85–7.62)
NEUTS SEG NFR BLD AUTO: 69 % (ref 43–75)
NRBC BLD AUTO-RTO: 0 /100 WBCS
PHOSPHATE SERPL-MCNC: 3.2 MG/DL (ref 2.3–4.1)
PLATELET # BLD AUTO: 191 THOUSANDS/UL (ref 149–390)
PMV BLD AUTO: 9.8 FL (ref 8.9–12.7)
POTASSIUM SERPL-SCNC: 4.2 MMOL/L (ref 3.5–5.3)
PROT 24H UR-MCNC: 253 MG/24 HRS
PROT SERPL-MCNC: 7.4 G/DL (ref 6.4–8.4)
PTH-INTACT SERPL-MCNC: 132.6 PG/ML (ref 12–88)
RBC # BLD AUTO: 5.06 MILLION/UL (ref 3.81–5.12)
SODIUM SERPL-SCNC: 139 MMOL/L (ref 135–147)
SPECIMEN VOL UR: 1100 ML
URATE SERPL-MCNC: 5.3 MG/DL (ref 2–7.5)
WBC # BLD AUTO: 7.88 THOUSAND/UL (ref 4.31–10.16)

## 2023-07-01 PROCEDURE — 84156 ASSAY OF PROTEIN URINE: CPT

## 2023-07-01 PROCEDURE — 36415 COLL VENOUS BLD VENIPUNCTURE: CPT

## 2023-07-01 PROCEDURE — 83735 ASSAY OF MAGNESIUM: CPT

## 2023-07-01 PROCEDURE — 83970 ASSAY OF PARATHORMONE: CPT

## 2023-07-01 PROCEDURE — 84165 PROTEIN E-PHORESIS SERUM: CPT

## 2023-07-01 PROCEDURE — 82570 ASSAY OF URINE CREATININE: CPT

## 2023-07-01 PROCEDURE — 85025 COMPLETE CBC W/AUTO DIFF WBC: CPT

## 2023-07-01 PROCEDURE — 80053 COMPREHEN METABOLIC PANEL: CPT

## 2023-07-01 PROCEDURE — 84550 ASSAY OF BLOOD/URIC ACID: CPT

## 2023-07-01 PROCEDURE — 82306 VITAMIN D 25 HYDROXY: CPT

## 2023-07-01 PROCEDURE — 84100 ASSAY OF PHOSPHORUS: CPT

## 2023-07-01 PROCEDURE — 82575 CREATININE CLEARANCE TEST: CPT

## 2023-07-01 PROCEDURE — 82043 UR ALBUMIN QUANTITATIVE: CPT

## 2023-07-05 LAB
ALBUMIN SERPL ELPH-MCNC: 3.86 G/DL (ref 3.2–5.1)
ALBUMIN SERPL ELPH-MCNC: 52.1 % (ref 48–70)
ALPHA1 GLOB SERPL ELPH-MCNC: 0.32 G/DL (ref 0.15–0.47)
ALPHA1 GLOB SERPL ELPH-MCNC: 4.3 % (ref 1.8–7)
ALPHA2 GLOB SERPL ELPH-MCNC: 0.82 G/DL (ref 0.42–1.04)
ALPHA2 GLOB SERPL ELPH-MCNC: 11.1 % (ref 5.9–14.9)
BETA GLOB ABNORMAL SERPL ELPH-MCNC: 0.41 G/DL (ref 0.31–0.57)
BETA1 GLOB SERPL ELPH-MCNC: 5.6 % (ref 4.7–7.7)
BETA2 GLOB SERPL ELPH-MCNC: 6.9 % (ref 3.1–7.9)
BETA2+GAMMA GLOB SERPL ELPH-MCNC: 0.51 G/DL (ref 0.2–0.58)
GAMMA GLOB ABNORMAL SERPL ELPH-MCNC: 1.48 G/DL (ref 0.4–1.66)
GAMMA GLOB SERPL ELPH-MCNC: 20 % (ref 6.9–22.3)
IGG/ALB SER: 1.09 {RATIO} (ref 1.1–1.8)
PROT PATTERN SERPL ELPH-IMP: ABNORMAL
PROT SERPL-MCNC: 7.4 G/DL (ref 6.4–8.2)

## 2023-07-05 PROCEDURE — 84165 PROTEIN E-PHORESIS SERUM: CPT | Performed by: PATHOLOGY

## 2023-08-08 ENCOUNTER — HOSPITAL ENCOUNTER (OUTPATIENT)
Dept: VASCULAR ULTRASOUND | Facility: HOSPITAL | Age: 88
Discharge: HOME | End: 2023-08-08
Payer: MEDICARE

## 2023-08-08 DIAGNOSIS — N18.4 CHRONIC RENAL DISEASE, STAGE IV (HCC): ICD-10-CM

## 2023-08-08 PROCEDURE — 93976 VASCULAR STUDY: CPT | Performed by: SURGERY

## 2023-08-08 PROCEDURE — 93975 VASCULAR STUDY: CPT

## 2023-08-14 ENCOUNTER — OFFICE VISIT (OUTPATIENT)
Dept: PODIATRY | Facility: CLINIC | Age: 88
End: 2023-08-14
Payer: MEDICARE

## 2023-08-14 VITALS — SYSTOLIC BLOOD PRESSURE: 174 MMHG | BODY MASS INDEX: 31.37 KG/M2 | DIASTOLIC BLOOD PRESSURE: 85 MMHG | WEIGHT: 166 LBS

## 2023-08-14 DIAGNOSIS — I73.9 PERIPHERAL VASCULAR DISEASE, UNSPECIFIED (HCC): ICD-10-CM

## 2023-08-14 DIAGNOSIS — E11.9 CONTROLLED TYPE 2 DIABETES MELLITUS WITHOUT COMPLICATION, WITHOUT LONG-TERM CURRENT USE OF INSULIN (HCC): Primary | ICD-10-CM

## 2023-08-14 DIAGNOSIS — L84 CORNS: ICD-10-CM

## 2023-08-14 PROCEDURE — G0127 TRIM NAIL(S): HCPCS | Performed by: PODIATRIST

## 2023-08-14 PROCEDURE — 11055 PARING/CUTG B9 HYPRKER LES 1: CPT | Performed by: PODIATRIST

## 2023-08-14 NOTE — PROGRESS NOTES
Established patient with class findings presents for nail and lesion care. Vascular exam:  DP  0/4 bilateral; PT  0 4 bilateral   Dermatological exam:  Each toenail is thick and  dystrophic. Hyperkeratotic lesion PIPJ right fifth toe  Diagnosis:  Diabetes mellitus; hyperkeratotic lesion right fifth toe. Treatment: Trimmed multiple dystrophic toenails. Trimmed hyperkeratotic lesion right fifth toe.     Nail removal    Date/Time: 8/14/2023 1:00 PM    Performed by: Jose E Evans DPM  Authorized by: Jose E Evans DPM    Nails trimmed:     Number of nails trimmed:  10  Lesion Destruction    Date/Time: 8/14/2023 1:00 PM    Performed by: Jose E Evans DPM  Authorized by: Jose E Evans DPM    Procedure Details - Lesion Destruction:     Number of Lesions:  1  Lesion 1:     Body area:  Lower extremity    Lower extremity location:  R little toe    Malignancy: benign hyperkeratotic lesion      Destruction method: scissors used for extraction

## 2023-10-19 ENCOUNTER — OFFICE VISIT (OUTPATIENT)
Dept: OBGYN CLINIC | Facility: CLINIC | Age: 88
End: 2023-10-19
Payer: MEDICARE

## 2023-10-19 VITALS
SYSTOLIC BLOOD PRESSURE: 160 MMHG | HEIGHT: 61 IN | BODY MASS INDEX: 31.68 KG/M2 | WEIGHT: 167.8 LBS | DIASTOLIC BLOOD PRESSURE: 80 MMHG

## 2023-10-19 DIAGNOSIS — L90.0 LICHEN SCLEROSUS: Primary | ICD-10-CM
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
## 2023-10-19 PROBLEM — N28.9 MILD RENAL INSUFFICIENCY: Status: ACTIVE | Noted: 2017-06-28

## 2023-10-19 PROBLEM — J44.0 COPD (CHRONIC OBSTRUCTIVE PULMONARY DISEASE) WITH ACUTE BRONCHITIS: Status: ACTIVE | Noted: 2022-08-25

## 2023-10-19 PROBLEM — J44.0 COPD (CHRONIC OBSTRUCTIVE PULMONARY DISEASE) WITH ACUTE BRONCHITIS: Status: RESOLVED | Noted: 2022-08-25 | Resolved: 2023-10-19

## 2023-10-19 PROBLEM — J20.9 COPD (CHRONIC OBSTRUCTIVE PULMONARY DISEASE) WITH ACUTE BRONCHITIS: Status: ACTIVE | Noted: 2022-08-25

## 2023-10-19 PROBLEM — J20.9 COPD (CHRONIC OBSTRUCTIVE PULMONARY DISEASE) WITH ACUTE BRONCHITIS: Status: RESOLVED | Noted: 2022-08-25 | Resolved: 2023-10-19

## 2023-10-19 PROCEDURE — 99203 OFFICE O/P NEW LOW 30 MIN: CPT | Performed by: OBSTETRICS & GYNECOLOGY

## 2023-10-19 RX ORDER — CLOBETASOL PROPIONATE 0.5 MG/G
OINTMENT TOPICAL
Qty: 45 G | Refills: 3 | Status: SHIPPED | OUTPATIENT
Start: 2023-10-19

## 2023-10-19 NOTE — PROGRESS NOTES
Assessment/Plan:      Lichen sclerosus  -     clobetasol (TEMOVATE) 0.05 % ointment; Apply topically daily at bedtime          - use coconut oil as a moisturizer    Subjective:      Patient ID: Whitney Rodriguez is a 80 y.o. female. HPI  80y P3 presents with the complaint of worsening lichen. She is not using any steroids on the vulva. She has been using Monistat intermittently. She reports itching and irritation. She notices the skin is very pale. No vaginal bleeding or discharge    The following portions of the patient's history were reviewed and updated as appropriate: allergies, current medications, past family history, past medical history, past social history, past surgical history, and problem list.    Review of Systems   Constitutional: Negative. HENT: Negative. Respiratory: Negative. Genitourinary:  Positive for genital sores and vaginal pain. Negative for pelvic pain, vaginal bleeding and vaginal discharge. Objective:      /80 (BP Location: Left arm, Patient Position: Sitting, Cuff Size: Standard)   Ht 5' 1" (1.549 m)   Wt 76.1 kg (167 lb 12.8 oz)   BMI 31.71 kg/m²          Physical Exam  Vitals and nursing note reviewed. Constitutional:       Appearance: Normal appearance. HENT:      Head: Normocephalic and atraumatic. Eyes:      Extraocular Movements: Extraocular movements intact. Conjunctiva/sclera: Conjunctivae normal.      Pupils: Pupils are equal, round, and reactive to light. Pulmonary:      Effort: Pulmonary effort is normal.   Genitourinary:     Labia:         Right: Rash present. No tenderness. Left: Rash present. No tenderness. Comments: Significant lichen sclerosis - skin has lost all pigmenation  Skin:     General: Skin is warm and dry. Neurological:      General: No focal deficit present. Mental Status: She is alert and oriented to person, place, and time.    Psychiatric:         Mood and Affect: Mood normal. Behavior: Behavior normal.

## 2023-11-14 ENCOUNTER — HOSPITAL ENCOUNTER (INPATIENT)
Facility: HOSPITAL | Age: 88
LOS: 2 days | Discharge: HOME | DRG: 192 | End: 2023-11-16
Attending: EMERGENCY MEDICINE | Admitting: INTERNAL MEDICINE
Payer: MEDICARE

## 2023-11-14 ENCOUNTER — APPOINTMENT (EMERGENCY)
Dept: RADIOLOGY | Facility: HOSPITAL | Age: 88
DRG: 192 | End: 2023-11-14
Payer: MEDICARE

## 2023-11-14 DIAGNOSIS — J44.89 COPD (CHRONIC OBSTRUCTIVE PULMONARY DISEASE) WITH CHRONIC BRONCHITIS: ICD-10-CM

## 2023-11-14 DIAGNOSIS — R13.10 DYSPHAGIA: ICD-10-CM

## 2023-11-14 DIAGNOSIS — J44.1 COPD EXACERBATION (HCC): Primary | ICD-10-CM

## 2023-11-14 DIAGNOSIS — E11.9 DM2 (DIABETES MELLITUS, TYPE 2) (HCC): Chronic | ICD-10-CM

## 2023-11-14 PROBLEM — N18.9 CKD (CHRONIC KIDNEY DISEASE): Status: ACTIVE | Noted: 2023-11-14

## 2023-11-14 PROBLEM — R07.9 CHEST PAIN: Status: ACTIVE | Noted: 2023-11-14

## 2023-11-14 LAB
2HR DELTA HS TROPONIN: 2 NG/L
4HR DELTA HS TROPONIN: 9 NG/L
ALBUMIN SERPL BCP-MCNC: 3.8 G/DL (ref 3.5–5)
ALP SERPL-CCNC: 77 U/L (ref 34–104)
ALT SERPL W P-5'-P-CCNC: 17 U/L (ref 7–52)
ANION GAP SERPL CALCULATED.3IONS-SCNC: 4 MMOL/L
AST SERPL W P-5'-P-CCNC: 22 U/L (ref 13–39)
BASOPHILS # BLD AUTO: 0.07 THOUSANDS/ÂΜL (ref 0–0.1)
BASOPHILS NFR BLD AUTO: 1 % (ref 0–1)
BILIRUB SERPL-MCNC: 0.49 MG/DL (ref 0.2–1)
BNP SERPL-MCNC: 11 PG/ML (ref 0–100)
BUN SERPL-MCNC: 25 MG/DL (ref 5–25)
CALCIUM SERPL-MCNC: 9.5 MG/DL (ref 8.4–10.2)
CARDIAC TROPONIN I PNL SERPL HS: 16 NG/L
CARDIAC TROPONIN I PNL SERPL HS: 7 NG/L
CARDIAC TROPONIN I PNL SERPL HS: 9 NG/L
CHLORIDE SERPL-SCNC: 107 MMOL/L (ref 96–108)
CO2 SERPL-SCNC: 27 MMOL/L (ref 21–32)
CREAT SERPL-MCNC: 1.71 MG/DL (ref 0.6–1.3)
EOSINOPHIL # BLD AUTO: 0.43 THOUSAND/ÂΜL (ref 0–0.61)
EOSINOPHIL NFR BLD AUTO: 6 % (ref 0–6)
ERYTHROCYTE [DISTWIDTH] IN BLOOD BY AUTOMATED COUNT: 15.2 % (ref 11.6–15.1)
FLUAV RNA RESP QL NAA+PROBE: NEGATIVE
FLUBV RNA RESP QL NAA+PROBE: NEGATIVE
GFR SERPL CREATININE-BSD FRML MDRD: 25 ML/MIN/1.73SQ M
GLUCOSE SERPL-MCNC: 127 MG/DL (ref 65–140)
GLUCOSE SERPL-MCNC: 185 MG/DL (ref 65–140)
GLUCOSE SERPL-MCNC: 223 MG/DL (ref 65–140)
GLUCOSE SERPL-MCNC: 475 MG/DL (ref 65–140)
GLUCOSE SERPL-MCNC: 479 MG/DL (ref 65–140)
GLUCOSE SERPL-MCNC: >500 MG/DL (ref 65–140)
HCT VFR BLD AUTO: 45.7 % (ref 34.8–46.1)
HGB BLD-MCNC: 14.7 G/DL (ref 11.5–15.4)
IMM GRANULOCYTES # BLD AUTO: 0.03 THOUSAND/UL (ref 0–0.2)
IMM GRANULOCYTES NFR BLD AUTO: 0 % (ref 0–2)
LYMPHOCYTES # BLD AUTO: 1.9 THOUSANDS/ÂΜL (ref 0.6–4.47)
LYMPHOCYTES NFR BLD AUTO: 28 % (ref 14–44)
MCH RBC QN AUTO: 27.9 PG (ref 26.8–34.3)
MCHC RBC AUTO-ENTMCNC: 32.2 G/DL (ref 31.4–37.4)
MCV RBC AUTO: 87 FL (ref 82–98)
MONOCYTES # BLD AUTO: 0.53 THOUSAND/ÂΜL (ref 0.17–1.22)
MONOCYTES NFR BLD AUTO: 8 % (ref 4–12)
NEUTROPHILS # BLD AUTO: 3.91 THOUSANDS/ÂΜL (ref 1.85–7.62)
NEUTS SEG NFR BLD AUTO: 57 % (ref 43–75)
NRBC BLD AUTO-RTO: 0 /100 WBCS
PLATELET # BLD AUTO: 186 THOUSANDS/UL (ref 149–390)
PLATELET # BLD AUTO: 191 THOUSANDS/UL (ref 149–390)
PMV BLD AUTO: 9.5 FL (ref 8.9–12.7)
PMV BLD AUTO: 9.8 FL (ref 8.9–12.7)
POTASSIUM SERPL-SCNC: 4.7 MMOL/L (ref 3.5–5.3)
PROCALCITONIN SERPL-MCNC: 0.13 NG/ML
PROT SERPL-MCNC: 7.5 G/DL (ref 6.4–8.4)
RBC # BLD AUTO: 5.26 MILLION/UL (ref 3.81–5.12)
RSV RNA RESP QL NAA+PROBE: NEGATIVE
SARS-COV-2 RNA RESP QL NAA+PROBE: NEGATIVE
SODIUM SERPL-SCNC: 138 MMOL/L (ref 135–147)
WBC # BLD AUTO: 6.87 THOUSAND/UL (ref 4.31–10.16)

## 2023-11-14 PROCEDURE — 96374 THER/PROPH/DIAG INJ IV PUSH: CPT

## 2023-11-14 PROCEDURE — 93005 ELECTROCARDIOGRAM TRACING: CPT

## 2023-11-14 PROCEDURE — 94664 DEMO&/EVAL PT USE INHALER: CPT

## 2023-11-14 PROCEDURE — 84145 PROCALCITONIN (PCT): CPT | Performed by: INTERNAL MEDICINE

## 2023-11-14 PROCEDURE — 99285 EMERGENCY DEPT VISIT HI MDM: CPT | Performed by: EMERGENCY MEDICINE

## 2023-11-14 PROCEDURE — 0241U HB NFCT DS VIR RESP RNA 4 TRGT: CPT

## 2023-11-14 PROCEDURE — 84484 ASSAY OF TROPONIN QUANT: CPT | Performed by: INTERNAL MEDICINE

## 2023-11-14 PROCEDURE — 94640 AIRWAY INHALATION TREATMENT: CPT

## 2023-11-14 PROCEDURE — 83880 ASSAY OF NATRIURETIC PEPTIDE: CPT | Performed by: INTERNAL MEDICINE

## 2023-11-14 PROCEDURE — 94668 MNPJ CHEST WALL SBSQ: CPT

## 2023-11-14 PROCEDURE — 94669 MECHANICAL CHEST WALL OSCILL: CPT

## 2023-11-14 PROCEDURE — 82948 REAGENT STRIP/BLOOD GLUCOSE: CPT

## 2023-11-14 PROCEDURE — 99285 EMERGENCY DEPT VISIT HI MDM: CPT

## 2023-11-14 PROCEDURE — 99223 1ST HOSP IP/OBS HIGH 75: CPT | Performed by: INTERNAL MEDICINE

## 2023-11-14 PROCEDURE — 94760 N-INVAS EAR/PLS OXIMETRY 1: CPT

## 2023-11-14 PROCEDURE — 85025 COMPLETE CBC W/AUTO DIFF WBC: CPT

## 2023-11-14 PROCEDURE — 36415 COLL VENOUS BLD VENIPUNCTURE: CPT

## 2023-11-14 PROCEDURE — 71045 X-RAY EXAM CHEST 1 VIEW: CPT

## 2023-11-14 PROCEDURE — 80053 COMPREHEN METABOLIC PANEL: CPT

## 2023-11-14 PROCEDURE — 85049 AUTOMATED PLATELET COUNT: CPT | Performed by: INTERNAL MEDICINE

## 2023-11-14 PROCEDURE — 84484 ASSAY OF TROPONIN QUANT: CPT

## 2023-11-14 RX ORDER — INSULIN LISPRO 100 [IU]/ML
1-5 INJECTION, SOLUTION INTRAVENOUS; SUBCUTANEOUS
Status: DISCONTINUED | OUTPATIENT
Start: 2023-11-14 | End: 2023-11-16 | Stop reason: HOSPADM

## 2023-11-14 RX ORDER — IPRATROPIUM BROMIDE 42 UG/1
2 SPRAY, METERED NASAL 2 TIMES DAILY
Status: DISCONTINUED | OUTPATIENT
Start: 2023-11-14 | End: 2023-11-15 | Stop reason: RX

## 2023-11-14 RX ORDER — INSULIN GLARGINE 100 [IU]/ML
15 INJECTION, SOLUTION SUBCUTANEOUS
Status: DISCONTINUED | OUTPATIENT
Start: 2023-11-14 | End: 2023-11-16 | Stop reason: HOSPADM

## 2023-11-14 RX ORDER — GUAIFENESIN 600 MG/1
600 TABLET, EXTENDED RELEASE ORAL 2 TIMES DAILY
Status: DISCONTINUED | OUTPATIENT
Start: 2023-11-14 | End: 2023-11-16 | Stop reason: HOSPADM

## 2023-11-14 RX ORDER — IPRATROPIUM BROMIDE AND ALBUTEROL SULFATE 2.5; .5 MG/3ML; MG/3ML
3 SOLUTION RESPIRATORY (INHALATION) ONCE
Status: COMPLETED | OUTPATIENT
Start: 2023-11-14 | End: 2023-11-14

## 2023-11-14 RX ORDER — CLOBETASOL PROPIONATE 0.5 MG/G
CREAM TOPICAL
Status: DISCONTINUED | OUTPATIENT
Start: 2023-11-14 | End: 2023-11-16 | Stop reason: HOSPADM

## 2023-11-14 RX ORDER — ACETAMINOPHEN 325 MG/1
650 TABLET ORAL EVERY 6 HOURS PRN
Status: DISCONTINUED | OUTPATIENT
Start: 2023-11-14 | End: 2023-11-16 | Stop reason: HOSPADM

## 2023-11-14 RX ORDER — FLUTICASONE FUROATE AND VILANTEROL 200; 25 UG/1; UG/1
1 POWDER RESPIRATORY (INHALATION) DAILY
Status: DISCONTINUED | OUTPATIENT
Start: 2023-11-15 | End: 2023-11-16 | Stop reason: HOSPADM

## 2023-11-14 RX ORDER — METHYLPREDNISOLONE SODIUM SUCCINATE 40 MG/ML
40 INJECTION, POWDER, LYOPHILIZED, FOR SOLUTION INTRAMUSCULAR; INTRAVENOUS EVERY 12 HOURS SCHEDULED
Status: DISCONTINUED | OUTPATIENT
Start: 2023-11-14 | End: 2023-11-15

## 2023-11-14 RX ORDER — METHYLPREDNISOLONE SODIUM SUCCINATE 125 MG/2ML
125 INJECTION, POWDER, LYOPHILIZED, FOR SOLUTION INTRAMUSCULAR; INTRAVENOUS ONCE
Status: COMPLETED | OUTPATIENT
Start: 2023-11-14 | End: 2023-11-14

## 2023-11-14 RX ORDER — AZITHROMYCIN 250 MG/1
500 TABLET, FILM COATED ORAL EVERY 24 HOURS
Status: DISCONTINUED | OUTPATIENT
Start: 2023-11-14 | End: 2023-11-16 | Stop reason: HOSPADM

## 2023-11-14 RX ORDER — FLUTICASONE PROPIONATE 50 MCG
1 SPRAY, SUSPENSION (ML) NASAL DAILY
Status: DISCONTINUED | OUTPATIENT
Start: 2023-11-15 | End: 2023-11-16 | Stop reason: HOSPADM

## 2023-11-14 RX ORDER — LEVOTHYROXINE SODIUM 0.1 MG/1
100 TABLET ORAL
Status: DISCONTINUED | OUTPATIENT
Start: 2023-11-15 | End: 2023-11-16 | Stop reason: HOSPADM

## 2023-11-14 RX ORDER — IPRATROPIUM BROMIDE AND ALBUTEROL SULFATE 2.5; .5 MG/3ML; MG/3ML
3 SOLUTION RESPIRATORY (INHALATION)
Status: DISCONTINUED | OUTPATIENT
Start: 2023-11-14 | End: 2023-11-14

## 2023-11-14 RX ORDER — OXYBUTYNIN CHLORIDE 5 MG/1
10 TABLET, EXTENDED RELEASE ORAL DAILY
Status: DISCONTINUED | OUTPATIENT
Start: 2023-11-15 | End: 2023-11-16 | Stop reason: HOSPADM

## 2023-11-14 RX ORDER — LORATADINE 10 MG/1
10 TABLET ORAL DAILY
Status: DISCONTINUED | OUTPATIENT
Start: 2023-11-15 | End: 2023-11-16 | Stop reason: HOSPADM

## 2023-11-14 RX ORDER — LEVALBUTEROL INHALATION SOLUTION 1.25 MG/3ML
1.25 SOLUTION RESPIRATORY (INHALATION)
Status: DISCONTINUED | OUTPATIENT
Start: 2023-11-14 | End: 2023-11-16 | Stop reason: HOSPADM

## 2023-11-14 RX ORDER — ALBUTEROL SULFATE 90 UG/1
2 AEROSOL, METERED RESPIRATORY (INHALATION) EVERY 4 HOURS PRN
Status: DISCONTINUED | OUTPATIENT
Start: 2023-11-14 | End: 2023-11-16 | Stop reason: HOSPADM

## 2023-11-14 RX ORDER — ATORVASTATIN CALCIUM 40 MG/1
40 TABLET, FILM COATED ORAL DAILY
Status: DISCONTINUED | OUTPATIENT
Start: 2023-11-15 | End: 2023-11-16 | Stop reason: HOSPADM

## 2023-11-14 RX ORDER — FUROSEMIDE 10 MG/ML
20 INJECTION INTRAMUSCULAR; INTRAVENOUS ONCE
Status: COMPLETED | OUTPATIENT
Start: 2023-11-14 | End: 2023-11-14

## 2023-11-14 RX ORDER — PANTOPRAZOLE SODIUM 40 MG/1
40 TABLET, DELAYED RELEASE ORAL
Status: DISCONTINUED | OUTPATIENT
Start: 2023-11-15 | End: 2023-11-16 | Stop reason: HOSPADM

## 2023-11-14 RX ORDER — ALBUTEROL SULFATE 1.25 MG/3ML
1.25 SOLUTION RESPIRATORY (INHALATION) 2 TIMES DAILY
COMMUNITY

## 2023-11-14 RX ORDER — LOSARTAN POTASSIUM 50 MG/1
100 TABLET ORAL DAILY
Status: DISCONTINUED | OUTPATIENT
Start: 2023-11-15 | End: 2023-11-16 | Stop reason: HOSPADM

## 2023-11-14 RX ORDER — HEPARIN SODIUM 5000 [USP'U]/ML
5000 INJECTION, SOLUTION INTRAVENOUS; SUBCUTANEOUS EVERY 8 HOURS SCHEDULED
Status: DISCONTINUED | OUTPATIENT
Start: 2023-11-14 | End: 2023-11-16 | Stop reason: HOSPADM

## 2023-11-14 RX ORDER — MULTIVITAMIN
1 TABLET ORAL DAILY
COMMUNITY

## 2023-11-14 RX ORDER — BENZONATATE 100 MG/1
200 CAPSULE ORAL 3 TIMES DAILY
Status: DISCONTINUED | OUTPATIENT
Start: 2023-11-14 | End: 2023-11-16 | Stop reason: HOSPADM

## 2023-11-14 RX ADMIN — METHYLPREDNISOLONE SODIUM SUCCINATE 125 MG: 125 INJECTION, POWDER, FOR SOLUTION INTRAMUSCULAR; INTRAVENOUS at 12:30

## 2023-11-14 RX ADMIN — GUAIFENESIN 600 MG: 600 TABLET ORAL at 17:59

## 2023-11-14 RX ADMIN — BENZONATATE 200 MG: 100 CAPSULE ORAL at 17:58

## 2023-11-14 RX ADMIN — LEVALBUTEROL HYDROCHLORIDE 1.25 MG: 1.25 SOLUTION RESPIRATORY (INHALATION) at 20:32

## 2023-11-14 RX ADMIN — CLOBETASOL PROPIONATE: 0.5 CREAM TOPICAL at 22:10

## 2023-11-14 RX ADMIN — BENZONATATE 200 MG: 100 CAPSULE ORAL at 21:09

## 2023-11-14 RX ADMIN — FUROSEMIDE 20 MG: 10 INJECTION, SOLUTION INTRAMUSCULAR; INTRAVENOUS at 17:59

## 2023-11-14 RX ADMIN — INSULIN GLARGINE 15 UNITS: 100 INJECTION, SOLUTION SUBCUTANEOUS at 21:10

## 2023-11-14 RX ADMIN — IPRATROPIUM BROMIDE AND ALBUTEROL SULFATE 3 ML: 2.5; .5 SOLUTION RESPIRATORY (INHALATION) at 13:13

## 2023-11-14 RX ADMIN — AZITHROMYCIN DIHYDRATE 500 MG: 250 TABLET ORAL at 17:58

## 2023-11-14 RX ADMIN — IPRATROPIUM BROMIDE 0.5 MG: 0.5 SOLUTION RESPIRATORY (INHALATION) at 20:32

## 2023-11-14 RX ADMIN — INSULIN HUMAN 7 UNITS: 100 INJECTION, SOLUTION PARENTERAL at 21:23

## 2023-11-14 RX ADMIN — METHYLPREDNISOLONE SODIUM SUCCINATE 40 MG: 40 INJECTION, POWDER, FOR SOLUTION INTRAMUSCULAR; INTRAVENOUS at 21:09

## 2023-11-14 RX ADMIN — IPRATROPIUM BROMIDE AND ALBUTEROL SULFATE 3 ML: 2.5; .5 SOLUTION RESPIRATORY (INHALATION) at 12:05

## 2023-11-14 RX ADMIN — HEPARIN SODIUM 5000 UNITS: 5000 INJECTION INTRAVENOUS; SUBCUTANEOUS at 21:10

## 2023-11-14 RX ADMIN — INSULIN LISPRO 1 UNITS: 100 INJECTION, SOLUTION INTRAVENOUS; SUBCUTANEOUS at 15:57

## 2023-11-14 NOTE — H&P
[id number] [facility]  H&P  Name: Judi Arreola 80 y.o. female I MRN: [id number]  Unit/Bed#: ED-35 I Date of Admission: 11/14/2023   Date of Service: 11/14/2023 I Hospital Day: 0      Assessment/Plan   * COPD (chronic obstructive pulmonary disease) with chronic bronchitis  Assessment & Plan  Presented with worsening productive cough x few weeks. Does have chronic cough as documented below. Suspect secondary to COPD exacerbation. Took azithromycin approx 2 weeks ago without improvement. Has been using home Trelegy and albuterol nebs without improvement. Endorses SOB with exertion. CXR negative for focal infiltrate, COVID/Flu/RSV negative, No fevers or leukocytosis  Will check procal/BNP  Start IV steroids, wean to PO prednisone as able  Trial dose of IV lasix x1 given mild congestion on CXR  Continue Duonebs  Start azithromycin  Continue Trelegy  Supportive care with tessalon TID  Consult pulmonology, patient known to them     Chest pain  Assessment & Plan  Noted over past several months, attributed to chronic cough/dysphagia/"indigestion". Patient denies any history of CAD/CHF. Initial troponin negative, EKG nonischemic  Will trend troponins  Continue home asa/statin  Discussed with patient and daughter that we could consider echocardiogram, however given advanced age will defer at this time per their preference    Chronic cough  Assessment & Plan  Chronic in nature, documented in pulmonology notes since at least May 2022. Has had speech eval per notes. Patient and daughter report worsening cough x3 weeks, productive of yellow sputum  Continue IV steroids, nebs.  Add tessalon/mucinex  Check speech eval given associated dysphagia--start PPI  Azithro as above  Check sputum cx if able   Continue flonase/Claritin     Dysphagia  Assessment & Plan  Patient and daughter report that occasionally food "gets stuck"  Appears patient has a hiatal hernia  Start PPI  Speech eval ordered   May contribute to chronic cough     CKD (chronic kidney disease)  Assessment & Plan  Lab Results   Component Value Date    EGFR 25 11/14/2023    EGFR 28 07/01/2023    EGFR 16 04/12/2023    CREATININE 1.71 (H) 11/14/2023    CREATININE 1.56 (H) 07/01/2023    CREATININE 2.39 (H) 04/12/2023   Baseline widely variable but per review appears to be in 1.5-2.0 range as of past year or so  Currently at baseline, ok to continue ARB for now  Trial dose of IV lasix x1  Avoid hypotension/nephrotoxins  Monitor UOP     Hypothyroidism  Assessment & Plan  Continue synthroid    Essential hypertension  Assessment & Plan  BP elevated upon admission as she did not take AM medications   Home meds include Valsartan 320 mg daily, subbed losartan here      DM2 (diabetes mellitus, type 2) ([facility])  Assessment & Plan  Lab Results   Component Value Date    HGBA1C 6.6 (H) 04/12/2023       No results for input(s): "POCGLU" in the last 72 hours. Blood Sugar Average: Last 72 hrs:  Most recent A1C acceptable  Home regimen includes Trulicity once weekly, Jardiance 10 mg daily, Lantus 10 units at bedtime  Here will place on Lantus 15 units HS given steroids + SSI  Watch with steroids, expect hyperglycemia  Diabetic diet             VTE Pharmacologic Prophylaxis: VTE Score: 5 Moderate Risk (Score 3-4) - Pharmacological DVT Prophylaxis Ordered: heparin. Code Status: FULL CODE  Discussion with family: Updated  (daughter) at bedside. Anticipated Length of Stay: Patient will be admitted on an inpatient basis with an anticipated length of stay of greater than 2 midnights secondary to IV steroids, pulm eval.    Total Time Spent on Date of Encounter in care of patient: 45 mins.  This time was spent on one or more of the following: performing physical exam; counseling and coordination of care; obtaining or reviewing history; documenting in the medical record; reviewing/ordering tests, medications or procedures; communicating with other healthcare professionals and discussing with patient's family/caregivers. Chief Complaint: Cough     History of Present Illness:  Aure Gusman is a 80 y.o. female with a PMH of COPD (remote smoking hx, details unclear), HTN, carotid artery disease, HLD, hiatal hernia, hypothyroidism, DM2 who presents with worsening cough x3 weeks. Patient's daughter helps to provide history. Patient and daughter state that approximately 3 weeks ago she started with worsening cough. She does have a chronic cough at baseline but it is typically nonproductive. This cough has been productive of thick yellow sputum. States she has been short of breath with exertion and admits to some chest pain as well, primarily associated with eating or coughing. Denies any fevers or chills. Daughter has been sick with something similar. Reports she tried azithromycin several weeks ago without any improvement. Has been using her nebulizer at home along with her Trelegy inhaler. Has not been on steroids to her knowledge. Recently got back from a cruise approximately 2 days ago where she was very active per daughter, though still complained of a cough. Denies any leg swelling, increased abdominal distension, dizziness, lightheadedness, dysuria, stool changes. Review of Systems:  Review of Systems   Constitutional:  Negative for chills, fatigue, fever and unexpected weight change. HENT:  Negative for rhinorrhea, sinus pain and sore throat. Respiratory:  Positive for cough, shortness of breath and wheezing. Cardiovascular:  Positive for chest pain. Negative for leg swelling. Gastrointestinal: Negative. Genitourinary: Negative. Musculoskeletal: Negative. Neurological: Negative. All other systems reviewed and are negative.       Past Medical and Surgical History:   Past Medical History:   Diagnosis Date    Arthritis     Asthma     COPD (chronic obstructive pulmonary disease) ([facility])     Diabetes mellitus ([facility])     Difficulty walking Gout     Hyperlipidemia     Hypertension     Hypothyroidism     Neuropathy in diabetes ([address] [facility])     Overactive bladder     PVD (peripheral vascular disease) ([facility])     Verruca        Past Surgical History:   Procedure Laterality Date    ARTERIAL BYPASS SURGERY      CAROTID ENDARTERECTOMY Right     CATARACT EXTRACTION      HYSTERECTOMY      NEPHRECTOMY      in her 25s       Meds/Allergies:  Prior to Admission medications    Medication Sig Start Date End Date Taking?  Authorizing Provider   albuterol (ACCUNEB) 1.25 MG/3ML nebulizer solution Take 1.25 mg by nebulization 2 (two) times a day   Yes Historical Provider, MD   Multiple Vitamin (multivitamin) tablet Take 1 tablet by mouth daily   Yes Historical Provider, MD   albuterol (PROVENTIL HFA,VENTOLIN HFA) 90 mcg/act inhaler Inhale 2 puffs every 4 (four) hours as needed 4/16/19   Historical Provider, MD   aspirin (ECOTRIN LOW STRENGTH) 81 mg EC tablet Take 81 mg by mouth daily    Historical Provider, MD   atorvastatin (LIPITOR) 40 mg tablet Take 40 mg by mouth daily    Historical Provider, MD   BD PEN NEEDLE SKYLER U/F 32G X 4 MM MISC 1 SYRINGE BY MISCELLANEOUS ROUTE DAILY 3/19/19   Historical Provider, MD   benzonatate (TESSALON) 200 MG capsule Take 1 capsule (200 mg total) by mouth 3 (three) times a day as needed for cough 5/1/23   SELINA Lloyd   Calcium Carb-Cholecalciferol (CALTRATE 600+D3 SOFT PO) Take 2 tablets by mouth    Historical Provider, MD   clobetasol (TEMOVATE) 0.05 % ointment Apply topically daily at bedtime 10/19/23   Rohan Wilkins MD   docusate sodium (COLACE) 100 mg capsule Take 100 mg by mouth 1 (one) time    Historical Provider, MD   Dulaglutide 1.5 MG/0.5ML SOPN Inject 1.5 mg under the skin once a week    Historical Provider, MD   fluticasone (FLONASE) 50 mcg/act nasal spray 1 spray into each nostril    Historical Provider, MD   fluticasone-umeclidinium-vilanterol (Trelegy Ellipta) 200-62.5-25 mcg/actuation AEPB inhaler Inhale 1 puff daily Rinse mouth after use. 5/26/23 10/19/23  Leigha Lomax DO   insulin glargine (LANTUS SOLOSTAR) 100 units/mL injection pen Inject 10 Units under the skin daily at bedtime 5/9/19   Historical Provider, MD   ipratropium (ATROVENT) 0.06 % nasal spray 2 sprays into each nostril 2 (two) times a day 4/20/22   Leigha Lomax DO   JARDIANCE 10 MG TABS Take 10 mg by mouth daily 4/16/19   Historical Provider, MD   levalbuterol (XOPENEX) 0.63 mg/3 mL nebulizer solution Take 3 mL (0.63 mg total) by nebulization 3 (three) times a day  Patient taking differently: Take 0.63 mg by nebulization 2 (two) times a day 10/18/22   SELINA Frazier   levothyroxine 100 mcg tablet Take 100 mcg by mouth daily    Historical Provider, MD   loratadine (CLARITIN) 10 mg tablet Take 10 mg by mouth daily    Historical Provider, MD   ONE TOUCH ULTRA TEST test strip USE FOR BLOOD GLUCOSE TESTING AS DIRECTED TWICE DAILY 4/14/19   Historical Provider, MD Kinza Duarte LANCETS 24S MISC 1 SYRINGE BY MISCELLANEOUS ROUTE 2 TIMES A DAY AS NEEDED DX E11.9 LAST O/V 10/20/18 4/22/19   Historical Provider, MD   oxybutynin (DITROPAN-XL) 10 MG 24 hr tablet Take 10 mg by mouth daily 4/22/19   Historical Provider, MD   valsartan (DIOVAN) 320 MG tablet  11/12/21   Historical Provider, MD   hydrochlorothiazide (HYDRODIURIL) 25 mg tablet  11/12/21 11/14/23  Historical Provider, MD   Trulicity 9.59 YVONNE/4.6KT SOPN  12/3/21 11/14/23  Historical Provider, MD     I have reviewed home medications with patient family member. Allergies: Allergies   Allergen Reactions    Ace Inhibitors      Other reaction(s): cough    Metformin      Other reaction(s): severe constipation    Penicillins Hives    Sulfa Antibiotics      Unknown reaction       Social History:  Marital Status:     Occupation: Retired   Patient Pre-hospital Living Situation: Home  Patient Pre-hospital Level of Mobility: walks  Patient Pre-hospital Diet Restrictions: diabetic   Substance Use History:   Social History     Substance and Sexual Activity   Alcohol Use Not Currently    Alcohol/week: 1.0 standard drink of alcohol    Types: 1 Glasses of wine per week     Social History     Tobacco Use   Smoking Status Former    Packs/day: 1.00    Years: 20.00    Total pack years: 20.00    Types: Cigarettes    Start date: 56    Quit date: 80    Years since quittin.9   Smokeless Tobacco Former     Social History     Substance and Sexual Activity   Drug Use Never       Family History:  Family History   Problem Relation Age of Onset    Asthma Mother     Asthma Father     Diabetes Father     Arthritis Father     Hypertension Father     Lung cancer Sister     Early death Brother 39    COPD Daughter     Asthma Daughter     Rheum arthritis Daughter     Cataracts Daughter     Liver cancer Son     Alzheimer's disease Sister     Rectal cancer Sister     Thyroid cancer Sister     No Known Problems Sister     Asthma Son        Physical Exam:     Vitals:   Blood Pressure: 143/72 (23 1500)  Pulse: 94 (23 1500)  Temperature: 98.4 °F (36.9 °C) (23 1115)  Temp Source: Oral (23 1115)  Respirations: 17 (23 1500)  Height: 5' 1" (154.9 cm) (23 1445)  Weight - Scale: 74.3 kg (163 lb 12.8 oz) (23 1117)  SpO2: 99 % (23 1500)    Physical Exam  Vitals and nursing note reviewed. Constitutional:       General: She is not in acute distress. Comments: On RA, no respiratory distress or coughing    Cardiovascular:      Rate and Rhythm: Normal rate. Pulmonary:      Breath sounds: Wheezing (expiratory) present. Abdominal:      General: Bowel sounds are normal. There is no distension. Palpations: Abdomen is soft. Tenderness: There is no abdominal tenderness. Musculoskeletal:      Right lower leg: No edema. Left lower leg: No edema. Neurological:      Mental Status: She is oriented to person, place, and time.    Psychiatric:         Mood and Affect: Mood normal.          Additional Data:     Lab Results:  Results from last 7 days   Lab Units 11/14/23  1226   WBC Thousand/uL 6.87   HEMOGLOBIN g/dL 14.7   HEMATOCRIT % 45.7   PLATELETS Thousands/uL 186   NEUTROS PCT % 57   LYMPHS PCT % 28   MONOS PCT % 8   EOS PCT % 6     Results from last 7 days   Lab Units 11/14/23  1226   SODIUM mmol/L 138   POTASSIUM mmol/L 4.7   CHLORIDE mmol/L 107   CO2 mmol/L 27   BUN mg/dL 25   CREATININE mg/dL 1.71*   ANION GAP mmol/L 4   CALCIUM mg/dL 9.5   ALBUMIN g/dL 3.8   TOTAL BILIRUBIN mg/dL 0.49   ALK PHOS U/L 77   ALT U/L 17   AST U/L 22   GLUCOSE RANDOM mg/dL 127                       Lines/Drains:  Invasive Devices       Peripheral Intravenous Line  Duration             Peripheral IV 11/14/23 Right Antecubital <1 day                        Imaging: Personally reviewed the following imaging: chest xray  XR chest 1 view portable   ED Interpretation by Maryam Babb MD (11/14 4794)   No acute cardiopulmonary process. Interpreted by me. Final Result by Gage Roca MD (11/14 6747)      Slight vascular congestion. Workstation performed: [id number]             EKG and Other Studies Reviewed on Admission:   EKG: NSR. HR 84.    ** Please Note: This note has been constructed using a voice recognition system.  **

## 2023-11-14 NOTE — ASSESSMENT & PLAN NOTE
BP elevated upon admission as she did not take AM medications   Home meds include Valsartan 320 mg daily, subbed losartan here

## 2023-11-14 NOTE — ASSESSMENT & PLAN NOTE
Documented back at least til 2018  Monitor mental status closely while hospitalized and with steroids  Redirect unwanted patient behaviors as first line tx  Reorient patient frequently

## 2023-11-14 NOTE — RESPIRATORY THERAPY NOTE
RT Protocol Note  Nidia Mera 80 y.o. female MRN: [id number]  Unit/Bed#: S -01 Encounter: [id number]    Assessment    Principal Problem:    COPD (chronic obstructive pulmonary disease) with chronic bronchitis  Active Problems:    DM2 (diabetes mellitus, type 2) (HCC)    Essential hypertension    Hypothyroidism    Chronic cough    CKD (chronic kidney disease)    Chest pain    Dysphagia      Home Pulmonary Medications:  Albuterol inhaler PRN, Albuterol (Accuneb) 1.25 MG/3 ML nebulizer, Xopenex 0.63 mg/3 mL nebulizer   Home Devices/Therapy: Other (Comment) (none)      Subjective    Subjective Data: Patient offers no complaints at this time. Objective    Physical Exam:   Assessment Type: Assess only  General Appearance: Alert, Awake  Respiratory Pattern: Normal  Chest Assessment: Chest expansion symmetrical  Bilateral Breath Sounds: Diminished  Cough: Non-productive  O2 Device: RA    Vitals:  Blood pressure 144/84, pulse (!) 115, temperature 98.4 °F (36.9 °C), temperature source Oral, resp. rate 18, height 5' 1" (1.549 m), weight 74.3 kg (163 lb 12.8 oz), SpO2 97 %, not currently breastfeeding. Imaging and other studies: I have personally reviewed pertinent reports. O2 Device: RA     Plan    Respiratory Plan: Mild Distress pathway, Home Bronchodilator Patient pathway  Airway Clearance Plan: Flutter     Resp Comments: Assessed patient per protocol. Patient denies home oxygen use, bipap and cpap use. Patient does use a PRN inhaler for SOB. Patient also has a nebulizer she is supposed to use BID but uses mostly at night. Patient unsure if ordered xopenex or albuterol nebulizer both listed on home meds. Patient admitted for cough which she claims has had for weeks. Patient does have diagnosis of COPD. Per protocol, Patient ordered on TID xopenex/atrovent with a PRN Q4 albuterol inhaler. BBS diminished. Patient instructed on use of flutter for [facility]. Patient currently on RA 97%.

## 2023-11-14 NOTE — CASE MANAGEMENT
Case Management Assessment    Patient name Symone Has  Location ED-35/ED-35 MRN [id number]  : 1928 Date 2023       Current Admission Date: 2023  Current Admission Diagnosis:COPD (chronic obstructive pulmonary disease) with chronic bronchitis   Patient Active Problem List    Diagnosis Date Noted    CKD (chronic kidney disease) 2023    Chest pain 2023    Dysphagia 2023    Chronic cough     Lichen sclerosus     Carotid stenosis, asymptomatic, left 2019    Uncontrolled type 2 diabetes mellitus with chronic kidney disease 2019    Diabetes mellitus with peripheral artery disease ([address]) 2019    Moderate persistent asthma with status asthmaticus 2018    Lumbar radiculopathy 2018    DDD (degenerative disc disease), lumbar 2018    Spinal stenosis of lumbar region 2018    Hyperlipidemia 2018    DM2 (diabetes mellitus, type 2) ([address]) 2018    Overactive bladder 2018    Cognitive impairment 2018    Ambulatory dysfunction 2018    Physical deconditioning 2018    Constipation 2018    Acute kidney injury superimposed on chronic kidney disease  2018    Essential hypertension 2018    Hypothyroidism 2018    COPD (chronic obstructive pulmonary disease) with chronic bronchitis 2018    Osteopenia of multiple sites 2018    Mild renal insufficiency 2017    Peripheral vascular disease ([address]) 2016    OA (osteoarthritis) 2014    Asthma 2014    Sickle cell trait ([address]) 2014      LOS (days): 0  Geometric Mean LOS (GMLOS) (days):   Days to GMLOS:     OBJECTIVE:    Risk of Unplanned Readmission Score: 10.85         Current admission status: Inpatient       Preferred Pharmacy:   [facility] [address] [place], [place] [address]  Phone: [phone] Fax: [phone]    Primary Care Provider: Jeffrey Brink DO    Primary Insurance: MEDICARE  Secondary Insurance: BLUE CROSS    ASSESSMENT:  Active Health Care Proxies    There are no active Health Care Proxies on file. Readmission Root Cause  30 Day Readmission: No    Patient Information  Admitted from[de-identified] Home  Mental Status: Alert  During Assessment patient was accompanied by: Daughter  Assessment information provided by[de-identified] Patient, Daughter  Support Systems: Daughter  What city do you live in?: [facility] entry access options.  Select all that apply.: Stairs  Number of steps to enter home.: 4  Do the steps have railings?: Yes  Type of Current Residence: 2 story home  Upon entering residence, is there a bedroom on the main floor (no further steps)?: No  A bedroom is located on the following floor levels of residence (select all that apply):: 2nd Floor  Upon entering residence, is there a bathroom on the main floor (no further steps)?: Yes  Number of steps to 2nd floor from main floor: One Flight (Used glide chair)  In the last 12 months, was there a time when you were not able to pay the mortgage or rent on time?: No  In the last 12 months, how many places have you lived?: 1  In the last 12 months, was there a time when you did not have a steady place to sleep or slept in a shelter (including now)?: No  Living Arrangements: Lives w/ Daughter    Activities of Daily Living Prior to Admission  Functional Status: Assistance  Completes ADLs independently?: No  Level of ADL dependence: Assistance  Does patient use assisted devices?: Yes  Assisted Devices (DME) used: Odell Ty Chair/[employer]man  Does patient currently own DME?: Yes  What DME does the patient currently own?: Stair Chair/Whitman, Walker  Does patient have a history of Outpatient Therapy (PT/OT)?: No  Does the patient have a history of Short-Term Rehab?: No  Does patient have a history of HHC?: No  Does patient currently have [facility]?: No         Patient Information Continued  Income Source: Pension/alf  Does patient have prescription coverage?: Yes  Within the past 12 months, you worried that your food would run out before you got the money to buy more.: Never true  Within the past 12 months, the food you bought just didn't last and you didn't have money to get more.: Never true  Does patient receive dialysis treatments?: No  Does patient have a history of substance abuse?: No  Does patient have a history of Mental Health Diagnosis?: No         Means of Transportation  Means of Transport to Appts[de-identified] Family transport  In the past 12 months, has lack of transportation kept you from medical appointments or from getting medications?: No  In the past 12 months, has lack of transportation kept you from meetings, work, or from getting things needed for daily living?: No

## 2023-11-14 NOTE — PLAN OF CARE
Problem: Potential for Falls  Goal: Patient will remain free of falls  Description: INTERVENTIONS:  - Educate patient/family on patient safety including physical limitations  - Instruct patient to call for assistance with activity   - Consult OT/PT to assist with strengthening/mobility   - Keep Call bell within reach  - Keep bed low and locked with side rails adjusted as appropriate  - Keep care items and personal belongings within reach  - Initiate and maintain comfort rounds  - Make Fall Risk Sign visible to staff  - Offer Toileting every 2 Hours, in advance of need  - Initiate/Maintain bed alarm  - Obtain necessary fall risk management equipment  - Apply yellow socks and bracelet for high fall risk patients  - Consider moving patient to room near nurses station  Outcome: Progressing     Problem: RESPIRATORY - ADULT  Goal: Achieves optimal ventilation and oxygenation  Description: INTERVENTIONS:  - Assess for changes in respiratory status  - Assess for changes in mentation and behavior  - Position to facilitate oxygenation and minimize respiratory effort  - Oxygen administered by appropriate delivery if ordered  - Initiate smoking cessation education as indicated  - Encourage broncho-pulmonary hygiene including cough, deep breathe, Incentive Spirometry  - Assess the need for suctioning and aspirate as needed  - Assess and instruct to report SOB or any respiratory difficulty  - Respiratory Therapy support as indicated  Outcome: Progressing     Problem: INFECTION - ADULT  Goal: Absence or prevention of progression during hospitalization  Description: INTERVENTIONS:  - Assess and monitor for signs and symptoms of infection  - Monitor lab/diagnostic results  - Monitor all insertion sites, i.e. indwelling lines, tubes, and drains  - Monitor endotracheal if appropriate and nasal secretions for changes in amount and color  - [place] appropriate cooling/warming therapies per order  - Administer medications as ordered  - Instruct and encourage patient and family to use good hand hygiene technique  - Identify and instruct in appropriate isolation precautions for identified infection/condition  Outcome: Progressing  Goal: Absence of fever/infection during neutropenic period  Description: INTERVENTIONS:  - Monitor WBC    Outcome: Progressing

## 2023-11-14 NOTE — ED PROVIDER NOTES
History  Chief Complaint   Patient presents with    Cough     Worsening cough, wheezing, hx COPD, no O2 at home     Patient is a 51-year-old female with COPD and CKD who presented to the ED for cough. Patient states she normally has chronic cough due to COPD but has noticed its been significantly worse for the last several weeks and for the last couple days has been nonstop. Patient has been using nebulizer treatment much more frequently and endorses transient SOB. She also endorses wheezing. Denies fevers chills or any systemic symptoms. Also endorses some transient chest pain for the last few months, has been attributing it to indigestion and cough. States she had 6/10 chest pain this morning, currently none. She does not use oxygen at home. On evaluation, patient was well appearing and did not appear dyspnic but did have cough and diffuse end expiratory wheezes were heard b/l. Prior to Admission Medications   Prescriptions Last Dose Informant Patient Reported? Taking?    BD PEN NEEDLE SKYLER U/F 32G X 4 MM MISC  Child Yes No   Si SYRINGE BY MISCELLANEOUS ROUTE DAILY   Calcium Carb-Cholecalciferol (CALTRATE 600+D3 SOFT PO)  Child Yes No   Sig: Take 2 tablets by mouth   Dulaglutide 1.5 MG/0.5ML SOPN  Child Yes No   Sig: Inject 1.5 mg under the skin once a week   JARDIANCE 10 MG TABS  Child Yes No   Sig: Take 10 mg by mouth daily   Multiple Vitamin (multivitamin) tablet   Yes Yes   Sig: Take 1 tablet by mouth daily   ONE TOUCH ULTRA TEST test strip  Child Yes No   Sig: USE FOR BLOOD GLUCOSE TESTING AS DIRECTED TWICE DAILY   ONETOUCH DELICA LANCETS 52B MISC  Child Yes No   Si SYRINGE BY MISCELLANEOUS ROUTE 2 TIMES A DAY AS NEEDED DX E11.9 LAST O/V 10/20/18   albuterol (ACCUNEB) 1.25 MG/3ML nebulizer solution   Yes Yes   Sig: Take 1.25 mg by nebulization 2 (two) times a day   albuterol (PROVENTIL HFA,VENTOLIN HFA) 90 mcg/act inhaler  Child Yes No   Sig: Inhale 2 puffs every 4 (four) hours as needed aspirin (ECOTRIN LOW STRENGTH) 81 mg EC tablet  Child Yes No   Sig: Take 81 mg by mouth daily   atorvastatin (LIPITOR) 40 mg tablet  Child Yes No   Sig: Take 40 mg by mouth daily   benzonatate (TESSALON) 200 MG capsule  Child No No   Sig: Take 1 capsule (200 mg total) by mouth 3 (three) times a day as needed for cough   clobetasol (TEMOVATE) 0.05 % ointment   No No   Sig: Apply topically daily at bedtime   docusate sodium (COLACE) 100 mg capsule  Child Yes No   Sig: Take 100 mg by mouth 1 (one) time   fluticasone (FLONASE) 50 mcg/act nasal spray  Child Yes No   Si spray into each nostril   fluticasone-umeclidinium-vilanterol (Trelegy Ellipta) 200-62.5-25 mcg/actuation AEPB inhaler  Child No No   Sig: Inhale 1 puff daily Rinse mouth after use.    insulin glargine (LANTUS SOLOSTAR) 100 units/mL injection pen  Child Yes No   Sig: Inject 10 Units under the skin daily at bedtime   ipratropium (ATROVENT) 0.06 % nasal spray  Child No No   Si sprays into each nostril 2 (two) times a day   levalbuterol (XOPENEX) 0.63 mg/3 mL nebulizer solution  Child No No   Sig: Take 3 mL (0.63 mg total) by nebulization 3 (three) times a day   Patient taking differently: Take 0.63 mg by nebulization 2 (two) times a day   levothyroxine 100 mcg tablet  Child Yes No   Sig: Take 100 mcg by mouth daily   loratadine (CLARITIN) 10 mg tablet  Child Yes No   Sig: Take 10 mg by mouth daily   oxybutynin (DITROPAN-XL) 10 MG 24 hr tablet  Child Yes No   Sig: Take 10 mg by mouth daily   valsartan (DIOVAN) 320 MG tablet  Child Yes No      Facility-Administered Medications: None       Past Medical History:   Diagnosis Date    Arthritis     Asthma     COPD (chronic obstructive pulmonary disease) ([facility])     Diabetes mellitus ([address])     Difficulty walking     Gout     Hyperlipidemia     Hypertension     Hypothyroidism     Neuropathy in diabetes ([facility])     Overactive bladder     PVD (peripheral vascular disease) ([facility])     Verruca        Past Surgical History:   Procedure Laterality Date    ARTERIAL BYPASS SURGERY      CAROTID ENDARTERECTOMY Right     CATARACT EXTRACTION      HYSTERECTOMY      NEPHRECTOMY      in her 25s       Family History   Problem Relation Age of Onset    Asthma Mother     Asthma Father     Diabetes Father     Arthritis Father     Hypertension Father     Lung cancer Sister     Early death Brother 39    COPD Daughter     Asthma Daughter     Rheum arthritis Daughter     Cataracts Daughter     Liver cancer Son     Alzheimer's disease Sister     Rectal cancer Sister     Thyroid cancer Sister     No Known Problems Sister     Asthma Son      I have reviewed and agree with the history as documented. E-Cigarette/Vaping    E-Cigarette Use Never User      E-Cigarette/Vaping Substances    Nicotine No     THC No     CBD No     Flavoring No     Other No     Unknown No      Social History     Tobacco Use    Smoking status: Former     Packs/day: 1.00     Years: 20.00     Total pack years: 20.00     Types: Cigarettes     Start date:      Quit date:      Years since quittin.9    Smokeless tobacco: Former   Vaping Use    Vaping Use: Never used   Substance Use Topics    Alcohol use: Not Currently     Alcohol/week: 1.0 standard drink of alcohol     Types: 1 Glasses of wine per week    Drug use: Never        Review of Systems   Constitutional:  Positive for fatigue. Negative for chills. HENT:  Negative for congestion, rhinorrhea and sneezing. Respiratory:  Positive for cough, shortness of breath and wheezing. Cardiovascular:  Positive for chest pain. Negative for palpitations. Gastrointestinal:  Negative for abdominal pain and nausea. Genitourinary:  Negative for dysuria, frequency and urgency. Skin:  Negative for color change and pallor. Neurological:  Negative for syncope and headaches. Psychiatric/Behavioral:  Negative for agitation, behavioral problems and confusion.     All other systems reviewed and are negative. Physical Exam  ED Triage Vitals   Temperature Pulse Respirations Blood Pressure SpO2   11/14/23 1115 11/14/23 1116 11/14/23 1115 11/14/23 1116 11/14/23 1115   98.4 °F (36.9 °C) (!) 113 20 (!) 178/84 100 %      Temp Source Heart Rate Source Patient Position - Orthostatic VS BP Location FiO2 (%)   11/14/23 1115 11/14/23 1226 11/14/23 1226 11/14/23 1226 --   Oral Monitor Lying Right arm       Pain Score       11/14/23 1226       No Pain             Orthostatic Vital Signs  Vitals:    11/14/23 1445 11/14/23 1500 11/14/23 1644 11/14/23 1649   BP: 155/76 143/72 (!) 158/118 144/84   Pulse: 94 94 (!) 115    Patient Position - Orthostatic VS: Lying Lying Sitting Sitting       Physical Exam  Vitals and nursing note reviewed. Constitutional:       Appearance: Normal appearance. HENT:      Head: Normocephalic and atraumatic. Nose: Nose normal.      Mouth/Throat:      Mouth: Mucous membranes are moist.      Pharynx: Oropharynx is clear. Eyes:      Extraocular Movements: Extraocular movements intact. Conjunctiva/sclera: Conjunctivae normal.      Pupils: Pupils are equal, round, and reactive to light. Cardiovascular:      Rate and Rhythm: Normal rate and regular rhythm. Pulses: Normal pulses. Heart sounds: Normal heart sounds. Pulmonary:      Breath sounds: Wheezing present. Comments: Diffuse bilateral end expiratory wheezes  Abdominal:      General: Abdomen is flat. Bowel sounds are normal.      Palpations: Abdomen is soft. Tenderness: There is no abdominal tenderness. Skin:     General: Skin is warm and dry. Capillary Refill: Capillary refill takes less than 2 seconds. Neurological:      General: No focal deficit present. Mental Status: She is alert and oriented to person, place, and time. Psychiatric:         Mood and Affect: Mood normal.         Behavior: Behavior normal.         Thought Content:  Thought content normal.         ED Medications  Medications insulin lispro (HumaLOG) 100 units/mL subcutaneous injection 1-5 Units (1 Units Subcutaneous Given 11/14/23 1557)   insulin lispro (HumaLOG) 100 units/mL subcutaneous injection 1-5 Units (has no administration in time range)   aspirin (ECOTRIN LOW STRENGTH) EC tablet 81 mg (has no administration in time range)   atorvastatin (LIPITOR) tablet 40 mg (has no administration in time range)   benzonatate (TESSALON PERLES) capsule 200 mg (200 mg Oral Given 11/14/23 1758)   clobetasol (TEMOVATE) 0.05 % cream (has no administration in time range)   fluticasone (FLONASE) 50 mcg/act nasal spray 1 spray (has no administration in time range)   fluticasone-vilanterol 200-25 mcg/actuation 1 puff (has no administration in time range)   insulin glargine (LANTUS) subcutaneous injection 15 Units 0.15 mL (has no administration in time range)   ipratropium (ATROVENT) 0.06 % nasal spray 2 spray (2 sprays Each Nare Not Given 11/14/23 1801)   Empagliflozin (JARDIANCE) tablet 10 mg (has no administration in time range)   levothyroxine tablet 100 mcg (has no administration in time range)   loratadine (CLARITIN) tablet 10 mg (has no administration in time range)   multivitamin stress formula tablet 1 tablet (has no administration in time range)   oxybutynin (DITROPAN-XL) 24 hr tablet 10 mg (has no administration in time range)   losartan (COZAAR) tablet 100 mg (has no administration in time range)   acetaminophen (TYLENOL) tablet 650 mg (has no administration in time range)   guaiFENesin (MUCINEX) 12 hr tablet 600 mg (600 mg Oral Given 11/14/23 1759)   methylPREDNISolone sodium succinate (Solu-MEDROL) injection 40 mg (has no administration in time range)   heparin (porcine) subcutaneous injection 5,000 Units (has no administration in time range)   azithromycin (ZITHROMAX) tablet 500 mg (500 mg Oral Given 11/14/23 1758)   pantoprazole (PROTONIX) EC tablet 40 mg (has no administration in time range)   albuterol (PROVENTIL HFA,VENTOLIN HFA) inhaler 2 puff (has no administration in time range)   levalbuterol (XOPENEX) inhalation solution 1.25 mg (has no administration in time range)   ipratropium (ATROVENT) 0.02 % inhalation solution 0.5 mg (has no administration in time range)   ipratropium-albuterol (DUO-NEB) 0.5-2.5 mg/3 mL inhalation solution 3 mL (3 mL Nebulization Given 11/14/23 1205)   methylPREDNISolone sodium succinate (Solu-MEDROL) injection 125 mg (125 mg Intravenous Given 11/14/23 1230)   furosemide (LASIX) injection 20 mg (20 mg Intravenous Given 11/14/23 1759)       Diagnostic Studies  Results Reviewed       Procedure Component Value Units Date/Time    HS Troponin I 4hr [[id number]]  (Normal) Collected: 11/14/23 1659    Lab Status: Final result Specimen: Blood from Arm, Left Updated: 11/14/23 1752     hs TnI 4hr 16 ng/L      Delta 4hr hsTnI 9 ng/L     Fingerstick Glucose (POCT) [[id number]]  (Abnormal) Collected: 11/14/23 1553    Lab Status: Final result Updated: 11/14/23 1554     POC Glucose 185 mg/dl     Procalcitonin [[id number]]  (Normal) Collected: 11/14/23 1226    Lab Status: Final result Specimen: Blood from Arm, Right Updated: 11/14/23 1526     Procalcitonin 0.13 ng/ml     B-Type Natriuretic Peptide(BNP) [[id number]]  (Normal) Collected: 11/14/23 1226    Lab Status: Final result Specimen: Blood from Arm, Right Updated: 11/14/23 1525     BNP 11 pg/mL     HS Troponin I 2hr [[id number]]  (Normal) Collected: 11/14/23 1444    Lab Status: Final result Specimen: Blood from Arm, Right Updated: 11/14/23 1517     hs TnI 2hr 9 ng/L      Delta 2hr hsTnI 2 ng/L     FLU/RSV/COVID - if FLU/RSV clinically relevant [[id number]]  (Normal) Collected: 11/14/23 1233    Lab Status: Final result Specimen: Nares from Nose Updated: 11/14/23 1356     SARS-CoV-2 Negative     INFLUENZA A PCR Negative     INFLUENZA B PCR Negative     RSV PCR Negative    Narrative:      FOR PEDIATRIC PATIENTS - copy/paste COVID Guidelines URL to browser: [email or web address]    SARS-CoV-2 assay is a Nucleic Acid Amplification assay intended for the  qualitative detection of nucleic acid from SARS-CoV-2 in nasopharyngeal  swabs. Results are for the presumptive identification of SARS-CoV-2 RNA. Positive results are indicative of infection with SARS-CoV-2, the virus  causing COVID-19, but do not rule out bacterial infection or co-infection  with other viruses. Laboratories within the [facility] and its  territories are required to report all positive results to the appropriate  public health authorities. Negative results do not preclude SARS-CoV-2  infection and should not be used as the sole basis for treatment or other  patient management decisions. Negative results must be combined with  clinical observations, patient history, and epidemiological information. This test has not been FDA cleared or approved. This test has been authorized by FDA under an Emergency Use Authorization  (EUA). This test is only authorized for the duration of time the  declaration that circumstances exist justifying the authorization of the  emergency use of an in vitro diagnostic tests for detection of SARS-CoV-2  virus and/or diagnosis of COVID-19 infection under section 564(b)(1) of  the Act, 21 U. S.C. 623XEM-7(G)(8), unless the authorization is terminated  or revoked sooner. The test has been validated but independent review by FDA  and CLIA is pending. Test performed using [employer] GeneXpert: This RT-PCR assay targets N2,  a region unique to SARS-CoV-2. A conserved region in the E-gene was chosen  for pan-Sarbecovirus detection which includes SARS-CoV-2. According to CMS-2020-01-R, this platform meets the definition of high-throughput technology.     HS Troponin 0hr (reflex protocol) [[id number]]  (Normal) Collected: 11/14/23 1226    Lab Status: Final result Specimen: Blood from Arm, Right Updated: 11/14/23 1315     hs TnI 0hr 7 ng/L     Comprehensive metabolic panel [[id number]]  (Abnormal) Collected: 11/14/23 1226    Lab Status: Final result Specimen: Blood from Arm, Right Updated: 11/14/23 1252     Sodium 138 mmol/L      Potassium 4.7 mmol/L      Chloride 107 mmol/L      CO2 27 mmol/L      ANION GAP 4 mmol/L      BUN 25 mg/dL      Creatinine 1.71 mg/dL      Glucose 127 mg/dL      Calcium 9.5 mg/dL      AST 22 U/L      ALT 17 U/L      Alkaline Phosphatase 77 U/L      Total Protein 7.5 g/dL      Albumin 3.8 g/dL      Total Bilirubin 0.49 mg/dL      eGFR 25 ml/min/1.73sq m     Narrative:      National Kidney Disease Foundation guidelines for Chronic Kidney Disease (CKD):     Stage 1 with normal or high GFR (GFR > 90 mL/min/1.73 square meters)    Stage 2 Mild CKD (GFR = 60-89 mL/min/1.73 square meters)    Stage 3A Moderate CKD (GFR = 45-59 mL/min/1.73 square meters)    Stage 3B Moderate CKD (GFR = 30-44 mL/min/1.73 square meters)    Stage 4 Severe CKD (GFR = 15-29 mL/min/1.73 square meters)    Stage 5 End Stage CKD (GFR <15 mL/min/1.73 square meters)  Note: GFR calculation is accurate only with a steady state creatinine    CBC and differential [[id number]]  (Abnormal) Collected: 11/14/23 1226    Lab Status: Final result Specimen: Blood from Arm, Right Updated: 11/14/23 1244     WBC 6.87 Thousand/uL      RBC 5.26 Million/uL      Hemoglobin 14.7 g/dL      Hematocrit 45.7 %      MCV 87 fL      MCH 27.9 pg      MCHC 32.2 g/dL      RDW 15.2 %      MPV 9.5 fL      Platelets 525 Thousands/uL      nRBC 0 /100 WBCs      Neutrophils Relative 57 %      Immat GRANS % 0 %      Lymphocytes Relative 28 %      Monocytes Relative 8 %      Eosinophils Relative 6 %      Basophils Relative 1 %      Neutrophils Absolute 3.91 Thousands/µL      Immature Grans Absolute 0.03 Thousand/uL      Lymphocytes Absolute 1.90 Thousands/µL      Monocytes Absolute 0.53 Thousand/µL      Eosinophils Absolute 0.43 Thousand/µL      Basophils Absolute 0.07 Thousands/µL XR chest 1 view portable   ED Interpretation by Elena Barrios MD (11/14 [id number])   No acute cardiopulmonary process. Interpreted by me. Final Result by Radha Kasper MD (11/14 5601)      Slight vascular congestion. Workstation performed: [id number]               Procedures  Procedures      ED Course  ED Course as of 11/14/23 2019 Tue Nov 14, 2023   1203 Duoneb given   1403 Comprehensive metabolic panel(!)  Grossly unremarkable other than Cr but is at patient's baseline   1403 2nd dose duoneb given with good response   1403 Significant improvement with 2 DuoNeb's and methylprednisolone but patient remains with some cough and shortness of breath would like to be admitted. Will admit for COPD exacerbation management. 1404 XR chest 1 view portable  No focal consolidation                             SBIRT 20yo+      Flowsheet Row Most Recent Value   Initial Alcohol Screen: US AUDIT-C     1. How often do you have a drink containing alcohol? 0 Filed at: 11/14/2023 1117   2. How many drinks containing alcohol do you have on a typical day you are drinking? 0 Filed at: 11/14/2023 1117   3b. FEMALE Any Age, or MALE 65+: How often do you have 4 or more drinks on one occassion? 0 Filed at: 11/14/2023 1117   Audit-C Score 0 Filed at: 11/14/2023 1117   JENNIFER: How many times in the past year have you. .. Used an illegal drug or used a prescription medication for non-medical reasons? Never Filed at: 11/14/2023 1117                  Medical Decision Making  Patient is a 26-year-old female with COPD and CKD who presented to the ED for cough. Patient states she normally has chronic cough due to COPD but has noticed its been significantly worse for the last several weeks and for the last couple days has been nonstop. Patient has been using nebulizer treatment much more frequently and endorses transient SOB. She also endorses wheezing. Denies fevers chills or any systemic symptoms. Also endorses some transient chest pain for the last few months, has been attributing it to indigestion and cough. States she had 6/10 chest pain this morning, currently none. She does not use oxygen at home. On evaluation, patient was well appearing and did not appear dyspnic but did have cough and diffuse end expiratory wheezes were heard b/l. Patient had unremarkable CBC and CMP. Chest x-ray not suggestive of pneumonia. EKG and troponin unremarkable, ACS unlikely. Significant improvement after 2 doses of DuoNeb as well as steroids. However, patient remains with some shortness of breath and cough. Patient admitted to medicine for further work-up and management of COPD exacerbation. Amount and/or Complexity of Data Reviewed  Labs: ordered. Decision-making details documented in ED Course. Radiology: ordered and independent interpretation performed. Decision-making details documented in ED Course. Risk  Prescription drug management. Decision regarding hospitalization. Disposition  Final diagnoses:   COPD exacerbation ([address])     Time reflects when diagnosis was documented in both MDM as applicable and the Disposition within this note       Time User Action Codes Description Comment    11/14/2023  2:19 PM Lucita Watson Add [J44.1] COPD exacerbation [facility])           ED Disposition       ED Disposition   Admit    Condition   Stable    Date/Time   Tue Nov 14, 2023 1418    Comment   Case was discussed with SLIM and the patient's admission status was agreed to be Admission Status: inpatient status to the service of Dr. Esteban Hoffman .                Follow-up Information    None         Current Discharge Medication List        CONTINUE these medications which have NOT CHANGED    Details   albuterol (ACCUNEB) 1.25 MG/3ML nebulizer solution Take 1.25 mg by nebulization 2 (two) times a day      Multiple Vitamin (multivitamin) tablet Take 1 tablet by mouth daily      albuterol (PROVENTIL HFA,VENTOLIN HFA) 90 mcg/act inhaler Inhale 2 puffs every 4 (four) hours as needed  Refills: 0    Comments: <!--EPICS-->Substitution to a formulary equivalent within the same pharmaceutical class is authorized. <!--EPICE-->      aspirin (ECOTRIN LOW STRENGTH) 81 mg EC tablet Take 81 mg by mouth daily      atorvastatin (LIPITOR) 40 mg tablet Take 40 mg by mouth daily      BD PEN NEEDLE SKYLER U/F 32G X 4 MM MISC 1 SYRINGE BY MISCELLANEOUS ROUTE DAILY  Refills: 0      benzonatate (TESSALON) 200 MG capsule Take 1 capsule (200 mg total) by mouth 3 (three) times a day as needed for cough  Qty: 90 capsule, Refills: 1    Associated Diagnoses: COPD (chronic obstructive pulmonary disease) with chronic bronchitis      Calcium Carb-Cholecalciferol (CALTRATE 600+D3 SOFT PO) Take 2 tablets by mouth      clobetasol (TEMOVATE) 0.05 % ointment Apply topically daily at bedtime  Qty: 45 g, Refills: 3    Associated Diagnoses: Lichen sclerosus      docusate sodium (COLACE) 100 mg capsule Take 100 mg by mouth 1 (one) time      Dulaglutide 1.5 MG/0.5ML SOPN Inject 1.5 mg under the skin once a week      fluticasone (FLONASE) 50 mcg/act nasal spray 1 spray into each nostril      fluticasone-umeclidinium-vilanterol (Trelegy Ellipta) 200-62.5-25 mcg/actuation AEPB inhaler Inhale 1 puff daily Rinse mouth after use.   Qty: 60 blister, Refills: 3    Associated Diagnoses: COPD (chronic obstructive pulmonary disease) with chronic bronchitis      insulin glargine (LANTUS SOLOSTAR) 100 units/mL injection pen Inject 10 Units under the skin daily at bedtime      ipratropium (ATROVENT) 0.06 % nasal spray 2 sprays into each nostril 2 (two) times a day  Qty: 15 mL, Refills: 3    Associated Diagnoses: COPD (chronic obstructive pulmonary disease) with chronic bronchitis      JARDIANCE 10 MG TABS Take 10 mg by mouth daily  Refills: 0      levalbuterol (XOPENEX) 0.63 mg/3 mL nebulizer solution Take 3 mL (0.63 mg total) by nebulization 3 (three) times a day  Qty: 180 mL, Refills: 3 Associated Diagnoses: COPD (chronic obstructive pulmonary disease) with chronic bronchitis      levothyroxine 100 mcg tablet Take 100 mcg by mouth daily      loratadine (CLARITIN) 10 mg tablet Take 10 mg by mouth daily      ONE TOUCH ULTRA TEST test strip USE FOR BLOOD GLUCOSE TESTING AS DIRECTED TWICE DAILY  Refills: 1      ONETOUCH DELICA LANCETS 44L MISC 1 SYRINGE BY MISCELLANEOUS ROUTE 2 TIMES A DAY AS NEEDED DX E11.9 LAST O/V 10/20/18  Refills: 1      oxybutynin (DITROPAN-XL) 10 MG 24 hr tablet Take 10 mg by mouth daily  Refills: 0      valsartan (DIOVAN) 320 MG tablet            No discharge procedures on file. PDMP Review       None             ED Provider  Attending physically available and evaluated Germain Zuluaga. I managed the patient along with the ED Attending.     Electronically Signed by           Elena Barrios MD  11/14/23 1444       Elena Barrios MD  11/14/23 2019

## 2023-11-14 NOTE — ASSESSMENT & PLAN NOTE
Noted over past several months, attributed to chronic cough/dysphagia/"indigestion". Patient denies any history of CAD/CHF.    Initial troponin negative, EKG nonischemic  Will trend troponins  Continue home asa/statin  Discussed with patient and daughter that we could consider echocardiogram, however given advanced age will defer at this time per their preference

## 2023-11-14 NOTE — ASSESSMENT & PLAN NOTE
Lab Results   Component Value Date    HGBA1C 6.6 (H) 04/12/2023       No results for input(s): "POCGLU" in the last 72 hours.     Blood Sugar Average: Last 72 hrs:  Most recent A1C acceptable  Home regimen includes Trulicity once weekly, Jardiance 10 mg daily, Lantus 10 units at bedtime  Here will place on Lantus 15 units HS given steroids + SSI  Watch with steroids, expect hyperglycemia  Diabetic diet

## 2023-11-14 NOTE — ASSESSMENT & PLAN NOTE
Lab Results   Component Value Date    EGFR 25 11/14/2023    EGFR 28 07/01/2023    EGFR 16 04/12/2023    CREATININE 1.71 (H) 11/14/2023    CREATININE 1.56 (H) 07/01/2023    CREATININE 2.39 (H) 04/12/2023   Baseline widely variable but per review appears to be in 1.5-2.0 range as of past year or so  Currently at baseline, ok to continue ARB for now  Trial dose of IV lasix x1  Avoid hypotension/nephrotoxins  Monitor UOP

## 2023-11-14 NOTE — ASSESSMENT & PLAN NOTE
Presented with worsening productive cough x few weeks. Does have chronic cough as documented below. Suspect secondary to COPD exacerbation. Took azithromycin approx 2 weeks ago without improvement. Has been using home Trelegy and albuterol nebs without improvement. Endorses SOB with exertion.    CXR negative for focal infiltrate, COVID/Flu/RSV negative, No fevers or leukocytosis  Will check procal/BNP  Start IV steroids, wean to PO prednisone as able  Trial dose of IV lasix x1 given mild congestion on CXR  Continue Duonebs  Start azithromycin  Continue Trelegy  Supportive care with tessalon TID  Consult pulmonology, patient known to them

## 2023-11-14 NOTE — ASSESSMENT & PLAN NOTE
Chronic in nature, documented in pulmonology notes since at least May 2022. Has had speech eval per notes. Patient and daughter report worsening cough x3 weeks, productive of yellow sputum  Continue IV steroids, nebs.  Add tessalon/mucinex  Check speech eval given associated dysphagia--start PPI  Azithro as above  Check sputum cx if able   Continue flonase/Claritin

## 2023-11-14 NOTE — ASSESSMENT & PLAN NOTE
Patient and daughter report that occasionally food "gets stuck"  Appears patient has a hiatal hernia  Start PPI  Speech eval ordered   May contribute to chronic cough

## 2023-11-14 NOTE — ED ATTENDING ATTESTATION
11/14/2023  IFelipa DO, saw and evaluated the patient. I have discussed the patient with the resident/non-physician practitioner and agree with the resident's/non-physician practitioner's findings, Plan of Care, and MDM as documented in the resident's/non-physician practitioner's note, except where noted. All available labs and Radiology studies were reviewed. I was present for key portions of any procedure(s) performed by the resident/non-physician practitioner and I was immediately available to provide assistance. At this point I agree with the current assessment done in the Emergency Department. I have conducted an independent evaluation of this patient a history and physical is as follows:    ED Course   80year old female presents for evaluation of cough - worsening over 2 weeks. Notes significant worsening of the cough and dyspnea over the past 2 days. She has been using a nebulizer at home without relief. No fever or chills. No sick contacts. Also endorses intermittent chest pain over few months, had it last this am rated 5/10. Past medical history: COPD, chronic kidney disease    Physical exam: Elderly female resting in mild distress. Pupils are equal and reactive. Mucous membranes are moist.  Neck is supple. Heart is tachycardic, lungs with diffuse wheezes and rhonchi. Mild increased work of breathing. Abdomen is soft nontender, nondistended with normal active bowel sounds. No lower extremity edema    Plan: 45-year-old female presents with 2-week history of worsening cough and wheezing. Differential diagnosis includes but is not limited to acute exacerbation of COPD, COVID/influenza, pleural effusion, cardiac dysrhythmia, congestive heart failure, acute bronchitis    Plan: We will check chest x-ray, start beta agonist inhaler and corticosteroid for wheezing.   Check ekg, labs, reassess  Critical Care Time  Procedures

## 2023-11-15 LAB
ANION GAP SERPL CALCULATED.3IONS-SCNC: 10 MMOL/L
ATRIAL RATE: 84 BPM
BASOPHILS # BLD AUTO: 0.02 THOUSANDS/ÂΜL (ref 0–0.1)
BASOPHILS NFR BLD AUTO: 0 % (ref 0–1)
BUN SERPL-MCNC: 39 MG/DL (ref 5–25)
CALCIUM SERPL-MCNC: 9.4 MG/DL (ref 8.4–10.2)
CHLORIDE SERPL-SCNC: 102 MMOL/L (ref 96–108)
CO2 SERPL-SCNC: 24 MMOL/L (ref 21–32)
CREAT SERPL-MCNC: 1.96 MG/DL (ref 0.6–1.3)
EOSINOPHIL # BLD AUTO: 0 THOUSAND/ÂΜL (ref 0–0.61)
EOSINOPHIL NFR BLD AUTO: 0 % (ref 0–6)
ERYTHROCYTE [DISTWIDTH] IN BLOOD BY AUTOMATED COUNT: 15.1 % (ref 11.6–15.1)
GFR SERPL CREATININE-BSD FRML MDRD: 21 ML/MIN/1.73SQ M
GLUCOSE SERPL-MCNC: 291 MG/DL (ref 65–140)
GLUCOSE SERPL-MCNC: 305 MG/DL (ref 65–140)
GLUCOSE SERPL-MCNC: 320 MG/DL (ref 65–140)
GLUCOSE SERPL-MCNC: 359 MG/DL (ref 65–140)
GLUCOSE SERPL-MCNC: 368 MG/DL (ref 65–140)
HCT VFR BLD AUTO: 43.5 % (ref 34.8–46.1)
HGB BLD-MCNC: 14.6 G/DL (ref 11.5–15.4)
IMM GRANULOCYTES # BLD AUTO: 0.07 THOUSAND/UL (ref 0–0.2)
IMM GRANULOCYTES NFR BLD AUTO: 1 % (ref 0–2)
LYMPHOCYTES # BLD AUTO: 1.02 THOUSANDS/ÂΜL (ref 0.6–4.47)
LYMPHOCYTES NFR BLD AUTO: 12 % (ref 14–44)
MCH RBC QN AUTO: 29 PG (ref 26.8–34.3)
MCHC RBC AUTO-ENTMCNC: 33.6 G/DL (ref 31.4–37.4)
MCV RBC AUTO: 86 FL (ref 82–98)
MONOCYTES # BLD AUTO: 0.21 THOUSAND/ÂΜL (ref 0.17–1.22)
MONOCYTES NFR BLD AUTO: 2 % (ref 4–12)
NEUTROPHILS # BLD AUTO: 7.43 THOUSANDS/ÂΜL (ref 1.85–7.62)
NEUTS SEG NFR BLD AUTO: 85 % (ref 43–75)
NRBC BLD AUTO-RTO: 0 /100 WBCS
P AXIS: 68 DEGREES
PLATELET # BLD AUTO: 196 THOUSANDS/UL (ref 149–390)
PMV BLD AUTO: 10.2 FL (ref 8.9–12.7)
POTASSIUM SERPL-SCNC: 4.5 MMOL/L (ref 3.5–5.3)
PR INTERVAL: 210 MS
PROCALCITONIN SERPL-MCNC: 0.17 NG/ML
QRS AXIS: -64 DEGREES
QRSD INTERVAL: 118 MS
QT INTERVAL: 400 MS
QTC INTERVAL: 472 MS
RBC # BLD AUTO: 5.04 MILLION/UL (ref 3.81–5.12)
SODIUM SERPL-SCNC: 136 MMOL/L (ref 135–147)
T WAVE AXIS: 24 DEGREES
VENTRICULAR RATE: 84 BPM
WBC # BLD AUTO: 8.75 THOUSAND/UL (ref 4.31–10.16)

## 2023-11-15 PROCEDURE — 99232 SBSQ HOSP IP/OBS MODERATE 35: CPT | Performed by: INTERNAL MEDICINE

## 2023-11-15 PROCEDURE — 85025 COMPLETE CBC W/AUTO DIFF WBC: CPT | Performed by: INTERNAL MEDICINE

## 2023-11-15 PROCEDURE — 94668 MNPJ CHEST WALL SBSQ: CPT

## 2023-11-15 PROCEDURE — 80048 BASIC METABOLIC PNL TOTAL CA: CPT | Performed by: INTERNAL MEDICINE

## 2023-11-15 PROCEDURE — 92610 EVALUATE SWALLOWING FUNCTION: CPT

## 2023-11-15 PROCEDURE — 94669 MECHANICAL CHEST WALL OSCILL: CPT

## 2023-11-15 PROCEDURE — 82948 REAGENT STRIP/BLOOD GLUCOSE: CPT

## 2023-11-15 PROCEDURE — 84145 PROCALCITONIN (PCT): CPT | Performed by: INTERNAL MEDICINE

## 2023-11-15 PROCEDURE — 94640 AIRWAY INHALATION TREATMENT: CPT

## 2023-11-15 PROCEDURE — 94760 N-INVAS EAR/PLS OXIMETRY 1: CPT

## 2023-11-15 PROCEDURE — 99223 1ST HOSP IP/OBS HIGH 75: CPT | Performed by: INTERNAL MEDICINE

## 2023-11-15 PROCEDURE — 93010 ELECTROCARDIOGRAM REPORT: CPT | Performed by: INTERNAL MEDICINE

## 2023-11-15 RX ORDER — PREDNISONE 20 MG/1
40 TABLET ORAL DAILY
Status: DISCONTINUED | OUTPATIENT
Start: 2023-11-16 | End: 2023-11-16 | Stop reason: HOSPADM

## 2023-11-15 RX ORDER — INSULIN LISPRO 100 [IU]/ML
8 INJECTION, SOLUTION INTRAVENOUS; SUBCUTANEOUS
Status: DISCONTINUED | OUTPATIENT
Start: 2023-11-15 | End: 2023-11-16

## 2023-11-15 RX ADMIN — INSULIN LISPRO 8 UNITS: 100 INJECTION, SOLUTION INTRAVENOUS; SUBCUTANEOUS at 16:30

## 2023-11-15 RX ADMIN — INSULIN LISPRO 2 UNITS: 100 INJECTION, SOLUTION INTRAVENOUS; SUBCUTANEOUS at 09:25

## 2023-11-15 RX ADMIN — IPRATROPIUM BROMIDE 0.5 MG: 0.5 SOLUTION RESPIRATORY (INHALATION) at 13:21

## 2023-11-15 RX ADMIN — INSULIN LISPRO 8 UNITS: 100 INJECTION, SOLUTION INTRAVENOUS; SUBCUTANEOUS at 12:04

## 2023-11-15 RX ADMIN — BENZONATATE 200 MG: 100 CAPSULE ORAL at 16:29

## 2023-11-15 RX ADMIN — INSULIN LISPRO 3 UNITS: 100 INJECTION, SOLUTION INTRAVENOUS; SUBCUTANEOUS at 12:04

## 2023-11-15 RX ADMIN — CLOBETASOL PROPIONATE: 0.5 CREAM TOPICAL at 21:56

## 2023-11-15 RX ADMIN — EMPAGLIFLOZIN 10 MG: 10 TABLET, FILM COATED ORAL at 09:25

## 2023-11-15 RX ADMIN — PANTOPRAZOLE SODIUM 40 MG: 40 TABLET, DELAYED RELEASE ORAL at 05:49

## 2023-11-15 RX ADMIN — GUAIFENESIN 600 MG: 600 TABLET ORAL at 16:29

## 2023-11-15 RX ADMIN — LEVOTHYROXINE SODIUM 100 MCG: 100 TABLET ORAL at 05:49

## 2023-11-15 RX ADMIN — B-COMPLEX W/ C & FOLIC ACID TAB 1 TABLET: TAB at 09:25

## 2023-11-15 RX ADMIN — INSULIN LISPRO 8 UNITS: 100 INJECTION, SOLUTION INTRAVENOUS; SUBCUTANEOUS at 09:25

## 2023-11-15 RX ADMIN — OXYBUTYNIN CHLORIDE 10 MG: 5 TABLET, EXTENDED RELEASE ORAL at 09:25

## 2023-11-15 RX ADMIN — GUAIFENESIN 600 MG: 600 TABLET ORAL at 09:25

## 2023-11-15 RX ADMIN — LEVALBUTEROL HYDROCHLORIDE 1.25 MG: 1.25 SOLUTION RESPIRATORY (INHALATION) at 20:18

## 2023-11-15 RX ADMIN — LOSARTAN POTASSIUM 100 MG: 50 TABLET, FILM COATED ORAL at 09:25

## 2023-11-15 RX ADMIN — FLUTICASONE PROPIONATE 1 SPRAY: 50 SPRAY, METERED NASAL at 09:28

## 2023-11-15 RX ADMIN — INSULIN LISPRO 3 UNITS: 100 INJECTION, SOLUTION INTRAVENOUS; SUBCUTANEOUS at 16:29

## 2023-11-15 RX ADMIN — BENZONATATE 200 MG: 100 CAPSULE ORAL at 09:25

## 2023-11-15 RX ADMIN — INSULIN LISPRO 3 UNITS: 100 INJECTION, SOLUTION INTRAVENOUS; SUBCUTANEOUS at 21:52

## 2023-11-15 RX ADMIN — LEVALBUTEROL HYDROCHLORIDE 1.25 MG: 1.25 SOLUTION RESPIRATORY (INHALATION) at 13:22

## 2023-11-15 RX ADMIN — AZITHROMYCIN DIHYDRATE 500 MG: 250 TABLET ORAL at 16:29

## 2023-11-15 RX ADMIN — INSULIN GLARGINE 15 UNITS: 100 INJECTION, SOLUTION SUBCUTANEOUS at 21:52

## 2023-11-15 RX ADMIN — IPRATROPIUM BROMIDE 0.5 MG: 0.5 SOLUTION RESPIRATORY (INHALATION) at 20:18

## 2023-11-15 RX ADMIN — HEPARIN SODIUM 5000 UNITS: 5000 INJECTION INTRAVENOUS; SUBCUTANEOUS at 05:49

## 2023-11-15 RX ADMIN — IPRATROPIUM BROMIDE 0.5 MG: 0.5 SOLUTION RESPIRATORY (INHALATION) at 07:30

## 2023-11-15 RX ADMIN — HEPARIN SODIUM 5000 UNITS: 5000 INJECTION INTRAVENOUS; SUBCUTANEOUS at 12:04

## 2023-11-15 RX ADMIN — HEPARIN SODIUM 5000 UNITS: 5000 INJECTION INTRAVENOUS; SUBCUTANEOUS at 21:52

## 2023-11-15 RX ADMIN — METHYLPREDNISOLONE SODIUM SUCCINATE 40 MG: 40 INJECTION, POWDER, FOR SOLUTION INTRAMUSCULAR; INTRAVENOUS at 09:24

## 2023-11-15 RX ADMIN — ATORVASTATIN CALCIUM 40 MG: 40 TABLET, FILM COATED ORAL at 09:24

## 2023-11-15 RX ADMIN — BENZONATATE 200 MG: 100 CAPSULE ORAL at 21:52

## 2023-11-15 RX ADMIN — LEVALBUTEROL HYDROCHLORIDE 1.25 MG: 1.25 SOLUTION RESPIRATORY (INHALATION) at 07:30

## 2023-11-15 RX ADMIN — ASPIRIN 81 MG: 81 TABLET ORAL at 09:24

## 2023-11-15 RX ADMIN — LORATADINE 10 MG: 10 TABLET ORAL at 09:25

## 2023-11-15 NOTE — ASSESSMENT & PLAN NOTE
Patient and daughter report that occasionally food "gets stuck"  Appears patient has a hiatal hernia  Started PPI  Speech eval ordered   May contribute to chronic cough

## 2023-11-15 NOTE — ASSESSMENT & PLAN NOTE
Lab Results   Component Value Date    EGFR 21 11/15/2023    EGFR 25 11/14/2023    EGFR 28 07/01/2023    CREATININE 1.96 (H) 11/15/2023    CREATININE 1.71 (H) 11/14/2023    CREATININE 1.56 (H) 07/01/2023   Baseline widely variable but per review appears to be in 1.5-2.0 range as of past year or so  Currently at baseline, ok to continue ARB for now  S/P 1x dose of lasix, hold further   Avoid hypotension/nephrotoxins  Monitor UOP   AM BMP

## 2023-11-15 NOTE — CONSULTS
Consultation - Pulmonary Medicine   Jobie Closs 80 y.o. female MRN: [id number]  Unit/Bed#: S -01 Encounter: [id number]      Assessment/Plan:    1. Acute pulmonary insufficiency likely multifaceted as listed below        -Patient remains on room air-94%, does not wear home O2        -Continue saturations greater than 88%        -Pulmonary toileting: Deep breathing cough, OOB as tolerated, IS Q 1 hr          2. Mild to moderate COPD with acute exacerbation        -Inpatient: Currently on IV Solu-Medrol 40 mg twice daily-transition to prednisone 40 mg decrease by 10 mg every 3 days        -Can resume Home regimen:Trelegy 200-60 2.5-25 mcg 1 puff daily, Xopenex nebulizer every 6 as needed, and Proventil 2 puffs every 6 as needed        -Overall COPD has been very well managed throughout the year        -We will schedule close pulmonary follow-up    3. Suspected tracheobronchitis        -Procalcitonin negative times        -Day # 2/3-azithromycin        -Sputum ordered          -Outpatient follow-up or discharge instruction  -Pulmonary will sign off        History of Present Illness   Physician Requesting Consult: Jj Curiel,   Reason for Consult / Principal Problem: COPD  Hx and PE limited by: Nothing  Chief Complaint: " I am feeling a lot better"  HPI: Jobie Closs is a 80 y.o.  female who presented to [address] [place] [address] with complaints of shortness of breath. Patient has past medical history of PE, hyperlipidemia, postnasal drip, and hypertension. Patient reports that approximately 2 to 3 days ago she began having increasing shortness of breath. Patient reports that she then developed a productive cough and some scattered wheezing. Upon ED admission patient was administered 125 mg IV Solu-Medrol, and nebulizer treatment. Pulmonary was consulted for COPD exacerbation. Today upon examination patient reports she overall feels significantly better since admission.   Patient is still having some increased cough and and shortness of breath with some scattered wheezing throughout the lung fields. No significant overnight events reported. Currently denying any fevers, chills, abscess, headaches, night sweats, pleuritic chest pain, or palpations. From pulmonary standpoint, patient follows with Dr. Yan Gonzalez for her mild COPD. Overall patient's COPD has been very well managed throughout the years. Patient reports an approximate 1 pack/day 20-year smoking history. Patient reports she quit smoking in 1973. Spirometry in 2019 showed showed mild obstruction. Patient is currently maintained on Trelegy 200-60 2.5-25 mcg 1 puff daily, Xopenex nebulizer every 6 as needed, and Proventil 2 puffs every 6 as needed. 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 Patient denies any pets. Patient does report history of postnasal drip in which she is maintained on Flonase. Patient reports some seasonal allergies in which she is maintained on Claritin. Patient denies any dose of [place] or GERD. Currently denying any acute exposures to dust, mold, spices, or silica. Consults    Review of Systems   Constitutional:  Negative for chills and fever. HENT:  Negative for ear pain and sore throat. Eyes:  Negative for pain and visual disturbance. Respiratory:  Positive for cough and shortness of breath. Negative for apnea, choking, chest tightness, wheezing and stridor. Cardiovascular:  Negative for chest pain and palpitations. Gastrointestinal:  Negative for abdominal pain and vomiting. Genitourinary:  Negative for dysuria and hematuria. Musculoskeletal:  Negative for arthralgias and back pain. Skin:  Negative for color change and rash. Neurological:  Negative for seizures and syncope. Psychiatric/Behavioral:  Negative for agitation and behavioral problems. All other systems reviewed and are negative.       Historical Information   Past Medical History: Diagnosis Date    Arthritis     Asthma     COPD (chronic obstructive pulmonary disease) ([address])     Diabetes mellitus ([address])     Difficulty walking     Gout     Hyperlipidemia     Hypertension     Hypothyroidism     Neuropathy in diabetes ([address])     Overactive bladder     PVD (peripheral vascular disease) ([address] [facility])     [place]      Past Surgical History:   Procedure Laterality Date    ARTERIAL BYPASS SURGERY      CAROTID ENDARTERECTOMY Right     CATARACT EXTRACTION      HYSTERECTOMY      NEPHRECTOMY      in her 25s     Social History   Social History     Substance and Sexual Activity   Alcohol Use Not Currently    Alcohol/week: 1.0 standard drink of alcohol    Types: 1 Glasses of wine per week     Social History     Substance and Sexual Activity   Drug Use Never     Social History     Tobacco Use   Smoking Status Former    Packs/day: 1.00    Years: 20.00    Total pack years: 20.00    Types: Cigarettes    Start date: 56    Quit date: 80    Years since quittin.9   Smokeless Tobacco Former     E-Cigarette/Vaping    E-Cigarette Use Never User      E-Cigarette/Vaping Substances    Nicotine No     THC No     CBD No     Flavoring No     Other No     Unknown No      Occupational History: Noncontributory    Family History:   Family History   Problem Relation Age of Onset    Asthma Mother     Asthma Father     Diabetes Father     Arthritis Father     Hypertension Father     Lung cancer Sister     Early death Brother 39    COPD Daughter     Asthma Daughter     Rheum arthritis Daughter     Cataracts Daughter     Liver cancer Son     Alzheimer's disease Sister     Rectal cancer Sister     Thyroid cancer Sister     No Known Problems Sister     Asthma Son        Meds/Allergies   pertinent pulmonary meds have been reviewed    Allergies   Allergen Reactions    Ace Inhibitors      Other reaction(s): cough    Metformin      Other reaction(s): severe constipation    Penicillins Hives    Sulfa Antibiotics      Unknown reaction Objective   Vitals: Blood pressure 153/85, pulse (!) 109, temperature 97.9 °F (36.6 °C), temperature source Oral, resp. rate 19, height 5' 1" (1.549 m), weight 74.3 kg (163 lb 12.8 oz), SpO2 99 %, not currently breastfeeding. ,Body mass index is 30.95 kg/m². No intake or output data in the 24 hours ending 11/15/23 1110  Invasive Devices       Peripheral Intravenous Line  Duration             Peripheral IV 11/15/23 Distal;Right;Ventral (anterior) Forearm <1 day                    Physical Exam  Constitutional:       General: She is not in acute distress. Appearance: Normal appearance. She is normal weight. She is not ill-appearing. HENT:      Head: Normocephalic and atraumatic. Nose: Nose normal. No congestion or rhinorrhea. Mouth/Throat:      Mouth: Mucous membranes are dry. Pharynx: Oropharynx is clear. No oropharyngeal exudate or posterior oropharyngeal erythema. Cardiovascular:      Rate and Rhythm: Normal rate and regular rhythm. Pulses: Normal pulses. Heart sounds: No murmur heard. No friction rub. No gallop. Pulmonary:      Effort: Pulmonary effort is normal. No respiratory distress. Breath sounds: No stridor. Wheezing present. No rhonchi or rales. Comments: Some expiratory scattered wheezing  Chest:      Chest wall: No tenderness. Abdominal:      General: Abdomen is flat. Bowel sounds are normal. There is no distension. Palpations: Abdomen is soft. There is no mass. Musculoskeletal:         General: No swelling or tenderness. Normal range of motion. Cervical back: Normal range of motion. No rigidity or tenderness. Skin:     General: Skin is warm and dry. Coloration: Skin is not jaundiced or pale. Neurological:      General: No focal deficit present. Mental Status: She is alert and oriented to person, place, and time. Mental status is at baseline.    Psychiatric:         Mood and Affect: Mood normal.         Behavior: Behavior normal.         Lab Results: I have personally reviewed pertinent lab results. , BNP:   Lab Results   Component Value Date    BNP 11 11/14/2023   , CBC:   Lab Results   Component Value Date    WBC 8.75 11/15/2023    HGB 14.6 11/15/2023    HCT 43.5 11/15/2023    MCV 86 11/15/2023     11/15/2023    RBC 5.04 11/15/2023    MCH 29.0 11/15/2023    MCHC 33.6 11/15/2023    RDW 15.1 11/15/2023    MPV 10.2 11/15/2023    NRBC 0 11/15/2023   , CMP:   Lab Results   Component Value Date    SODIUM 136 11/15/2023    K 4.5 11/15/2023     11/15/2023    CO2 24 11/15/2023    BUN 39 (H) 11/15/2023    CREATININE 1.96 (H) 11/15/2023    CALCIUM 9.4 11/15/2023    AST 22 11/14/2023    ALT 17 11/14/2023    ALKPHOS 77 11/14/2023    EGFR 21 11/15/2023   , PT/INR: No results found for: "PT", "INR"        Imaging Studies: I have personally reviewed pertinent films in PACS     Chest x-ray-vascular congestion    EKG, Pathology, and Other Studies: I have personally reviewed pertinent films in PACS     EKG-sinus rhythm    Pulmonary Results (PFTs, PSG): I have personally reviewed pertinent films in PACS     2019-mild obstruction    Code Status: Level 1 - Full Code    Portions of the record may have been created with voice recognition software. Occasional wrong word or "sound a like" substitutions may have occurred due to the inherent limitations of voice recognition software. Read the chart carefully and recognize, using context, where substitutions have occurred.

## 2023-11-15 NOTE — ASSESSMENT & PLAN NOTE
Lab Results   Component Value Date    HGBA1C 6.6 (H) 04/12/2023       Recent Labs     11/14/23  2048 11/14/23  2235 11/14/23  2240 11/15/23  0811   POCGLU 475* >500* 479* 291*       Blood Sugar Average: Last 72 hrs:  (P) 330.6 Most recent A1C acceptable  Home regimen includes Trulicity once weekly, Jardiance 10 mg daily, Lantus 10 units at bedtime  Placed on higher dose lantus 15 units HS given steroids + SSI  Add mealtime humalog   Diabetic diet

## 2023-11-15 NOTE — PLAN OF CARE
Problem: Potential for Falls  Goal: Patient will remain free of falls  Description: INTERVENTIONS:  - Educate patient/family on patient safety including physical limitations  - Instruct patient to call for assistance with activity   - Consult OT/PT to assist with strengthening/mobility   - Keep Call bell within reach  - Keep bed low and locked with side rails adjusted as appropriate  - Keep care items and personal belongings within reach  - Initiate and maintain comfort rounds  - Make Fall Risk Sign visible to staff  - Offer Toileting every  Hours, in advance of need  - Initiate/Maintain alarm  - Obtain necessary fall risk management equipment:   -  Apply yellow socks and bracelet for high fall risk patients  - Consider moving patient to room near nurses station  Outcome: Progressing     Problem: RESPIRATORY - ADULT  Goal: Achieves optimal ventilation and oxygenation  Description: INTERVENTIONS:  - Assess for changes in respiratory status  - Assess for changes in mentation and behavior  - Position to facilitate oxygenation and minimize respiratory effort  - Oxygen administered by appropriate delivery if ordered  - Initiate smoking cessation education as indicated  - Encourage broncho-pulmonary hygiene including cough, deep breathe, Incentive Spirometry  - Assess the need for suctioning and aspirate as needed  - Assess and instruct to report SOB or any respiratory difficulty  - Respiratory Therapy support as indicated  Outcome: Progressing     Problem: INFECTION - ADULT  Goal: Absence or prevention of progression during hospitalization  Description: INTERVENTIONS:  - Assess and monitor for signs and symptoms of infection  - Monitor lab/diagnostic results  - Monitor all insertion sites, i.e. indwelling lines, tubes, and drains  - Monitor endotracheal if appropriate and nasal secretions for changes in amount and color  - [place] appropriate cooling/warming therapies per order  - Administer medications as ordered  - Instruct and encourage patient and family to use good hand hygiene technique  - Identify and instruct in appropriate isolation precautions for identified infection/condition  Outcome: Progressing  Goal: Absence of fever/infection during neutropenic period  Description: INTERVENTIONS:  - Monitor WBC    Outcome: Progressing

## 2023-11-15 NOTE — ASSESSMENT & PLAN NOTE
Noted over past several months, attributed to chronic cough/dysphagia/"indigestion". Patient denies any history of CAD/CHF.    Troponins negative, EKG nonischemic  Continue home asa/statin  Discussed with patient and daughter that we could consider echocardiogram, however given advanced age will defer at this time per their preference

## 2023-11-15 NOTE — PROGRESS NOTES
[address] [facility]  Progress Note  Name: Aryan Price  MRN: [id number]  Unit/Bed#: S -01 I Date of Admission: 11/14/2023   Date of Service: 11/15/2023 I Hospital Day: 1    Assessment/Plan   * COPD (chronic obstructive pulmonary disease) with chronic bronchitis  Assessment & Plan  Presented with worsening productive cough x few weeks. Does have chronic cough as documented below. Suspect secondary to COPD exacerbation. Took azithromycin approx 2 weeks ago without improvement. Has been using home Trelegy and albuterol nebs without improvement. Endorses SOB with exertion. CXR negative for focal infiltrate, COVID/Flu/RSV negative, No fevers or leukocytosis  Procal and BNP normal  Started on IV steroids, wean to PO prednisone as able  S/P 1x dose of IV lasix given questionable congestion on CXR  Continue Duonebs  Azithromycin  Continue Trelegy  Supportive care with tessalon TID  Appreciate pulmonology input     Chest pain  Assessment & Plan  Noted over past several months, attributed to chronic cough/dysphagia/"indigestion". Patient denies any history of CAD/CHF. Troponins negative, EKG nonischemic  Continue home asa/statin  Discussed with patient and daughter that we could consider echocardiogram, however given advanced age will defer at this time per their preference    Chronic cough  Assessment & Plan  Chronic in nature, documented in pulmonology notes since at least May 2022. Has had speech eval per notes. Patient and daughter report worsening cough x3 weeks, productive of yellow sputum  Continue IV steroids, nebs.  Added tessalon/mucinex  Speech eval pending given associated dysphagia--started PPI  Azithro as above  Sputum cx ordered but not able to be sent   Continue flonase/Claritin     Dysphagia  Assessment & Plan  Patient and daughter report that occasionally food "gets stuck"  Appears patient has a hiatal hernia  Started PPI  Speech eval ordered   May contribute to chronic cough     CKD (chronic kidney disease)  Assessment & Plan  Lab Results   Component Value Date    EGFR 21 11/15/2023    EGFR 25 11/14/2023    EGFR 28 07/01/2023    CREATININE 1.96 (H) 11/15/2023    CREATININE 1.71 (H) 11/14/2023    CREATININE 1.56 (H) 07/01/2023   Baseline widely variable but per review appears to be in 1.5-2.0 range as of past year or so  Currently at baseline, ok to continue ARB for now  S/P 1x dose of lasix, hold further   Avoid hypotension/nephrotoxins  Monitor UOP   AM BMP    Hypothyroidism  Assessment & Plan  Continue synthroid    Essential hypertension  Assessment & Plan  BP elevated upon admission as she did not take AM medications   Home meds include Valsartan 320 mg daily, subbed losartan here      DM2 (diabetes mellitus, type 2) [facility])  Assessment & Plan  Lab Results   Component Value Date    HGBA1C 6.6 (H) 04/12/2023       Recent Labs     11/14/23  2048 11/14/23  2235 11/14/23  2240 11/15/23  0811   POCGLU 475* >500* 479* 291*       Blood Sugar Average: Last 72 hrs:  (P) 330.6 Most recent A1C acceptable  Home regimen includes Trulicity once weekly, Jardiance 10 mg daily, Lantus 10 units at bedtime  Placed on higher dose lantus 15 units HS given steroids + SSI  Add mealtime humalog   Diabetic diet             VTE Pharmacologic Prophylaxis: VTE Score: 5 Moderate Risk (Score 3-4) - Pharmacological DVT Prophylaxis Ordered: heparin. Patient Centered Rounds: I performed bedside rounds with nursing staff today. Discussions with Specialists or Other Care Team Provider: appreciate pulm, d/w TRAVIS    Education and Discussions with Family / Patient: Updated  (daughter) at bedside. Total Time Spent on Date of Encounter in care of patient: 33 mins.  This time was spent on one or more of the following: performing physical exam; counseling and coordination of care; obtaining or reviewing history; documenting in the medical record; reviewing/ordering tests, medications or procedures; communicating with other healthcare professionals and discussing with patient's family/caregivers. Current Length of Stay: 1 day(s)  Current Patient Status: Inpatient   Certification Statement: The patient will continue to require additional inpatient hospital stay due to IV steroids, speech eval etc   Discharge Plan: Anticipate discharge in 24-48 hrs to home. Code Status: Level 1 - Full Code    Subjective:   Pt feeling better today. She and daughter say she's still coughing but improved overall. No oxygen requirements. Objective:     Vitals:   Temp (24hrs), Av.2 °F (36.8 °C), Min:97.9 °F (36.6 °C), Max:98.4 °F (36.9 °C)    Temp:  [97.9 °F (36.6 °C)-98.4 °F (36.9 °C)] 97.9 °F (36.6 °C)  HR:  [] 109  Resp:  [17-20] 19  BP: (140-178)/() 153/85  SpO2:  [94 %-100 %] 99 %  Body mass index is 30.95 kg/m². Input and Output Summary (last 24 hours):   No intake or output data in the 24 hours ending 11/15/23 0937    Physical Exam:   Physical Exam  Vitals and nursing note reviewed. Constitutional:       Comments: On RA, no distress    Cardiovascular:      Rate and Rhythm: Normal rate and regular rhythm. Pulmonary:      Breath sounds: Wheezing (exp) present. Abdominal:      General: There is no distension. Tenderness: There is no abdominal tenderness. Musculoskeletal:      Right lower leg: No edema. Left lower leg: No edema. Neurological:      Mental Status: She is oriented to person, place, and time.    Psychiatric:         Mood and Affect: Mood normal.          Additional Data:     Labs:  Results from last 7 days   Lab Units 11/15/23  0528   WBC Thousand/uL 8.75   HEMOGLOBIN g/dL 14.6   HEMATOCRIT % 43.5   PLATELETS Thousands/uL 196   NEUTROS PCT % 85*   LYMPHS PCT % 12*   MONOS PCT % 2*   EOS PCT % 0     Results from last 7 days   Lab Units 11/15/23  0528 23  1226   SODIUM mmol/L 136 138   POTASSIUM mmol/L 4.5 4.7   CHLORIDE mmol/L 102 107   CO2 mmol/L 24 27   BUN mg/dL 39* 25   CREATININE mg/dL 1.96* 1.71*   ANION GAP mmol/L 10 4   CALCIUM mg/dL 9.4 9.5   ALBUMIN g/dL  --  3.8   TOTAL BILIRUBIN mg/dL  --  0.49   ALK PHOS U/L  --  77   ALT U/L  --  17   AST U/L  --  22   GLUCOSE RANDOM mg/dL 305* 127         Results from last 7 days   Lab Units 11/15/23  0811 11/14/23  2240 11/14/23  2235 11/14/23  2048 11/14/23  1717 11/14/23  1553   POC GLUCOSE mg/dl 291* 479* >500* 475* 223* 185*         Results from last 7 days   Lab Units 11/15/23  0528 11/14/23  1226   PROCALCITONIN ng/ml 0.17 0.13       Lines/Drains:  Invasive Devices       Peripheral Intravenous Line  Duration             Peripheral IV 11/15/23 Distal;Right;Ventral (anterior) Forearm <1 day                          Imaging: No pertinent imaging reviewed.     Recent Cultures (last 7 days):         Last 24 Hours Medication List:   Current Facility-Administered Medications   Medication Dose Route Frequency Provider Last Rate    acetaminophen  650 mg Oral Q6H PRN Brittaney Oneal PA-C      albuterol  2 puff Inhalation Q4H PRN Stacia Cardenas MD      aspirin  81 mg Oral Daily Brittaney Oneal PA-C      atorvastatin  40 mg Oral Daily Brittaney Oneal PA-C      azithromycin  500 mg Oral Q24H YUAN Matt-ROXANNE      benzonatate  200 mg Oral TID Brittaney Oneal PA-C      clobetasol   Topical HS Brittaney Oneal PA-C      Empagliflozin  10 mg Oral Daily Brittaney Oneal PA-C      fluticasone  1 spray Nasal Daily YAUN Matt-ROXANNE      fluticasone-vilanterol  1 puff Inhalation Daily Brittaney Oneal PA-C      guaiFENesin  600 mg Oral BID Brittaney Oneal PA-C      heparin (porcine)  5,000 Units Subcutaneous Q8H [address] Brittaney Oneal PA-C      insulin glargine  15 Units Subcutaneous HS Brittaney Oneal PA-C      insulin lispro  1-5 Units Subcutaneous TID AC Samantha Lopez PA-C      insulin lispro  1-5 Units Subcutaneous HS Brittaney Oneal PA-C      insulin lispro  8 Units Subcutaneous TID With Meals Brittaney Oneal PA-C      ipratropium  0.5 mg Nebulization TID Abby Cerna MD      levalbuterol  1.25 mg Nebulization TID Abby Cerna MD      levothyroxine  100 mcg Oral Early Morning Jethro Aguilera PA-C      loratadine  10 mg Oral Daily Jethro Aguilera PA-C      losartan  100 mg Oral Daily Jethro Aguilera PA-C      methylPREDNISolone sodium succinate  40 mg Intravenous Q12H [address] Jethro Aguilera PA-C      multivitamin stress formula  1 tablet Oral Daily Jethro Aguilera PA-C      oxybutynin  10 mg Oral Daily Jethro Aguilera PA-C      pantoprazole  40 mg Oral Early Morning Jethro Aguilera PA-C          Today, Patient Was Seen By: Jethro Aguilera PA-C    **Please Note: This note may have been constructed using a voice recognition system. **

## 2023-11-15 NOTE — ASSESSMENT & PLAN NOTE
Chronic in nature, documented in pulmonology notes since at least May 2022. Has had speech eval per notes. Patient and daughter report worsening cough x3 weeks, productive of yellow sputum  Continue IV steroids, nebs.  Added tessalon/mucinex  Speech eval pending given associated dysphagia--started PPI  Azithro as above  Sputum cx ordered but not able to be sent   Continue flonase/Claritin

## 2023-11-15 NOTE — ASSESSMENT & PLAN NOTE
Presented with worsening productive cough x few weeks. Does have chronic cough as documented below. Suspect secondary to COPD exacerbation. Took azithromycin approx 2 weeks ago without improvement. Has been using home Trelegy and albuterol nebs without improvement. Endorses SOB with exertion.    CXR negative for focal infiltrate, COVID/Flu/RSV negative, No fevers or leukocytosis  Procal and BNP normal  Started on IV steroids, wean to PO prednisone as able  S/P 1x dose of IV lasix given questionable congestion on CXR  Continue Duonebs  Azithromycin  Continue Trelegy  Supportive care with tessalon TID  Appreciate pulmonology input

## 2023-11-15 NOTE — SPEECH THERAPY NOTE
Speech-Language Pathology Bedside Swallow Evaluation        Patient Name: Mati Meyers    Today's Date: 11/15/2023     Problem List  Principal Problem:    COPD (chronic obstructive pulmonary disease) with chronic bronchitis  Active Problems:    DM2 (diabetes mellitus, type 2) ([address])    Essential hypertension    Hypothyroidism    Chronic cough    CKD (chronic kidney disease)    Chest pain    Dysphagia         Summary    Oral and pharyngeal stages of swallowing appeared WNL, no overt s/s aspiration and no c/o food dysphagia at this time. Pt stated she hasn't had any trouble swallowing recently, symptoms of GERD "years ago". Recommendations:   Diet: regular diet and thin liquids   Meds: whole with liquid   Frequent Oral care: 2x/day  Aspiration precautions   Other Recommendations/ considerations: no follow up tx needed. Current Medical Status  Pt is a 80 y.o. female who presented to [address] [place] [address]  with COPD. Pt presents with worsening cough. The patient's daughter at bedside reports she has a chronic cough at baseline which is nonproductive. Over the last 2 to 3 weeks however, cough has become productive and intractable. She also reports associated shortness of breath. No fever or chills. Past medical history:   Please see H&P for details    Special Studies:  CXR: 11/14/23 Slight vascular congestion.      Social/Education/Vocational Hx:  Pt lives with family    Swallow Information   Current Risks for Dysphagia & Aspiration:  COPD  Current Symptoms/Concerns:  chronic cough, SOB; food occas gets stuck  Current Diet: regular diet and thin liquids   Baseline Diet: regular diet and thin liquids  Takes pills- whole w/ water     Baseline Assessment   Behavior/Cognition: alert  Speech/Language Status: able to participate in conversation and able to follow commands  Patient Positioning: upright in chair     Swallow Mechanism Exam   Facial: symmetrical  Labial: WFL  Lingual: WFL  Velum: unable to visualize  Mandible: adequate ROM  Dentition: edentulous and limited dentition-wears upper denture and lower partial, but does not need them to eat, she just took them out. Vocal quality:clear/adequate   Volitional Cough: strong/productive   Respiratory: RA    Consistencies Assessed and Performance   Consistencies Administered: thin liquids, nectar thick, puree, and soft solids (toast)    Oral Stage: pt able to bite toast w/o dentures in place; she drank NTL and thin liquids by cup and straw w/ good oral control. Pharyngeal Stage: swallow were timely and complete w/ no coughing, throat clearing or wet voice noted.        Esophageal Concerns: none reported      Results Reviewed with: patient and RN   Dysphagia Goals: none at this time      April Emelyn [facility]  Speech Pathologist  [place] license # [id number]  [place] license # [id number]  Available via [employer]

## 2023-11-16 VITALS
DIASTOLIC BLOOD PRESSURE: 73 MMHG | HEIGHT: 61 IN | HEART RATE: 90 BPM | WEIGHT: 163.8 LBS | RESPIRATION RATE: 19 BRPM | BODY MASS INDEX: 30.93 KG/M2 | SYSTOLIC BLOOD PRESSURE: 135 MMHG | TEMPERATURE: 97.5 F | OXYGEN SATURATION: 97 %

## 2023-11-16 PROBLEM — R13.10 DYSPHAGIA: Status: RESOLVED | Noted: 2023-11-14 | Resolved: 2023-11-16

## 2023-11-16 LAB
ANION GAP SERPL CALCULATED.3IONS-SCNC: 7 MMOL/L
BUN SERPL-MCNC: 49 MG/DL (ref 5–25)
CALCIUM SERPL-MCNC: 8.9 MG/DL (ref 8.4–10.2)
CHLORIDE SERPL-SCNC: 108 MMOL/L (ref 96–108)
CO2 SERPL-SCNC: 24 MMOL/L (ref 21–32)
CREAT SERPL-MCNC: 2.11 MG/DL (ref 0.6–1.3)
GFR SERPL CREATININE-BSD FRML MDRD: 19 ML/MIN/1.73SQ M
GLUCOSE SERPL-MCNC: 134 MG/DL (ref 65–140)
GLUCOSE SERPL-MCNC: 207 MG/DL (ref 65–140)
GLUCOSE SERPL-MCNC: 99 MG/DL (ref 65–140)
POTASSIUM SERPL-SCNC: 3.9 MMOL/L (ref 3.5–5.3)
SODIUM SERPL-SCNC: 139 MMOL/L (ref 135–147)

## 2023-11-16 PROCEDURE — 82948 REAGENT STRIP/BLOOD GLUCOSE: CPT

## 2023-11-16 PROCEDURE — 99239 HOSP IP/OBS DSCHRG MGMT >30: CPT | Performed by: INTERNAL MEDICINE

## 2023-11-16 PROCEDURE — 94760 N-INVAS EAR/PLS OXIMETRY 1: CPT

## 2023-11-16 PROCEDURE — 80048 BASIC METABOLIC PNL TOTAL CA: CPT | Performed by: INTERNAL MEDICINE

## 2023-11-16 PROCEDURE — 94640 AIRWAY INHALATION TREATMENT: CPT

## 2023-11-16 RX ORDER — LEVALBUTEROL INHALATION SOLUTION 0.63 MG/3ML
0.63 SOLUTION RESPIRATORY (INHALATION) 2 TIMES DAILY
Start: 2023-11-16

## 2023-11-16 RX ORDER — INSULIN LISPRO 100 [IU]/ML
5 INJECTION, SOLUTION INTRAVENOUS; SUBCUTANEOUS
Status: DISCONTINUED | OUTPATIENT
Start: 2023-11-16 | End: 2023-11-16 | Stop reason: HOSPADM

## 2023-11-16 RX ORDER — PREDNISONE 20 MG/1
TABLET ORAL
Qty: 15 TABLET | Refills: 0 | Status: SHIPPED | OUTPATIENT
Start: 2023-11-17 | End: 2023-11-29

## 2023-11-16 RX ORDER — GUAIFENESIN 600 MG/1
600 TABLET, EXTENDED RELEASE ORAL 2 TIMES DAILY
Refills: 0
Start: 2023-11-16

## 2023-11-16 RX ORDER — PANTOPRAZOLE SODIUM 40 MG/1
40 TABLET, DELAYED RELEASE ORAL
Qty: 30 TABLET | Refills: 0 | Status: SHIPPED | OUTPATIENT
Start: 2023-11-17

## 2023-11-16 RX ORDER — BENZONATATE 200 MG/1
200 CAPSULE ORAL 3 TIMES DAILY
Qty: 90 CAPSULE | Refills: 0 | Status: SHIPPED | OUTPATIENT
Start: 2023-11-16

## 2023-11-16 RX ADMIN — B-COMPLEX W/ C & FOLIC ACID TAB 1 TABLET: TAB at 08:16

## 2023-11-16 RX ADMIN — INSULIN LISPRO 5 UNITS: 100 INJECTION, SOLUTION INTRAVENOUS; SUBCUTANEOUS at 12:14

## 2023-11-16 RX ADMIN — INSULIN LISPRO 5 UNITS: 100 INJECTION, SOLUTION INTRAVENOUS; SUBCUTANEOUS at 08:13

## 2023-11-16 RX ADMIN — EMPAGLIFLOZIN 10 MG: 10 TABLET, FILM COATED ORAL at 08:18

## 2023-11-16 RX ADMIN — LORATADINE 10 MG: 10 TABLET ORAL at 08:16

## 2023-11-16 RX ADMIN — SODIUM CHLORIDE 250 ML: 0.9 INJECTION, SOLUTION INTRAVENOUS at 09:10

## 2023-11-16 RX ADMIN — BENZONATATE 200 MG: 100 CAPSULE ORAL at 08:17

## 2023-11-16 RX ADMIN — LOSARTAN POTASSIUM 100 MG: 50 TABLET, FILM COATED ORAL at 08:16

## 2023-11-16 RX ADMIN — FLUTICASONE PROPIONATE 1 SPRAY: 50 SPRAY, METERED NASAL at 08:19

## 2023-11-16 RX ADMIN — INSULIN LISPRO 1 UNITS: 100 INJECTION, SOLUTION INTRAVENOUS; SUBCUTANEOUS at 12:14

## 2023-11-16 RX ADMIN — PANTOPRAZOLE SODIUM 40 MG: 40 TABLET, DELAYED RELEASE ORAL at 05:18

## 2023-11-16 RX ADMIN — FLUTICASONE FUROATE AND VILANTEROL TRIFENATATE 1 PUFF: 200; 25 POWDER RESPIRATORY (INHALATION) at 08:19

## 2023-11-16 RX ADMIN — GUAIFENESIN 600 MG: 600 TABLET ORAL at 08:18

## 2023-11-16 RX ADMIN — ASPIRIN 81 MG: 81 TABLET ORAL at 08:17

## 2023-11-16 RX ADMIN — ATORVASTATIN CALCIUM 40 MG: 40 TABLET, FILM COATED ORAL at 08:17

## 2023-11-16 RX ADMIN — HEPARIN SODIUM 5000 UNITS: 5000 INJECTION INTRAVENOUS; SUBCUTANEOUS at 05:18

## 2023-11-16 RX ADMIN — PREDNISONE 40 MG: 20 TABLET ORAL at 08:15

## 2023-11-16 RX ADMIN — LEVALBUTEROL HYDROCHLORIDE 1.25 MG: 1.25 SOLUTION RESPIRATORY (INHALATION) at 07:38

## 2023-11-16 RX ADMIN — LEVOTHYROXINE SODIUM 100 MCG: 100 TABLET ORAL at 05:18

## 2023-11-16 RX ADMIN — IPRATROPIUM BROMIDE 0.5 MG: 0.5 SOLUTION RESPIRATORY (INHALATION) at 07:38

## 2023-11-16 RX ADMIN — OXYBUTYNIN CHLORIDE 10 MG: 5 TABLET, EXTENDED RELEASE ORAL at 08:15

## 2023-11-16 NOTE — ASSESSMENT & PLAN NOTE
Lab Results   Component Value Date    HGBA1C 6.6 (H) 04/12/2023       Recent Labs     11/15/23  1117 11/15/23  1507 11/15/23  2111 11/16/23  0759   POCGLU 368* 359* 320* 99         Blood Sugar Average: Last 72 hrs:  (P) 311 Most recent A1C acceptable  Home regimen includes Trulicity once weekly, Jardiance 10 mg daily, Lantus 10 units at bedtime  Placed on higher dose lantus 15 units HS given steroids, continue higher dose at discharge  Monitor glucose closely at discharge as needs may decrease with wean, family aware

## 2023-11-16 NOTE — ASSESSMENT & PLAN NOTE
Lab Results   Component Value Date    EGFR 19 11/16/2023    EGFR 21 11/15/2023    EGFR 25 11/14/2023    CREATININE 2.11 (H) 11/16/2023    CREATININE 1.96 (H) 11/15/2023    CREATININE 1.71 (H) 11/14/2023   Baseline widely variable but per review appears to be in 1.5-2.0 range as of past year or so  Currently at baseline, ok to continue ARB for now  S/P 1x dose of lasix, hold further   Give small ivf bolus on 11/16 prior to discharge given bump in creat though still within range

## 2023-11-16 NOTE — ASSESSMENT & PLAN NOTE
Patient and daughter report that occasionally food "gets stuck"  Appears patient has a hiatal hernia  Started PPI  Speech eval ordered, normal   May contribute to chronic cough

## 2023-11-16 NOTE — DISCHARGE SUMMARY
[facility]  Discharge- Christiano Mao 9/26/1928, 80 y.o. female MRN: [id number]  Unit/Bed#: S -01 Encounter: [id number]  Primary Care Provider: Rena Mcgovern DO   Date and time admitted to hospital: 11/14/2023 11:09 AM    * COPD (chronic obstructive pulmonary disease) with chronic bronchitis  Assessment & Plan  Presented with worsening productive cough x few weeks. Does have chronic cough as documented below. Suspect secondary to COPD exacerbation. Took azithromycin approx 2 weeks ago without improvement. Has been using home Trelegy and albuterol nebs without improvement. Endorses SOB with exertion. CXR negative for focal infiltrate, COVID/Flu/RSV negative, No fevers or leukocytosis  Procal and BNP normal  Started on IV steroids, weaned to PO prednisone which we will taper on discharge   S/P 1x dose of IV lasix given questionable congestion on CXR  Continue Duonebs  Azithromycin x3 days  Continue Trelegy  Supportive care with tessalon TID  Pulmonology signed off    Chest pain  Assessment & Plan  Noted over past several months, attributed to chronic cough/dysphagia/"indigestion". Patient denies any history of CAD/CHF. Troponins negative, EKG nonischemic  Continue home asa/statin  Discussed with patient and daughter that we could consider echocardiogram, however given advanced age will defer at this time per their preference    Chronic cough  Assessment & Plan  Chronic in nature, documented in pulmonology notes since at least May 2022. Has had speech eval per notes. Patient and daughter report worsening cough x3 weeks, productive of yellow sputum  Continue steroids, nebs.  Added tessalon/mucinex  Speech eval obtained given associated dysphagia--started PPI, was normal  Azithro as above  Sputum cx ordered but not able to be sent   Continue flonase/Claritin     CKD (chronic kidney disease)  Assessment & Plan  Lab Results   Component Value Date    EGFR 19 11/16/2023    EGFR 21 11/15/2023    EGFR 25 11/14/2023    CREATININE 2.11 (H) 11/16/2023    CREATININE 1.96 (H) 11/15/2023    CREATININE 1.71 (H) 11/14/2023   Baseline widely variable but per review appears to be in 1.5-2.0 range as of past year or so  Currently at baseline, ok to continue ARB for now  S/P 1x dose of lasix, hold further   Give small ivf bolus on 11/16 prior to discharge given bump in creat though still within range     Hypothyroidism  Assessment & Plan  Continue synthroid    Essential hypertension  Assessment & Plan  BP elevated upon admission as she did not take AM medications   Home meds include Valsartan 320 mg daily, subbed losartan here      DM2 (diabetes mellitus, type 2) [facility])  Assessment & Plan  Lab Results   Component Value Date    HGBA1C 6.6 (H) 04/12/2023       Recent Labs     11/15/23  1117 11/15/23  1507 11/15/23  2111 11/16/23  0759   POCGLU 368* 359* 320* 99         Blood Sugar Average: Last 72 hrs:  (P) 311 Most recent A1C acceptable  Home regimen includes Trulicity once weekly, Jardiance 10 mg daily, Lantus 10 units at bedtime  Placed on higher dose lantus 15 units HS given steroids, continue higher dose at discharge  Monitor glucose closely at discharge as needs may decrease with wean, family aware       Dysphagia-resolved as of 11/16/2023  Assessment & Plan  Patient and daughter report that occasionally food "gets stuck"  Appears patient has a hiatal hernia  Started PPI  Speech eval ordered, normal   May contribute to chronic cough       Medical Problems       Resolved Problems  Date Reviewed: 11/16/2023            Resolved    Dysphagia 11/16/2023     Resolved by  Rob Moore PA-C        Discharging Physician / Practitioner: Rob Moore PA-C  PCP: Thierry Vega DO  Admission Date:   Admission Orders (From admission, onward)       Ordered        11/14/23 1419  INPATIENT ADMISSION  Once                          Discharge Date: 11/16/23    Consultations During [facility] Stay:  Pulmonology  Speech    Procedures Performed:   CXR: slight vascular congestion  COVID/Flu/RSV: negative   ProcaL negative  Troponins negative    Significant Findings / Test Results:   See above    Incidental Findings:   none    Test Results Pending at Discharge (will require follow up):   none     Outpatient Tests Requested:  F/u PCP    Complications:  none    Reason for Admission: Cough, SOB    Hospital Course:   Mati Meyers is a 80 y.o. female patient who originally presented to the hospital on 11/14/2023 due to acute on chronic cough as well as SOB. Was treated for a COPD exacerbation with IV steroids. Pulmonology was consulted. Eventually cleared for discharge with PO prednisone taper. Please see above list of diagnoses and related plan for additional information. Condition at Discharge: stable    Discharge Day Visit / Exam:   Subjective:  feels well. Cough still noted but improved overall. Not hypoxic, not requiring oxygen. Vitals: Blood Pressure: 135/73 (11/16/23 0757)  Pulse: 90 (11/16/23 0757)  Temperature: 97.5 °F (36.4 °C) (11/16/23 0757)  Temp Source: Oral (11/16/23 0757)  Respirations: 19 (11/15/23 2112)  Height: 5' 1" (154.9 cm) (11/14/23 1445)  Weight - Scale: 74.3 kg (163 lb 12.8 oz) (11/14/23 1117)  SpO2: 97 % (11/16/23 0757)  Exam:   Physical Exam  Vitals and nursing note reviewed. Constitutional:       General: She is not in acute distress. Comments: On RA, appears younger than stated age    Cardiovascular:      Rate and Rhythm: Normal rate and regular rhythm. Pulmonary:      Effort: No respiratory distress. Comments: Mild exp wheeze  Abdominal:      General: There is no distension. Tenderness: There is no abdominal tenderness. Musculoskeletal:      Right lower leg: No edema. Left lower leg: No edema. Neurological:      Mental Status: She is oriented to person, place, and time.    Psychiatric:         Mood and Affect: Mood normal.          Discussion with Family: Updated  (daughter) at bedside. Discharge instructions/Information to patient and family:   See after visit summary for information provided to patient and family. Provisions for Follow-Up Care:  See after visit summary for information related to follow-up care and any pertinent home health orders. Mobility at time of Discharge:   [address]: 1: Laying in bed       Disposition:   Home    Planned Readmission: no     Discharge Statement:  I spent 34 minutes discharging the patient. This time was spent on the day of discharge. I had direct contact with the patient on the day of discharge. Greater than 50% of the total time was spent examining patient, answering all patient questions, arranging and discussing plan of care with patient as well as directly providing post-discharge instructions. Additional time then spent on discharge activities. Discharge Medications:  See after visit summary for reconciled discharge medications provided to patient and/or family.       **Please Note: This note may have been constructed using a voice recognition system**

## 2023-11-16 NOTE — ASSESSMENT & PLAN NOTE
Chronic in nature, documented in pulmonology notes since at least May 2022. Has had speech eval per notes. Patient and daughter report worsening cough x3 weeks, productive of yellow sputum  Continue steroids, nebs.  Added tessalon/mucinex  Speech eval obtained given associated dysphagia--started PPI, was normal  Azithro as above  Sputum cx ordered but not able to be sent   Continue flonase/Claritin

## 2023-11-16 NOTE — PLAN OF CARE
Problem: Potential for Falls  Goal: Patient will remain free of falls  Description: INTERVENTIONS:  - Educate patient/family on patient safety including physical limitations  - Instruct patient to call for assistance with activity   - Consult OT/PT to assist with strengthening/mobility   - Keep Call bell within reach  - Keep bed low and locked with side rails adjusted as appropriate  - Keep care items and personal belongings within reach  - Initiate and maintain comfort rounds  - Make Fall Risk Sign visible to staff  - Offer Toileting every 2 Hours, in advance of need  - Initiate/Maintain bed alarm  - Obtain necessary fall risk management equipment  - Apply yellow socks and bracelet for high fall risk patients  - Consider moving patient to room near nurses station  Outcome: Adequate for Discharge     Problem: RESPIRATORY - ADULT  Goal: Achieves optimal ventilation and oxygenation  Description: INTERVENTIONS:  - Assess for changes in respiratory status  - Assess for changes in mentation and behavior  - Position to facilitate oxygenation and minimize respiratory effort  - Oxygen administered by appropriate delivery if ordered  - Initiate smoking cessation education as indicated  - Encourage broncho-pulmonary hygiene including cough, deep breathe, Incentive Spirometry  - Assess the need for suctioning and aspirate as needed  - Assess and instruct to report SOB or any respiratory difficulty  - Respiratory Therapy support as indicated  Outcome: Adequate for Discharge     Problem: INFECTION - ADULT  Goal: Absence or prevention of progression during hospitalization  Description: INTERVENTIONS:  - Assess and monitor for signs and symptoms of infection  - Monitor lab/diagnostic results  - Monitor all insertion sites, i.e. indwelling lines, tubes, and drains  - Monitor endotracheal if appropriate and nasal secretions for changes in amount and color  - Tynan appropriate cooling/warming therapies per order  - Administer medications as ordered  - Instruct and encourage patient and family to use good hand hygiene technique  - Identify and instruct in appropriate isolation precautions for identified infection/condition  Outcome: Adequate for Discharge  Goal: Absence of fever/infection during neutropenic period  Description: INTERVENTIONS:  - Monitor WBC    Outcome: Adequate for Discharge

## 2023-11-16 NOTE — ASSESSMENT & PLAN NOTE
Presented with worsening productive cough x few weeks. Does have chronic cough as documented below. Suspect secondary to COPD exacerbation. Took azithromycin approx 2 weeks ago without improvement. Has been using home Trelegy and albuterol nebs without improvement. Endorses SOB with exertion.    CXR negative for focal infiltrate, COVID/Flu/RSV negative, No fevers or leukocytosis  Procal and BNP normal  Started on IV steroids, weaned to PO prednisone which we will taper on discharge   S/P 1x dose of IV lasix given questionable congestion on CXR  Continue Duonebs  Azithromycin x3 days  Continue Trelegy  Supportive care with tessalon TIRIMMA  Pulmonology signed off

## 2023-11-19 ENCOUNTER — HOSPITAL ENCOUNTER (EMERGENCY)
Facility: HOSPITAL | Age: 88
Discharge: HOME | End: 2023-11-20
Attending: EMERGENCY MEDICINE | Admitting: EMERGENCY MEDICINE
Payer: MEDICARE

## 2023-11-19 ENCOUNTER — APPOINTMENT (EMERGENCY)
Dept: RADIOLOGY | Facility: HOSPITAL | Age: 88
End: 2023-11-19
Payer: MEDICARE

## 2023-11-19 ENCOUNTER — NURSE TRIAGE (OUTPATIENT)
Dept: OTHER | Facility: OTHER | Age: 88
End: 2023-11-19

## 2023-11-19 DIAGNOSIS — R73.9 HYPERGLYCEMIA: Primary | ICD-10-CM

## 2023-11-19 DIAGNOSIS — R06.02 SOB (SHORTNESS OF BREATH): ICD-10-CM

## 2023-11-19 DIAGNOSIS — E11.9 DIABETES MELLITUS, TYPE 2 (HCC): ICD-10-CM

## 2023-11-19 LAB
ALBUMIN SERPL BCP-MCNC: 4.2 G/DL (ref 3.5–5)
ALP SERPL-CCNC: 88 U/L (ref 34–104)
ALT SERPL W P-5'-P-CCNC: 22 U/L (ref 7–52)
ANION GAP SERPL CALCULATED.3IONS-SCNC: 10 MMOL/L
AST SERPL W P-5'-P-CCNC: 15 U/L (ref 13–39)
BACTERIA UR QL AUTO: NORMAL /HPF
BASE EX.OXY STD BLDV CALC-SCNC: 77.7 % (ref 60–80)
BASE EXCESS BLDV CALC-SCNC: -1.6 MMOL/L
BASOPHILS # BLD AUTO: 0.03 THOUSANDS/ÂΜL (ref 0–0.1)
BASOPHILS NFR BLD AUTO: 0 % (ref 0–1)
BETA-HYDROXYBUTYRATE: 0.1 MMOL/L
BILIRUB SERPL-MCNC: 0.49 MG/DL (ref 0.2–1)
BILIRUB UR QL STRIP: NEGATIVE
BUN SERPL-MCNC: 44 MG/DL (ref 5–25)
CALCIUM SERPL-MCNC: 9.4 MG/DL (ref 8.4–10.2)
CHLORIDE SERPL-SCNC: 99 MMOL/L (ref 96–108)
CLARITY UR: CLEAR
CO2 SERPL-SCNC: 24 MMOL/L (ref 21–32)
COLOR UR: COLORLESS
CREAT SERPL-MCNC: 1.88 MG/DL (ref 0.6–1.3)
EOSINOPHIL # BLD AUTO: 0 THOUSAND/ÂΜL (ref 0–0.61)
EOSINOPHIL NFR BLD AUTO: 0 % (ref 0–6)
ERYTHROCYTE [DISTWIDTH] IN BLOOD BY AUTOMATED COUNT: 15.7 % (ref 11.6–15.1)
GFR SERPL CREATININE-BSD FRML MDRD: 22 ML/MIN/1.73SQ M
GLUCOSE SERPL-MCNC: 343 MG/DL (ref 65–140)
GLUCOSE SERPL-MCNC: 472 MG/DL (ref 65–140)
GLUCOSE SERPL-MCNC: >500 MG/DL (ref 65–140)
GLUCOSE UR STRIP-MCNC: ABNORMAL MG/DL
HCO3 BLDV-SCNC: 23.7 MMOL/L (ref 24–30)
HCT VFR BLD AUTO: 49.5 % (ref 34.8–46.1)
HGB BLD-MCNC: 16.6 G/DL (ref 11.5–15.4)
HGB UR QL STRIP.AUTO: NEGATIVE
HOLD SPECIMEN: NORMAL
IMM GRANULOCYTES # BLD AUTO: 0.06 THOUSAND/UL (ref 0–0.2)
IMM GRANULOCYTES NFR BLD AUTO: 1 % (ref 0–2)
KETONES UR STRIP-MCNC: NEGATIVE MG/DL
LEUKOCYTE ESTERASE UR QL STRIP: ABNORMAL
LYMPHOCYTES # BLD AUTO: 0.94 THOUSANDS/ÂΜL (ref 0.6–4.47)
LYMPHOCYTES NFR BLD AUTO: 9 % (ref 14–44)
MCH RBC QN AUTO: 28.6 PG (ref 26.8–34.3)
MCHC RBC AUTO-ENTMCNC: 33.5 G/DL (ref 31.4–37.4)
MCV RBC AUTO: 85 FL (ref 82–98)
MONOCYTES # BLD AUTO: 0.49 THOUSAND/ÂΜL (ref 0.17–1.22)
MONOCYTES NFR BLD AUTO: 5 % (ref 4–12)
NEUTROPHILS # BLD AUTO: 8.68 THOUSANDS/ÂΜL (ref 1.85–7.62)
NEUTS SEG NFR BLD AUTO: 85 % (ref 43–75)
NITRITE UR QL STRIP: NEGATIVE
NON-SQ EPI CELLS URNS QL MICRO: NORMAL /HPF
NRBC BLD AUTO-RTO: 0 /100 WBCS
O2 CT BLDV-SCNC: 18.7 ML/DL
PCO2 BLDV: 42.2 MM HG (ref 42–50)
PH BLDV: 7.37 [PH] (ref 7.3–7.4)
PH UR STRIP.AUTO: 6 [PH]
PLATELET # BLD AUTO: 204 THOUSANDS/UL (ref 149–390)
PMV BLD AUTO: 9.7 FL (ref 8.9–12.7)
PO2 BLDV: 43.9 MM HG (ref 35–45)
POTASSIUM SERPL-SCNC: 5 MMOL/L (ref 3.5–5.3)
PROT SERPL-MCNC: 8 G/DL (ref 6.4–8.4)
PROT UR STRIP-MCNC: NEGATIVE MG/DL
RBC # BLD AUTO: 5.81 MILLION/UL (ref 3.81–5.12)
RBC #/AREA URNS AUTO: NORMAL /HPF
SODIUM SERPL-SCNC: 133 MMOL/L (ref 135–147)
SP GR UR STRIP.AUTO: 1.01 (ref 1–1.03)
UROBILINOGEN UR STRIP-ACNC: <2 MG/DL
WBC # BLD AUTO: 10.2 THOUSAND/UL (ref 4.31–10.16)
WBC #/AREA URNS AUTO: NORMAL /HPF

## 2023-11-19 PROCEDURE — 82948 REAGENT STRIP/BLOOD GLUCOSE: CPT

## 2023-11-19 PROCEDURE — 82010 KETONE BODYS QUAN: CPT

## 2023-11-19 PROCEDURE — 96374 THER/PROPH/DIAG INJ IV PUSH: CPT

## 2023-11-19 PROCEDURE — 93005 ELECTROCARDIOGRAM TRACING: CPT

## 2023-11-19 PROCEDURE — 96361 HYDRATE IV INFUSION ADD-ON: CPT

## 2023-11-19 PROCEDURE — 99284 EMERGENCY DEPT VISIT MOD MDM: CPT

## 2023-11-19 PROCEDURE — 82805 BLOOD GASES W/O2 SATURATION: CPT

## 2023-11-19 PROCEDURE — 94640 AIRWAY INHALATION TREATMENT: CPT

## 2023-11-19 PROCEDURE — 80053 COMPREHEN METABOLIC PANEL: CPT | Performed by: EMERGENCY MEDICINE

## 2023-11-19 PROCEDURE — 71045 X-RAY EXAM CHEST 1 VIEW: CPT

## 2023-11-19 PROCEDURE — 81001 URINALYSIS AUTO W/SCOPE: CPT

## 2023-11-19 PROCEDURE — 85025 COMPLETE CBC W/AUTO DIFF WBC: CPT | Performed by: EMERGENCY MEDICINE

## 2023-11-19 PROCEDURE — 36415 COLL VENOUS BLD VENIPUNCTURE: CPT

## 2023-11-19 RX ORDER — IPRATROPIUM BROMIDE AND ALBUTEROL SULFATE 2.5; .5 MG/3ML; MG/3ML
3 SOLUTION RESPIRATORY (INHALATION) ONCE
Status: COMPLETED | OUTPATIENT
Start: 2023-11-19 | End: 2023-11-19

## 2023-11-19 RX ADMIN — SODIUM CHLORIDE 500 ML: 0.9 INJECTION, SOLUTION INTRAVENOUS at 21:44

## 2023-11-19 RX ADMIN — INSULIN HUMAN 4 UNITS: 100 INJECTION, SOLUTION PARENTERAL at 23:35

## 2023-11-19 RX ADMIN — IPRATROPIUM BROMIDE AND ALBUTEROL SULFATE 3 ML: 2.5; .5 SOLUTION RESPIRATORY (INHALATION) at 21:48

## 2023-11-19 NOTE — TELEPHONE ENCOUNTER
Regarding: blood sugar 539/diabetes type 2  ----- Message from Deejay Luna sent at 11/19/2023  5:58 PM EST -----  Pt's daughter stated, "James Johnson is on prednisone and I think this is causing her sugars to go up. Right now her sugar is 539. I am confused about how much insulin I need to give her. She currently is not experiencing any other symptoms. "

## 2023-11-19 NOTE — TELEPHONE ENCOUNTER
Pt is not a current pt with a family practice or speciality . She was a patient in the hospital. Daughter was transferred to hospital  so she can speak with the nurse from floor she was discharged from. She was not interested in taking her mother to the ER for evaluation of high blood sugar. She is aware that this is the option for tonight due to her high blood sugar. She did try to reach to reach her personal PCP but she did not get an answer. Reason for Disposition  • Blood glucose > 500 mg/dL (27.8 mmol/L)    Answer Assessment - Initial Assessment Questions  1. REASON FOR CALL or QUESTION: "What is your reason for calling today?" or "How can I best help you?" or "What question do you have that I can help answer?"       and is on steroids. Unable to reach pcp.     Protocols used: Diabetes - High Blood Sugar-ADULT-, Information Only Call - No Triage-ADULT-AH

## 2023-11-20 VITALS
SYSTOLIC BLOOD PRESSURE: 154 MMHG | HEART RATE: 98 BPM | RESPIRATION RATE: 18 BRPM | TEMPERATURE: 98.5 F | OXYGEN SATURATION: 99 % | DIASTOLIC BLOOD PRESSURE: 73 MMHG

## 2023-11-20 LAB
ATRIAL RATE: 108 BPM
GLUCOSE SERPL-MCNC: 290 MG/DL (ref 65–140)
P AXIS: 71 DEGREES
PR INTERVAL: 208 MS
QRS AXIS: -80 DEGREES
QRSD INTERVAL: 122 MS
QT INTERVAL: 386 MS
QTC INTERVAL: 517 MS
T WAVE AXIS: 52 DEGREES
VENTRICULAR RATE: 108 BPM

## 2023-11-20 PROCEDURE — 99285 EMERGENCY DEPT VISIT HI MDM: CPT | Performed by: EMERGENCY MEDICINE

## 2023-11-20 PROCEDURE — 93010 ELECTROCARDIOGRAM REPORT: CPT | Performed by: INTERNAL MEDICINE

## 2023-11-20 PROCEDURE — 82948 REAGENT STRIP/BLOOD GLUCOSE: CPT

## 2023-11-20 NOTE — DISCHARGE INSTRUCTIONS
Please come back to the ER if you have new or worsening symptoms such as shortness of breath, chest pain, fevers, chills. Also come back to the ER if you are having high blood sugar readings. Please call your doctor in the morning and tell them that the ER doctor urgently needs your medications titrated. You are on steroids which are increasing your blood sugar any need to change your medications to accommodate that.

## 2023-11-20 NOTE — ED ATTENDING ATTESTATION
11/19/2023  IFelipa DO, saw and evaluated the patient. I have discussed the patient with the resident/non-physician practitioner and agree with the resident's/non-physician practitioner's findings, Plan of Care, and MDM as documented in the resident's/non-physician practitioner's note, except where noted. All available labs and Radiology studies were reviewed. I was present for key portions of any procedure(s) performed by the resident/non-physician practitioner and I was immediately available to provide assistance. At this point I agree with the current assessment done in the Emergency Department. I have conducted an independent evaluation of this patient a history and physical is as follows:    ED Course     80year old female with a history of diabetes presents with her daughter for evaluation of elevated BS. Patient had a blood sugar of 560 today. She has been having urinary frequency but denies dysuria. Patient was recently hospitalized for an exacerbation of COPD and placed on corticosteroid. Daughter notes patient continues to wheeze though patient feels she is better. No blurred vision. Patient does note increased thirst.  No abdominal pain. Patient is taking Lantus at nighttime. She does not take rapid acting insulin    Past medical history: Diabetes, COPD, chronic renal failure    Physical exam: Patient is awake and alert, no acute distress. Mucous membranes are slightly dry. Neck is supple. Heart is regular rate with occasional ectopy. Lungs have few scattered wheezes. No increased work of breathing. Abdomen soft nontender nondistended with normoactive bowel sounds. No lower extremity edema noted. No focal motor or sensory deficits. Assessment/plan: 42-year-old female presents with hyperglycemia.   Differential diagnosis includes but is not limited to acute diabetic ketoacidosis, hyperosmolar hyperglycemic state, dehydration, side effect of corticosteroid, electrolyte abnormality, urinary tract infection    We will check electrolytes, blood sugar, start gentle hydration and insulin. Reassess. Update: Patient did have improvement in the level of her blood sugar. She is without signs of diabetic ketoacidosis. Electrolytes within normal limits. Patient may need rapid acting insulin to use while taking corticosteroid. Recommend that she contact her PCP in the a.m. for reevaluation. Urine is negative for signs of infection.   This was discussed at length with the patient's daughter who was reluctant but ultimately agreed with outpatient plan  Critical Care Time  Procedures

## 2023-11-21 ENCOUNTER — TELEPHONE (OUTPATIENT)
Dept: ENDOCRINOLOGY | Facility: CLINIC | Age: 88
End: 2023-11-21

## 2023-11-21 NOTE — TELEPHONE ENCOUNTER
Received referral for [email or web address]. Left voicemail for patient to contact office to schedule Consult/New Patient Appointment.

## 2023-11-22 NOTE — ED PROVIDER NOTES
History  Chief Complaint   Patient presents with    Hyperglycemia - Symptomatic     Pt reports BS >530 at home. +dizziness     Patient is a 80year old female with COPD and T2DM who presented to ED for hyperglycemia. Patient's mother stated that she obtained blood glucose reading in 500s at home and decided to bring in mother for evaluation. Notably, patient had recently been discharged from ED a few days ago for COPD exacerbation and has been on corticosteroids which likely contributed to higher blood glucose at home. Patient had no complaints on evaluation other than feeling fatigued and stating that her breathing felt a little tighter than usual but no christelle SOB, increased work of breathing, or worsening cough. In the department, patient was found to have blood glucose 472. The patient endorsed polyuria. Patient denied any fevers, chills, or other systemic symptoms. Prior to Admission Medications   Prescriptions Last Dose Informant Patient Reported? Taking?    BD PEN NEEDLE SKYLER U/F 32G X 4 MM MISC  Child Yes No   Si SYRINGE BY MISCELLANEOUS ROUTE DAILY   Calcium Carb-Cholecalciferol (CALTRATE 600+D3 SOFT PO)  Child Yes No   Sig: Take 2 tablets by mouth   Dulaglutide 1.5 MG/0.5ML SOPN  Child Yes No   Sig: Inject 1.5 mg under the skin once a week   JARDIANCE 10 MG TABS  Child Yes No   Sig: Take 10 mg by mouth daily   Multiple Vitamin (multivitamin) tablet   Yes No   Sig: Take 1 tablet by mouth daily   ONE TOUCH ULTRA TEST test strip  Child Yes No   Sig: USE FOR BLOOD GLUCOSE TESTING AS DIRECTED TWICE DAILY   ONETOUCH DELICA LANCETS 32Y MISC  Child Yes No   Si SYRINGE BY MISCELLANEOUS ROUTE 2 TIMES A DAY AS NEEDED DX E11.9 LAST O/V 10/20/18   albuterol (ACCUNEB) 1.25 MG/3ML nebulizer solution   Yes No   Sig: Take 1.25 mg by nebulization 2 (two) times a day   albuterol (PROVENTIL HFA,VENTOLIN HFA) 90 mcg/act inhaler  Child Yes No   Sig: Inhale 2 puffs every 4 (four) hours as needed   aspirin (ECOTRIN LOW STRENGTH) 81 mg EC tablet  Child Yes No   Sig: Take 81 mg by mouth daily   atorvastatin (LIPITOR) 40 mg tablet  Child Yes No   Sig: Take 40 mg by mouth daily   benzonatate (TESSALON) 200 MG capsule   No No   Sig: Take 1 capsule (200 mg total) by mouth 3 (three) times a day   clobetasol (TEMOVATE) 0.05 % ointment   No No   Sig: Apply topically daily at bedtime   docusate sodium (COLACE) 100 mg capsule  Child Yes No   Sig: Take 100 mg by mouth 1 (one) time   fluticasone (FLONASE) 50 mcg/act nasal spray  Child Yes No   Si spray into each nostril   fluticasone-umeclidinium-vilanterol (Trelegy Ellipta) 200-62.5-25 mcg/actuation AEPB inhaler  Child No No   Sig: Inhale 1 puff daily Rinse mouth after use. guaiFENesin (MUCINEX) 600 mg 12 hr tablet   No No   Sig: Take 1 tablet (600 mg total) by mouth 2 (two) times a day   insulin glargine (LANTUS SOLOSTAR) 100 units/mL injection pen   No No   Sig: Inject 15 Units under the skin daily at bedtime   ipratropium (ATROVENT) 0.06 % nasal spray  Child No No   Si sprays into each nostril 2 (two) times a day   levalbuterol (XOPENEX) 0.63 mg/3 mL nebulizer solution   No No   Sig: Take 3 mL (0.63 mg total) by nebulization 2 (two) times a day   levothyroxine 100 mcg tablet  Child Yes No   Sig: Take 100 mcg by mouth daily   loratadine (CLARITIN) 10 mg tablet  Child Yes No   Sig: Take 10 mg by mouth daily   oxybutynin (DITROPAN-XL) 10 MG 24 hr tablet  Child Yes No   Sig: Take 10 mg by mouth daily   pantoprazole (PROTONIX) 40 mg tablet   No No   Sig: Take 1 tablet (40 mg total) by mouth daily in the early morning Do not start before 2023. predniSONE 20 mg tablet   No No   Sig: Take 2 tablets (40 mg total) by mouth daily for 3 days, THEN 1.5 tablets (30 mg total) daily for 3 days, THEN 1 tablet (20 mg total) daily for 3 days, THEN 0.5 tablets (10 mg total) daily for 3 days. Do not start before 2023.    valsartan (DIOVAN) 320 MG tablet  Child Yes No Facility-Administered Medications: None       Past Medical History:   Diagnosis Date    Arthritis     Asthma     COPD (chronic obstructive pulmonary disease) (HCC)     Diabetes mellitus ([address])     Difficulty walking     Gout     Hyperlipidemia     Hypertension     Hypothyroidism     Neuropathy in diabetes ([address])     Overactive bladder     PVD (peripheral vascular disease) ([address])     [place]        Past Surgical History:   Procedure Laterality Date    ARTERIAL BYPASS SURGERY      CAROTID ENDARTERECTOMY Right     CATARACT EXTRACTION      HYSTERECTOMY      NEPHRECTOMY      in her 25s       Family History   Problem Relation Age of Onset    Asthma Mother     Asthma Father     Diabetes Father     Arthritis Father     Hypertension Father     Lung cancer Sister     Early death Brother 39    COPD Daughter     Asthma Daughter     Rheum arthritis Daughter     Cataracts Daughter     Liver cancer Son     Alzheimer's disease Sister     Rectal cancer Sister     Thyroid cancer Sister     No Known Problems Sister     Asthma Son      I have reviewed and agree with the history as documented. E-Cigarette/Vaping    E-Cigarette Use Never User      E-Cigarette/Vaping Substances    Nicotine No     THC No     CBD No     Flavoring No     Other No     Unknown No      Social History     Tobacco Use    Smoking status: Former     Packs/day: 1.00     Years: 20.00     Total pack years: 20.00     Types: Cigarettes     Start date:      Quit date:      Years since quittin.9    Smokeless tobacco: Former   Vaping Use    Vaping Use: Never used   Substance Use Topics    Alcohol use: Not Currently     Alcohol/week: 1.0 standard drink of alcohol     Types: 1 Glasses of wine per week    Drug use: Never        Review of Systems   Constitutional:  Positive for fatigue. Negative for chills, diaphoresis and fever. HENT: Negative. Respiratory:  Positive for cough. Negative for shortness of breath.     Cardiovascular:  Negative for chest pain and palpitations. Gastrointestinal:  Negative for abdominal pain, nausea and vomiting. Genitourinary:  Negative for dysuria, frequency and urgency. Musculoskeletal:  Negative for arthralgias and neck pain. Skin:  Negative for color change and pallor. Neurological:  Negative for dizziness and syncope. Psychiatric/Behavioral:  Negative for agitation, behavioral problems and confusion. All other systems reviewed and are negative. Physical Exam  ED Triage Vitals   Temperature Pulse Respirations Blood Pressure SpO2   11/19/23 1930 11/19/23 1930 11/19/23 1930 11/19/23 1930 11/19/23 1930   98.5 °F (36.9 °C) (!) 118 20 162/83 99 %      Temp Source Heart Rate Source Patient Position - Orthostatic VS BP Location FiO2 (%)   11/19/23 1930 11/19/23 1930 11/19/23 2245 11/19/23 1930 --   Oral Monitor Lying Right arm       Pain Score       --                    Orthostatic Vital Signs  Vitals:    11/19/23 2245 11/19/23 2300 11/19/23 2330 11/20/23 0000   BP: (!) 176/86 167/77 162/72 154/73   Pulse: 93 91 91 98   Patient Position - Orthostatic VS: Lying   Lying       Physical Exam  Vitals and nursing note reviewed. Constitutional:       Appearance: Normal appearance. HENT:      Head: Normocephalic. Nose: Nose normal.      Mouth/Throat:      Mouth: Mucous membranes are dry. Eyes:      Extraocular Movements: Extraocular movements intact. Conjunctiva/sclera: Conjunctivae normal.      Pupils: Pupils are equal, round, and reactive to light. Cardiovascular:      Rate and Rhythm: Normal rate and regular rhythm. Pulses: Normal pulses. Heart sounds: Normal heart sounds. Pulmonary:      Effort: Pulmonary effort is normal.      Breath sounds: Wheezing present. Comments: Moderate scattered b/l wheezes  Abdominal:      General: Abdomen is flat. Bowel sounds are normal.      Palpations: Abdomen is soft. Tenderness: There is no abdominal tenderness.    Skin:     General: Skin is warm and dry.      Capillary Refill: Capillary refill takes less than 2 seconds. Neurological:      General: No focal deficit present. Mental Status: She is alert and oriented to person, place, and time. Psychiatric:         Mood and Affect: Mood normal.         Behavior: Behavior normal.         Thought Content: Thought content normal.         ED Medications  Medications   ipratropium-albuterol (DUO-NEB) 0.5-2.5 mg/3 mL inhalation solution 3 mL (3 mL Nebulization Given 11/19/23 2148)   sodium chloride 0.9 % bolus 500 mL (0 mL Intravenous Stopped 11/19/23 2244)   insulin regular (HumuLIN R,NovoLIN R) injection 4 Units (4 Units Intravenous Given 11/19/23 2335)       Diagnostic Studies  Results Reviewed       Procedure Component Value Units Date/Time    Fingerstick Glucose (POCT) [[id number]]  (Abnormal) Collected: 11/20/23 0004    Lab Status: Final result Updated: 11/20/23 0015     POC Glucose 290 mg/dl     Fingerstick Glucose (POCT) [[id number]]  (Abnormal) Collected: 11/19/23 2325    Lab Status: Final result Updated: 11/19/23 2326     POC Glucose 343 mg/dl     Urine Microscopic [[id number]]  (Normal) Collected: 11/19/23 2241    Lab Status: Final result Specimen: Urine, Clean Catch Updated: 11/19/23 2312     RBC, UA None Seen /hpf      WBC, UA 1-2 /hpf      Epithelial Cells Occasional /hpf      Bacteria, UA None Seen /hpf     UA w Reflex to Microscopic w Reflex to Culture [[id number]]  (Abnormal) Collected: 11/19/23 2241    Lab Status: Final result Specimen: Urine, Clean Catch Updated: 11/19/23 2304     Color, UA Colorless     Clarity, UA Clear     Specific Gravity, UA 1.015     pH, UA 6.0     Leukocytes, UA Elevated glucose may cause decreased leukocyte values.  See urine microscopic for UWBC result     Nitrite, UA Negative     Protein, UA Negative mg/dl      Glucose, UA >=1000 (1%) mg/dl      Ketones, UA Negative mg/dl      Urobilinogen, UA <2.0 mg/dl      Bilirubin, UA Negative     Occult Blood, UA Negative    Baxter draw [[id number]] Collected: 11/19/23 2126    Lab Status: Final result Specimen: Blood from Arm, Right Updated: 11/19/23 2301    Narrative: The following orders were created for panel order [place] draw. Procedure                               Abnormality         Status                     ---------                               -----------         ------                     Nerissa Ruths Top on VFVQ[[id number]]                           Final result               Gold top on ZZRM[[id number]]                                 Final result               Green / Yellow tube on QTTM[[id number]]                      Final result               Green / Black tube on LQEA[[id number]]                       Final result               Lavender Top 3 ml on HSWP[[id number]]                        Final result                 Please view results for these tests on the individual orders.     Beta Hydroxybutyrate [[id number]]  (Normal) Collected: 11/19/23 2143    Lab Status: Final result Specimen: Blood from Arm, Right Updated: 11/19/23 2153     BETA-HYDROXYBUTYRATE 0.1 mmol/L     Blood gas, venous [[id number]]  (Abnormal) Collected: 11/19/23 2143    Lab Status: Final result Specimen: Blood from Arm, Right Updated: 11/19/23 2151     pH, Juvenal 7.368     pCO2, Juvenal 42.2 mm Hg      pO2, Juvenal 43.9 mm Hg      HCO3, Juvenal 23.7 mmol/L      Base Excess, Juvenal -1.6 mmol/L      O2 Content, Juvenal 18.7 ml/dL      O2 HGB, VENOUS 77.7 %     Comprehensive metabolic panel [[id number]]  (Abnormal) Collected: 11/19/23 2126    Lab Status: Final result Specimen: Blood from Arm, Right Updated: 11/19/23 2149     Sodium 133 mmol/L      Potassium 5.0 mmol/L      Chloride 99 mmol/L      CO2 24 mmol/L      ANION GAP 10 mmol/L      BUN 44 mg/dL      Creatinine 1.88 mg/dL      Glucose 472 mg/dL      Calcium 9.4 mg/dL      AST 15 U/L      ALT 22 U/L      Alkaline Phosphatase 88 U/L      Total Protein 8.0 g/dL      Albumin 4.2 g/dL      Total Bilirubin 0.49 mg/dL      eGFR 22 ml/min/1.73sq m     Narrative:      National Kid[employer] Disease Foundation guidelines for Chronic Kidney Disease (CKD):     Stage 1 with normal or high GFR (GFR > 90 mL/min/1.73 square meters)    Stage 2 Mild CKD (GFR = 60-89 mL/min/1.73 square meters)    Stage 3A Moderate CKD (GFR = 45-59 mL/min/1.73 square meters)    Stage 3B Moderate CKD (GFR = 30-44 mL/min/1.73 square meters)    Stage 4 Severe CKD (GFR = 15-29 mL/min/1.73 square meters)    Stage 5 End Stage CKD (GFR <15 mL/min/1.73 square meters)  Note: GFR calculation is accurate only with a steady state creatinine    CBC and differential [[id number]]  (Abnormal) Collected: 11/19/23 2126    Lab Status: Final result Specimen: Blood from Arm, Right Updated: 11/19/23 2135     WBC 10.20 Thousand/uL      RBC 5.81 Million/uL      Hemoglobin 16.6 g/dL      Hematocrit 49.5 %      MCV 85 fL      MCH 28.6 pg      MCHC 33.5 g/dL      RDW 15.7 %      MPV 9.7 fL      Platelets 288 Thousands/uL      nRBC 0 /100 WBCs      Neutrophils Relative 85 %      Immat GRANS % 1 %      Lymphocytes Relative 9 %      Monocytes Relative 5 %      Eosinophils Relative 0 %      Basophils Relative 0 %      Neutrophils Absolute 8.68 Thousands/µL      Immature Grans Absolute 0.06 Thousand/uL      Lymphocytes Absolute 0.94 Thousands/µL      Monocytes Absolute 0.49 Thousand/µL      Eosinophils Absolute 0.00 Thousand/µL      Basophils Absolute 0.03 Thousands/µL     Fingerstick Glucose (POCT) [[id number]]  (Abnormal) Collected: 11/19/23 1932    Lab Status: Final result Updated: 11/19/23 1932     POC Glucose >500 mg/dl                    XR chest 1 view portable   Final Result by Sohan Haro MD (11/20 1503)      No acute cardiopulmonary disease.                Workstation performed: [id number]               Procedures  ECG 12 Lead Documentation Only    Date/Time: 11/20/2023 12:29 AM    Performed by: Janice Babb MD  Authorized by: Janice Babb MD    Indications / Diagnosis:  SOB  Rate: ECG rate:  108    ECG rate assessment: tachycardic    Rhythm:     Rhythm: sinus tachycardia    Ectopy:     Ectopy: PVCs      PVCs:  Frequent  QRS:     QRS axis:  Normal    QRS intervals:  Normal  Conduction:     Conduction: abnormal      Abnormal conduction: complete RBBB    ST segments:     ST segments:  Normal  T waves:     T waves: normal    Comments:      Sinus tachycardia with frequent Premature ventricular complexes  Possible Left atrial enlargement  Left axis deviation  Right bundle branch block           ED Course  ED Course as of 11/22/23 1114   Mon Nov 20, 2023   1427 ECG 12 lead  EKG without ischemic process   1503 XR chest 1 view portable  CXR grossly unremarkable   2126 WBC(!): 10.20   2143 Blood gas, venous(!)  Grossly unremarkable vbg, normal b-hydroxybutyrate, DKA unlikely. 2148 Duoneb given for wheezing   2335 Insulin given to lower blood sugar. Wed Nov 22, 2023   1055 ECG 12 lead   1057 Urine Microscopic  Not suggestive of UTI. Medical Decision Making  Patient is a 80year old female with COPD and T2DM who presented to ED for hyperglycemia. Patient's daughter stated that she obtained blood glucose reading in 500s at home and decided to bring in mother for evaluation. Notably, patient had recently been discharged from ED a few days ago for COPD exacerbation and has been on corticosteroids which likely contributed to higher blood glucose at home. Patient had no complaints on evaluation other than feeling fatigued and stating that her breathing felt a little tighter than usual but no christelle SOB, increased work of breathing, or worsening cough. In the department, patient was found to have blood glucose 472. The patient endorsed polyuria. Patient denied any fevers, chills, or other systemic symptoms. Patient was well appearing and without any complaints for duration of ED visit.  Although blood glucose was 473 initially at ED, there were no signs of DKA with normal vbg, normal b-hydroxybutyrate, and no symptoms other than the polyuria. Patient was given fluids as well as insulin with frequent rechecks, last glucose POCT approx 250. Hyperglycemia likely mediated by steroids that were recently started for COPD exacerbation. The patient did endorse some continued cough and chest tightness and 1 dose of duoneb was given with good response. Patient stated that she felt better and wanted to go home. The patient and daughter were told and it was written and circled on discharge paperwork to call her primary doctor first thing tomorrow morning to try to titrate medications to support the likely steroid-induced hyperglycemia. Patient was counseled extensively on reasons to come back to the ER and discharged with explicit return precautions. Amount and/or Complexity of Data Reviewed  Labs: ordered. Radiology: ordered. Risk  OTC drugs. Prescription drug management. Disposition  Final diagnoses:   Hyperglycemia   SOB (shortness of breath)   Diabetes mellitus, type 2 ([address])     Time reflects when diagnosis was documented in both MDM as applicable and the Disposition within this note       Time User Action Codes Description Comment    11/19/2023 11:55 PM Verlan Rides Add [R73.9] Hyperglycemia     11/19/2023 11:55 PM Verlan Rides Add [R06.02] SOB (shortness of breath)     11/20/2023 12:07 AM Verlan Rides Add [E11.9] Diabetes mellitus, type 2 [facility])           ED Disposition       ED Disposition   Discharge    Condition   Stable    Date/Time   Sun Nov 19, 2023 11:55 PM    Comment   Eddie November discharge to home/self care.                    Follow-up Information       Follow up With Specialties Details Why Contact Info Additional [address] [facility]   [address] [place] [id number]  [address] [place] [address] for Diabetes and Endocrinology [address], [place], [address], [phone]            Discharge Medication List as of 11/20/2023 12:08 AM        CONTINUE these medications which have NOT CHANGED    Details   albuterol (ACCUNEB) 1.25 MG/3ML nebulizer solution Take 1.25 mg by nebulization 2 (two) times a day, Historical Med      albuterol (PROVENTIL HFA,VENTOLIN HFA) 90 mcg/act inhaler Inhale 2 puffs every 4 (four) hours as needed, Starting Tue 4/16/2019, Historical Med      aspirin (ECOTRIN LOW STRENGTH) 81 mg EC tablet Take 81 mg by mouth daily, Historical Med      atorvastatin (LIPITOR) 40 mg tablet Take 40 mg by mouth daily, Historical Med      BD PEN NEEDLE SKYLER U/F 32G X 4 MM MISC 1 SYRINGE BY MISCELLANEOUS ROUTE DAILY, Historical Med      benzonatate (TESSALON) 200 MG capsule Take 1 capsule (200 mg total) by mouth 3 (three) times a day, Starting Thu 11/16/2023, Normal      Calcium Carb-Cholecalciferol (CALTRATE 600+D3 SOFT PO) Take 2 tablets by mouth, Historical Med      clobetasol (TEMOVATE) 0.05 % ointment Apply topically daily at bedtime, Starting Thu 10/19/2023, Normal      docusate sodium (COLACE) 100 mg capsule Take 100 mg by mouth 1 (one) time, Historical Med      Dulaglutide 1.5 MG/0.5ML SOPN Inject 1.5 mg under the skin once a week, Historical Med      fluticasone (FLONASE) 50 mcg/act nasal spray 1 spray into each nostril, Historical Med      fluticasone-umeclidinium-vilanterol (Trelegy Ellipta) 200-62.5-25 mcg/actuation AEPB inhaler Inhale 1 puff daily Rinse mouth after use., Starting Fri 5/26/2023, Until Thu 10/19/2023, Normal      guaiFENesin (MUCINEX) 600 mg 12 hr tablet Take 1 tablet (600 mg total) by mouth 2 (two) times a day, Starting Thu 11/16/2023, No Print      insulin glargine (LANTUS SOLOSTAR) 100 units/mL injection pen Inject 15 Units under the skin daily at bedtime, Starting Thu 11/16/2023, No Print      ipratropium (ATROVENT) 0.06 % nasal spray 2 sprays into each nostril 2 (two) times a day, Starting Wed 4/20/2022, Normal      Bethany Muster 10 MG TABS Take 10 mg by mouth daily, Starting Tue 4/16/2019, Historical Med      levalbuterol (XOPENEX) 0.63 mg/3 mL nebulizer solution Take 3 mL (0.63 mg total) by nebulization 2 (two) times a day, Starting Thu 11/16/2023, No Print      levothyroxine 100 mcg tablet Take 100 mcg by mouth daily, Historical Med      loratadine (CLARITIN) 10 mg tablet Take 10 mg by mouth daily, Historical Med      Multiple Vitamin (multivitamin) tablet Take 1 tablet by mouth daily, Historical Med      ONE TOUCH ULTRA TEST test strip USE FOR BLOOD GLUCOSE TESTING AS DIRECTED TWICE DAILY, Historical Med      ONETOUCH DELICA LANCETS 62D MISC 1 SYRINGE BY MISCELLANEOUS ROUTE 2 TIMES A DAY AS NEEDED DX E11.9 LAST O/V 10/20/18, Historical Med      oxybutynin (DITROPAN-XL) 10 MG 24 hr tablet Take 10 mg by mouth daily, Starting Mon 4/22/2019, Historical Med      pantoprazole (PROTONIX) 40 mg tablet Take 1 tablet (40 mg total) by mouth daily in the early morning Do not start before November 17, 2023., Starting Fri 11/17/2023, Normal      predniSONE 20 mg tablet Multiple Dosages:Starting Fri 11/17/2023, Until Sun 11/19/2023 at 2359, THEN Starting Mon 11/20/2023, Until Wed 11/22/2023 at 2359, THEN Starting Thu 11/23/2023, Until Sat 11/25/2023 at 2359, THEN Starting Sun 11/26/2023, Until Tue 11/28/2023 at 2359 Take 2 tablets (40 mg total) by mouth daily for 3 days, THEN 1.5 tablets (30 mg total) daily for 3 days, THEN 1 tablet (20 mg total) daily for 3 days, THEN 0.5 tablets (10 mg total) daily for 3 days. Do not start before November 17, 2023., Normal      valsartan (DIOVAN) 320 MG tablet Starting Fri 11/12/2021, Historical Med               PDMP Review       None             ED Provider  Attending physically available and evaluated Alexandra Garibay. I managed the patient along with the ED Attending.     Electronically Signed by           Tom Guzman MD  11/22/23 9737

## 2024-01-30 ENCOUNTER — OFFICE VISIT (OUTPATIENT)
Dept: PODIATRY | Facility: CLINIC | Age: 89
End: 2024-01-30
Payer: MEDICARE

## 2024-01-30 VITALS
BODY MASS INDEX: 30.95 KG/M2 | HEART RATE: 98 BPM | HEIGHT: 61 IN | RESPIRATION RATE: 18 BRPM | SYSTOLIC BLOOD PRESSURE: 192 MMHG | DIASTOLIC BLOOD PRESSURE: 114 MMHG

## 2024-01-30 DIAGNOSIS — L84 CORNS: ICD-10-CM

## 2024-01-30 DIAGNOSIS — E11.9 CONTROLLED TYPE 2 DIABETES MELLITUS WITHOUT COMPLICATION, WITHOUT LONG-TERM CURRENT USE OF INSULIN (HCC): Primary | ICD-10-CM

## 2024-01-30 PROCEDURE — G0127 TRIM NAIL(S): HCPCS | Performed by: PODIATRIST

## 2024-01-30 PROCEDURE — 11055 PARING/CUTG B9 HYPRKER LES 1: CPT | Performed by: PODIATRIST

## 2024-01-30 NOTE — PROGRESS NOTES
Established patient with class findings presents for nail and lesion care.  Vascular exam:  DP  0/4 bilateral; PT  0 4 bilateral   Dermatological exam:  Each toenail is thick and  dystrophic.  Hyperkeratotic lesion PIPJ left fifth toe  Diagnosis:  Diabetes mellitus; hyperkeratotic lesion left fifth toe; PVD  Treatment: Trimmed multiple dystrophic toenails.  Trimmed hyperkeratotic lesion left fifth toe.    Note: Patient had elevated blood pressure.  Justyna advised daughter that she should be seen by medical doctor.    Nail removal    Date/Time: 1/30/2024 3:00 PM    Performed by: Manny Khoury DPM  Authorized by: Manny Khoury DPM    Nails trimmed:     Number of nails trimmed:  10  Lesion Destruction    Date/Time: 1/30/2024 3:00 PM    Performed by: Manny Khoury DPM  Authorized by: Manny Khoury DPM    Procedure Details - Lesion Destruction:     Number of Lesions:  2  Lesion 1:     Body area:  Lower extremity    Lower extremity location:  L little toe    Malignancy: benign hyperkeratotic lesion      Destruction method: scissors used for extraction    Lesion 2:     Body area:  Lower extremity    Lower extremity location:  R second toe    Malignancy: benign hyperkeratotic lesion      Destruction method: surgical removal

## 2024-03-04 ENCOUNTER — APPOINTMENT (OUTPATIENT)
Dept: LAB | Facility: CLINIC | Age: 89
End: 2024-03-04
Payer: MEDICARE

## 2024-03-04 DIAGNOSIS — I10 ESSENTIAL HYPERTENSION, MALIGNANT: ICD-10-CM

## 2024-03-04 DIAGNOSIS — E11.00 TYPE II DIABETES MELLITUS WITH HYPEROSMOLARITY, UNCONTROLLED (HCC): ICD-10-CM

## 2024-03-04 DIAGNOSIS — E03.9 HYPOTHYROIDISM, UNSPECIFIED TYPE: ICD-10-CM

## 2024-03-04 DIAGNOSIS — Z79.899 ENCOUNTER FOR LONG-TERM (CURRENT) USE OF OTHER MEDICATIONS: ICD-10-CM

## 2024-03-04 DIAGNOSIS — E11.65 TYPE II DIABETES MELLITUS WITH HYPEROSMOLARITY, UNCONTROLLED (HCC): ICD-10-CM

## 2024-03-04 DIAGNOSIS — E78.5 HYPERLIPIDEMIA, UNSPECIFIED HYPERLIPIDEMIA TYPE: ICD-10-CM

## 2024-03-04 DIAGNOSIS — N18.4 CHRONIC KIDNEY DISEASE, STAGE IV (SEVERE) (HCC): ICD-10-CM

## 2024-03-04 LAB
ALBUMIN SERPL BCP-MCNC: 3.9 G/DL (ref 3.5–5)
ALP SERPL-CCNC: 71 U/L (ref 34–104)
ALT SERPL W P-5'-P-CCNC: 11 U/L (ref 7–52)
ANION GAP SERPL CALCULATED.3IONS-SCNC: 7 MMOL/L
AST SERPL W P-5'-P-CCNC: 16 U/L (ref 13–39)
BASOPHILS # BLD AUTO: 0.06 THOUSANDS/ÂΜL (ref 0–0.1)
BASOPHILS NFR BLD AUTO: 1 % (ref 0–1)
BILIRUB SERPL-MCNC: 0.48 MG/DL (ref 0.2–1)
BUN SERPL-MCNC: 34 MG/DL (ref 5–25)
CALCIUM SERPL-MCNC: 9.4 MG/DL (ref 8.4–10.2)
CHLORIDE SERPL-SCNC: 105 MMOL/L (ref 96–108)
CHOLEST SERPL-MCNC: 149 MG/DL
CO2 SERPL-SCNC: 26 MMOL/L (ref 21–32)
CREAT SERPL-MCNC: 1.96 MG/DL (ref 0.6–1.3)
CREAT UR-MCNC: 40.5 MG/DL
EOSINOPHIL # BLD AUTO: 0.44 THOUSAND/ÂΜL (ref 0–0.61)
EOSINOPHIL NFR BLD AUTO: 6 % (ref 0–6)
ERYTHROCYTE [DISTWIDTH] IN BLOOD BY AUTOMATED COUNT: 13.8 % (ref 11.6–15.1)
GFR SERPL CREATININE-BSD FRML MDRD: 21 ML/MIN/1.73SQ M
GLUCOSE P FAST SERPL-MCNC: 133 MG/DL (ref 65–99)
HCT VFR BLD AUTO: 46.2 % (ref 34.8–46.1)
HDLC SERPL-MCNC: 76 MG/DL
HGB BLD-MCNC: 15 G/DL (ref 11.5–15.4)
IMM GRANULOCYTES # BLD AUTO: 0.04 THOUSAND/UL (ref 0–0.2)
IMM GRANULOCYTES NFR BLD AUTO: 1 % (ref 0–2)
LDLC SERPL CALC-MCNC: 47 MG/DL (ref 0–100)
LYMPHOCYTES # BLD AUTO: 2.6 THOUSANDS/ÂΜL (ref 0.6–4.47)
LYMPHOCYTES NFR BLD AUTO: 34 % (ref 14–44)
MCH RBC QN AUTO: 28.6 PG (ref 26.8–34.3)
MCHC RBC AUTO-ENTMCNC: 32.5 G/DL (ref 31.4–37.4)
MCV RBC AUTO: 88 FL (ref 82–98)
MICROALBUMIN UR-MCNC: 55.4 MG/L
MICROALBUMIN/CREAT 24H UR: 137 MG/G CREATININE (ref 0–30)
MONOCYTES # BLD AUTO: 0.52 THOUSAND/ÂΜL (ref 0.17–1.22)
MONOCYTES NFR BLD AUTO: 7 % (ref 4–12)
NEUTROPHILS # BLD AUTO: 4 THOUSANDS/ÂΜL (ref 1.85–7.62)
NEUTS SEG NFR BLD AUTO: 51 % (ref 43–75)
NONHDLC SERPL-MCNC: 73 MG/DL
NRBC BLD AUTO-RTO: 0 /100 WBCS
PLATELET # BLD AUTO: 168 THOUSANDS/UL (ref 149–390)
PMV BLD AUTO: 10.6 FL (ref 8.9–12.7)
POTASSIUM SERPL-SCNC: 4.3 MMOL/L (ref 3.5–5.3)
PROT SERPL-MCNC: 7.3 G/DL (ref 6.4–8.4)
RBC # BLD AUTO: 5.24 MILLION/UL (ref 3.81–5.12)
SODIUM SERPL-SCNC: 138 MMOL/L (ref 135–147)
TRIGL SERPL-MCNC: 131 MG/DL
URATE SERPL-MCNC: 5.4 MG/DL (ref 2–7.5)
WBC # BLD AUTO: 7.66 THOUSAND/UL (ref 4.31–10.16)

## 2024-03-04 PROCEDURE — 84550 ASSAY OF BLOOD/URIC ACID: CPT

## 2024-03-04 PROCEDURE — 83036 HEMOGLOBIN GLYCOSYLATED A1C: CPT

## 2024-03-04 PROCEDURE — 85025 COMPLETE CBC W/AUTO DIFF WBC: CPT

## 2024-03-04 PROCEDURE — 80053 COMPREHEN METABOLIC PANEL: CPT

## 2024-03-04 PROCEDURE — 82570 ASSAY OF URINE CREATININE: CPT

## 2024-03-04 PROCEDURE — 36415 COLL VENOUS BLD VENIPUNCTURE: CPT

## 2024-03-04 PROCEDURE — 82043 UR ALBUMIN QUANTITATIVE: CPT

## 2024-03-04 PROCEDURE — 80061 LIPID PANEL: CPT

## 2024-03-04 PROCEDURE — 84443 ASSAY THYROID STIM HORMONE: CPT

## 2024-03-05 LAB
EST. AVERAGE GLUCOSE BLD GHB EST-MCNC: 252 MG/DL
HBA1C MFR BLD: 10.4 %

## 2024-03-06 LAB — MISCELLANEOUS LAB TEST RESULT: NORMAL

## 2024-04-12 ENCOUNTER — ESTABLISHED COMPREHENSIVE EXAM (OUTPATIENT)
Dept: URBAN - METROPOLITAN AREA CLINIC 6 | Facility: CLINIC | Age: 89
End: 2024-04-12

## 2024-04-12 DIAGNOSIS — Z79.4: ICD-10-CM

## 2024-04-12 DIAGNOSIS — H35.3131: ICD-10-CM

## 2024-04-12 DIAGNOSIS — Z96.1: ICD-10-CM

## 2024-04-12 DIAGNOSIS — E11.9: ICD-10-CM

## 2024-04-12 PROCEDURE — 92014 COMPRE OPH EXAM EST PT 1/>: CPT

## 2024-04-12 PROCEDURE — 92134 CPTRZ OPH DX IMG PST SGM RTA: CPT

## 2024-04-12 ASSESSMENT — VISUAL ACUITY
OD_CC: 20/40+1
OS_CC: 20/30-1

## 2024-04-12 ASSESSMENT — TONOMETRY
OS_IOP_MMHG: 9
OD_IOP_MMHG: 10

## 2024-04-26 ENCOUNTER — APPOINTMENT (OUTPATIENT)
Dept: LAB | Facility: CLINIC | Age: 89
End: 2024-04-26
Payer: MEDICARE

## 2024-04-26 DIAGNOSIS — D63.1 ANEMIA OF CHRONIC RENAL FAILURE, UNSPECIFIED CKD STAGE: ICD-10-CM

## 2024-04-26 DIAGNOSIS — E03.9 HYPOTHYROIDISM, UNSPECIFIED TYPE: ICD-10-CM

## 2024-04-26 DIAGNOSIS — N18.9 ANEMIA OF CHRONIC RENAL FAILURE, UNSPECIFIED CKD STAGE: ICD-10-CM

## 2024-04-26 DIAGNOSIS — E11.69 TYPE 2 DIABETES MELLITUS WITH OTHER SPECIFIED COMPLICATION, UNSPECIFIED WHETHER LONG TERM INSULIN USE (HCC): ICD-10-CM

## 2024-04-26 DIAGNOSIS — I10 ESSENTIAL HYPERTENSION, MALIGNANT: ICD-10-CM

## 2024-04-26 DIAGNOSIS — E11.29 TYPE 2 DIABETES MELLITUS WITH OTHER KIDNEY COMPLICATION, UNSPECIFIED WHETHER LONG TERM INSULIN USE (HCC): ICD-10-CM

## 2024-04-26 DIAGNOSIS — E78.00 PURE HYPERCHOLESTEROLEMIA: ICD-10-CM

## 2024-04-26 DIAGNOSIS — Z79.4 ENCOUNTER FOR LONG-TERM (CURRENT) USE OF INSULIN (HCC): ICD-10-CM

## 2024-04-26 DIAGNOSIS — N18.9 CHRONIC KIDNEY DISEASE, UNSPECIFIED CKD STAGE: ICD-10-CM

## 2024-04-26 DIAGNOSIS — E11.65 INADEQUATELY CONTROLLED DIABETES MELLITUS (HCC): ICD-10-CM

## 2024-04-26 LAB
ALBUMIN SERPL BCP-MCNC: 3.7 G/DL (ref 3.5–5)
ALP SERPL-CCNC: 68 U/L (ref 34–104)
ALT SERPL W P-5'-P-CCNC: 10 U/L (ref 7–52)
ANION GAP SERPL CALCULATED.3IONS-SCNC: 7 MMOL/L (ref 4–13)
AST SERPL W P-5'-P-CCNC: 14 U/L (ref 13–39)
BILIRUB SERPL-MCNC: 0.6 MG/DL (ref 0.2–1)
BUN SERPL-MCNC: 24 MG/DL (ref 5–25)
CALCIUM SERPL-MCNC: 9.4 MG/DL (ref 8.4–10.2)
CHLORIDE SERPL-SCNC: 104 MMOL/L (ref 96–108)
CO2 SERPL-SCNC: 28 MMOL/L (ref 21–32)
CREAT SERPL-MCNC: 1.62 MG/DL (ref 0.6–1.3)
ERYTHROCYTE [DISTWIDTH] IN BLOOD BY AUTOMATED COUNT: 13.7 % (ref 11.6–15.1)
GFR SERPL CREATININE-BSD FRML MDRD: 26 ML/MIN/1.73SQ M
GLUCOSE P FAST SERPL-MCNC: 129 MG/DL (ref 65–99)
HCT VFR BLD AUTO: 45.4 % (ref 34.8–46.1)
HGB BLD-MCNC: 14.9 G/DL (ref 11.5–15.4)
MAGNESIUM SERPL-MCNC: 2.3 MG/DL (ref 1.9–2.7)
MCH RBC QN AUTO: 28.7 PG (ref 26.8–34.3)
MCHC RBC AUTO-ENTMCNC: 32.8 G/DL (ref 31.4–37.4)
MCV RBC AUTO: 88 FL (ref 82–98)
PHOSPHATE SERPL-MCNC: 3.6 MG/DL (ref 2.3–4.1)
PLATELET # BLD AUTO: 178 THOUSANDS/UL (ref 149–390)
PMV BLD AUTO: 10.8 FL (ref 8.9–12.7)
POTASSIUM SERPL-SCNC: 4.2 MMOL/L (ref 3.5–5.3)
PROT SERPL-MCNC: 6.9 G/DL (ref 6.4–8.4)
RBC # BLD AUTO: 5.19 MILLION/UL (ref 3.81–5.12)
SODIUM SERPL-SCNC: 139 MMOL/L (ref 135–147)
T4 FREE SERPL-MCNC: 0.58 NG/DL (ref 0.61–1.12)
TSH SERPL DL<=0.05 MIU/L-ACNC: 26.77 UIU/ML (ref 0.45–4.5)
URATE SERPL-MCNC: 5.1 MG/DL (ref 2–7.5)
WBC # BLD AUTO: 7.44 THOUSAND/UL (ref 4.31–10.16)

## 2024-04-26 PROCEDURE — 84100 ASSAY OF PHOSPHORUS: CPT

## 2024-04-26 PROCEDURE — 36415 COLL VENOUS BLD VENIPUNCTURE: CPT

## 2024-04-26 PROCEDURE — 80053 COMPREHEN METABOLIC PANEL: CPT

## 2024-04-26 PROCEDURE — 83036 HEMOGLOBIN GLYCOSYLATED A1C: CPT

## 2024-04-26 PROCEDURE — 84443 ASSAY THYROID STIM HORMONE: CPT

## 2024-04-26 PROCEDURE — 84550 ASSAY OF BLOOD/URIC ACID: CPT

## 2024-04-26 PROCEDURE — 83735 ASSAY OF MAGNESIUM: CPT

## 2024-04-26 PROCEDURE — 84439 ASSAY OF FREE THYROXINE: CPT

## 2024-04-26 PROCEDURE — 85027 COMPLETE CBC AUTOMATED: CPT

## 2024-04-27 LAB
EST. AVERAGE GLUCOSE BLD GHB EST-MCNC: 255 MG/DL
HBA1C MFR BLD: 10.5 %

## 2024-04-30 ENCOUNTER — OFFICE VISIT (OUTPATIENT)
Dept: PODIATRY | Facility: CLINIC | Age: 89
End: 2024-04-30
Payer: MEDICARE

## 2024-04-30 VITALS
WEIGHT: 164 LBS | HEART RATE: 89 BPM | SYSTOLIC BLOOD PRESSURE: 145 MMHG | BODY MASS INDEX: 30.96 KG/M2 | RESPIRATION RATE: 18 BRPM | HEIGHT: 61 IN | DIASTOLIC BLOOD PRESSURE: 75 MMHG

## 2024-04-30 DIAGNOSIS — E11.9 CONTROLLED TYPE 2 DIABETES MELLITUS WITHOUT COMPLICATION, WITHOUT LONG-TERM CURRENT USE OF INSULIN (HCC): Primary | ICD-10-CM

## 2024-04-30 DIAGNOSIS — I73.9 PERIPHERAL VASCULAR DISEASE, UNSPECIFIED (HCC): ICD-10-CM

## 2024-04-30 PROCEDURE — 99213 OFFICE O/P EST LOW 20 MIN: CPT | Performed by: PODIATRIST

## 2024-04-30 NOTE — PROGRESS NOTES
"Assessment/Plan:    Discussed principles of diabetic footcare.  There are no pedal pulses and sensorium is intact.  Patient relates a feeling of numbness in her toes consistent with neuropathy.  Her blood sugar is very high but tolerated as she is 89.  She knows to refrain from walking barefoot.    Trimmed multiple elongated toenails.  Reappoint 3 months.          No problem-specific Assessment & Plan notes found for this encounter.       Diagnoses and all orders for this visit:    Controlled type 2 diabetes mellitus without complication, without long-term current use of insulin (HCC)    Peripheral vascular disease, unspecified (HCC)          Subjective:      Patient ID: Leigh Ann Moreno is a 89 y.o. female.    HPI    Patient, a 89-year-old type II diabetic female presents for her yearly diabetic foot exam.  Patient notes numbness in her toes and that they stick together.  All toenails are elongated.    I personally reviewed an A1c dated 4/26/2024.  It was 10.5.    I personally reviewed an A1c dated 3/4/2024.  It was 10.4.    The following portions of the patient's history were reviewed and updated as appropriate: allergies, current medications, past family history, past medical history, past social history, past surgical history, and problem list.    Review of Systems   Respiratory:  Positive for shortness of breath.    Neurological:  Positive for weakness and numbness.   Psychiatric/Behavioral: Negative.               Objective:      /75   Pulse 89   Resp 18   Ht 5' 1\" (1.549 m)   Wt 74.4 kg (164 lb)   BMI 30.99 kg/m²          Physical Exam  Cardiovascular:      Pulses: no weak pulses.           Dorsalis pedis pulses are 0 on the right side and 0 on the left side.        Posterior tibial pulses are 0 on the right side and 0 on the left side.   Feet:      Right foot:      Skin integrity: No ulcer, skin breakdown, erythema, warmth, callus or dry skin.      Left foot:      Skin integrity: No ulcer, skin " breakdown, erythema, warmth, callus or dry skin.           Diabetic Foot Exam    Patient's shoes and socks removed.    Right Foot/Ankle   Right Foot Inspection  Skin Exam: skin normal and skin intact. No dry skin, no warmth, no callus, no erythema, no maceration, no abnormal color, no pre-ulcer, no ulcer and no callus.     Toe Exam: ROM and strength within normal limits.     Sensory   Vibration: intact  Proprioception: intact  Monofilament testing: intact    Vascular  Capillary refills: < 3 seconds  The right DP pulse is 0. The right PT pulse is 0.     Right Toe  - Comprehensive Exam  Ecchymosis: none  Arch: normal  Hammertoes: absent  Claw Toes: absent  Swelling: none   Tenderness: none       Left Foot/Ankle  Left Foot Inspection  Skin Exam: skin normal and skin intact. No dry skin, no warmth, no erythema, no maceration, normal color, no pre-ulcer, no ulcer and no callus.     Toe Exam: ROM and strength within normal limits.     Sensory   Vibration: intact  Proprioception: intact  Monofilament testing: intact    Vascular  Capillary refills: < 3 seconds  The left DP pulse is 0. The left PT pulse is 0.     Left Toe  - Comprehensive Exam  Ecchymosis: none  Arch: normal  Hammertoes: absent  Claw toes: absent  Swelling: none   Tenderness: none       Assign Risk Category  No deformity present  No loss of protective sensation  No weak pulses  Risk: 0

## 2024-05-31 ENCOUNTER — APPOINTMENT (EMERGENCY)
Dept: RADIOLOGY | Facility: HOSPITAL | Age: 88
End: 2024-05-31
Payer: MEDICARE

## 2024-05-31 ENCOUNTER — HOSPITAL ENCOUNTER (EMERGENCY)
Facility: HOSPITAL | Age: 88
Discharge: HOME | End: 2024-06-01
Attending: EMERGENCY MEDICINE | Admitting: EMERGENCY MEDICINE
Payer: MEDICARE

## 2024-05-31 DIAGNOSIS — M25.512 ACUTE PAIN OF LEFT SHOULDER: Primary | ICD-10-CM

## 2024-05-31 LAB
ALBUMIN SERPL-MCNC: 4 G/DL (ref 3.5–5.7)
ALP SERPL-CCNC: 70 IU/L (ref 34–125)
ALT SERPL-CCNC: 9 IU/L (ref 7–52)
ANION GAP SERPL CALC-SCNC: 9 MEQ/L (ref 3–15)
AST SERPL-CCNC: 16 IU/L (ref 13–39)
BASOPHILS # BLD: 0.08 K/UL (ref 0.01–0.1)
BASOPHILS NFR BLD: 0.8 %
BILIRUB SERPL-MCNC: 0.5 MG/DL (ref 0.3–1.2)
BUN SERPL-MCNC: 34 MG/DL (ref 7–25)
CALCIUM SERPL-MCNC: 9.5 MG/DL (ref 8.6–10.3)
CHLORIDE SERPL-SCNC: 104 MEQ/L (ref 98–107)
CO2 SERPL-SCNC: 25 MEQ/L (ref 21–31)
CREAT SERPL-MCNC: 2 MG/DL (ref 0.6–1.2)
DIFFERENTIAL METHOD BLD: ABNORMAL
EGFRCR SERPLBLD CKD-EPI 2021: 22.6 ML/MIN/1.73M*2
EOSINOPHIL # BLD: 0.44 K/UL (ref 0.04–0.36)
EOSINOPHIL NFR BLD: 4.5 %
ERYTHROCYTE [DISTWIDTH] IN BLOOD BY AUTOMATED COUNT: 14.1 % (ref 11.7–14.4)
GLUCOSE SERPL-MCNC: 175 MG/DL (ref 70–99)
HCT VFR BLD AUTO: 45.5 % (ref 35–45)
HGB BLD-MCNC: 14.9 G/DL (ref 11.8–15.7)
IMM GRANULOCYTES # BLD AUTO: 0.03 K/UL (ref 0–0.08)
IMM GRANULOCYTES NFR BLD AUTO: 0.3 %
LYMPHOCYTES # BLD: 2.12 K/UL (ref 1.2–3.5)
LYMPHOCYTES NFR BLD: 21.6 %
MCH RBC QN AUTO: 28.5 PG (ref 28–33.2)
MCHC RBC AUTO-ENTMCNC: 32.7 G/DL (ref 32.2–35.5)
MCV RBC AUTO: 87 FL (ref 83–98)
MONOCYTES # BLD: 0.62 K/UL (ref 0.28–0.8)
MONOCYTES NFR BLD: 6.3 %
NEUTROPHILS # BLD: 6.54 K/UL (ref 1.7–7)
NEUTS SEG NFR BLD: 66.5 %
NRBC BLD-RTO: 0 %
PDW BLD AUTO: 10.2 FL (ref 9.4–12.3)
PLATELET # BLD AUTO: 179 K/UL (ref 150–369)
POTASSIUM SERPL-SCNC: 3.9 MEQ/L (ref 3.5–5.1)
PROT SERPL-MCNC: 7.7 G/DL (ref 6–8.2)
RBC # BLD AUTO: 5.23 M/UL (ref 3.93–5.22)
SODIUM SERPL-SCNC: 138 MEQ/L (ref 136–145)
TROPONIN I SERPL HS-MCNC: 6.6 PG/ML
TROPONIN I SERPL HS-MCNC: 8.2 PG/ML
WBC # BLD AUTO: 9.83 K/UL (ref 3.8–10.5)

## 2024-05-31 PROCEDURE — 84484 ASSAY OF TROPONIN QUANT: CPT

## 2024-05-31 PROCEDURE — 80053 COMPREHEN METABOLIC PANEL: CPT | Performed by: EMERGENCY MEDICINE

## 2024-05-31 PROCEDURE — 93005 ELECTROCARDIOGRAM TRACING: CPT | Performed by: EMERGENCY MEDICINE

## 2024-05-31 PROCEDURE — 96375 TX/PRO/DX INJ NEW DRUG ADDON: CPT

## 2024-05-31 PROCEDURE — 3E033NZ INTRODUCTION OF ANALGESICS, HYPNOTICS, SEDATIVES INTO PERIPHERAL VEIN, PERCUTANEOUS APPROACH: ICD-10-PCS | Performed by: EMERGENCY MEDICINE

## 2024-05-31 PROCEDURE — 63600000 HC DRUGS/DETAIL CODE: Mod: JZ,JG

## 2024-05-31 PROCEDURE — 63600000 HC DRUGS/DETAIL CODE: Mod: JZ,JG | Performed by: EMERGENCY MEDICINE

## 2024-05-31 PROCEDURE — 85025 COMPLETE CBC W/AUTO DIFF WBC: CPT | Performed by: EMERGENCY MEDICINE

## 2024-05-31 PROCEDURE — 71045 X-RAY EXAM CHEST 1 VIEW: CPT

## 2024-05-31 PROCEDURE — 93005 ELECTROCARDIOGRAM TRACING: CPT

## 2024-05-31 PROCEDURE — 93971 EXTREMITY STUDY: CPT | Mod: LT

## 2024-05-31 PROCEDURE — 73030 X-RAY EXAM OF SHOULDER: CPT | Mod: LT

## 2024-05-31 PROCEDURE — 73060 X-RAY EXAM OF HUMERUS: CPT | Mod: LT

## 2024-05-31 PROCEDURE — 36415 COLL VENOUS BLD VENIPUNCTURE: CPT | Performed by: EMERGENCY MEDICINE

## 2024-05-31 PROCEDURE — 96374 THER/PROPH/DIAG INJ IV PUSH: CPT

## 2024-05-31 PROCEDURE — 84484 ASSAY OF TROPONIN QUANT: CPT | Mod: 91

## 2024-05-31 PROCEDURE — 99284 EMERGENCY DEPT VISIT MOD MDM: CPT | Mod: 25

## 2024-05-31 RX ORDER — NAPROXEN SODIUM 220 MG/1
81 TABLET, FILM COATED ORAL DAILY
COMMUNITY

## 2024-05-31 RX ORDER — ATORVASTATIN CALCIUM 40 MG/1
40 TABLET, FILM COATED ORAL DAILY
COMMUNITY

## 2024-05-31 RX ORDER — LEVOTHYROXINE SODIUM 100 UG/1
100 TABLET ORAL
COMMUNITY

## 2024-05-31 RX ORDER — VALSARTAN 40 MG/1
320 TABLET ORAL DAILY
COMMUNITY

## 2024-05-31 RX ORDER — HYDROMORPHONE HYDROCHLORIDE 1 MG/ML
0.5 INJECTION, SOLUTION INTRAMUSCULAR; INTRAVENOUS; SUBCUTANEOUS ONCE
Status: DISCONTINUED | OUTPATIENT
Start: 2024-05-31 | End: 2024-05-31

## 2024-05-31 RX ORDER — INSULIN GLARGINE 100 [IU]/ML
15 INJECTION, SOLUTION SUBCUTANEOUS NIGHTLY
COMMUNITY

## 2024-05-31 RX ORDER — OXYBUTYNIN CHLORIDE 5 MG/1
10 TABLET ORAL
COMMUNITY

## 2024-05-31 RX ORDER — MORPHINE SULFATE 2 MG/ML
1 INJECTION, SOLUTION INTRAMUSCULAR; INTRAVENOUS ONCE
Status: COMPLETED | OUTPATIENT
Start: 2024-05-31 | End: 2024-05-31

## 2024-05-31 RX ORDER — FLUTICASONE PROPIONATE 50 MCG
1 SPRAY, SUSPENSION (ML) NASAL DAILY
COMMUNITY

## 2024-05-31 RX ADMIN — MORPHINE SULFATE 1 MG: 2 INJECTION, SOLUTION INTRAMUSCULAR; INTRAVENOUS at 20:49

## 2024-05-31 RX ADMIN — MORPHINE SULFATE 1 MG: 2 INJECTION, SOLUTION INTRAMUSCULAR; INTRAVENOUS at 21:54

## 2024-05-31 ASSESSMENT — ENCOUNTER SYMPTOMS
ARTHRALGIAS: 1
BACK PAIN: 0
RESPIRATORY NEGATIVE: 1
MYALGIAS: 1
NECK PAIN: 0
JOINT SWELLING: 0
CONSTITUTIONAL NEGATIVE: 1
NECK STIFFNESS: 0
CARDIOVASCULAR NEGATIVE: 1

## 2024-06-01 VITALS
HEART RATE: 92 BPM | HEIGHT: 62 IN | TEMPERATURE: 98.8 F | OXYGEN SATURATION: 99 % | DIASTOLIC BLOOD PRESSURE: 91 MMHG | SYSTOLIC BLOOD PRESSURE: 177 MMHG | RESPIRATION RATE: 18 BRPM | WEIGHT: 160 LBS | BODY MASS INDEX: 29.44 KG/M2

## 2024-06-01 LAB
ATRIAL RATE: 98
P AXIS: 60
PR INTERVAL: 198
QRS DURATION: 140
QT INTERVAL: 400
QTC CALCULATION(BAZETT): 510
R AXIS: -84
T WAVE AXIS: 38
VENTRICULAR RATE: 98

## 2024-06-01 PROCEDURE — 63700000 HC SELF-ADMINISTRABLE DRUG

## 2024-06-01 RX ORDER — OXYCODONE HYDROCHLORIDE 5 MG/1
2.5 TABLET ORAL ONCE
Status: COMPLETED | OUTPATIENT
Start: 2024-06-01 | End: 2024-06-01

## 2024-06-01 RX ORDER — OXYCODONE HYDROCHLORIDE 5 MG/1
2.5 TABLET ORAL EVERY 4 HOURS PRN
Qty: 3 TABLET | Refills: 0 | Status: SHIPPED | OUTPATIENT
Start: 2024-06-01

## 2024-06-01 RX ADMIN — OXYCODONE HYDROCHLORIDE 2.5 MG: 5 TABLET ORAL at 00:35

## 2024-06-01 NOTE — ED PROVIDER NOTES
Emergency Medicine Note  HPI   HISTORY OF PRESENT ILLNESS     89-year-old female with past history of hypertension, hyperlipidemia, diabetes presents for evaluation of shoulder pain.  Patient describes progressively worsening atraumatic left sided shoulder pain.  Started this morning.  Denies any trauma or injury to it.  Has had difficulty moving it due to the pain.  Denies chest pain, shortness of breath, nausea, diaphoresis.      Shoulder Pain  Associated symptoms: myalgias    Associated symptoms: no chest pain          Patient History   PAST HISTORY     Reviewed from Nursing Triage:       No past medical history on file.    No past surgical history on file.    No family history on file.           Review of Systems   REVIEW OF SYSTEMS     Review of Systems   Constitutional: Negative.    Respiratory: Negative.     Cardiovascular: Negative.  Negative for chest pain.   Musculoskeletal:  Positive for arthralgias and myalgias. Negative for back pain, gait problem, joint swelling, neck pain and neck stiffness.   Skin: Negative.          VITALS     ED Vitals      Date/Time Temp Pulse Resp BP SpO2 [facility]   06/01/24 0100 -- 90 20 -- 98 % SM   06/01/24 0015 -- 88 20 -- 97 % SM   06/01/24 0002 -- 92 20 177/91 100 % SM   05/31/24 2300 -- 94 20 -- 97 % CEF   05/31/24 2115 -- 92 20 -- 98 % SM   05/31/24 2101 -- 96 20 176/75 97 %    05/31/24 1944 37.1 °C (98.8 °F) 100 18 210/102 98 % EH          Pulse Ox %: 98 % (05/31/24 2024)  Pulse Ox Interpretation: Normal (05/31/24 2024)  Heart Rate: 100 (05/31/24 2024)  Rhythm Strip Interpretation: Normal Sinus Rhythm (05/31/24 2024)     Physical Exam   PHYSICAL EXAM     Physical Exam  Vitals and nursing note reviewed.   Constitutional:       General: She is not in acute distress.     Appearance: She is not ill-appearing or toxic-appearing.   Cardiovascular:      Rate and Rhythm: Normal rate and regular rhythm.      Pulses: Normal pulses.      Heart sounds: Normal heart sounds. No murmur  heard.     No friction rub. No gallop.   Pulmonary:      Effort: Pulmonary effort is normal.      Breath sounds: Normal breath sounds.   Abdominal:      General: Abdomen is flat.      Palpations: Abdomen is soft.   Musculoskeletal:      Right shoulder: Normal.      Left shoulder: Tenderness present. No swelling, deformity, effusion, laceration, bony tenderness or crepitus. Decreased range of motion. Normal strength. Normal pulse.      Comments: Mild decreased range of motion to the left shoulder.  Diffuse tenderness throughout.  No obvious deformity or bony tenderness.  Sensation intact.  Radial pulse 2+ bilaterally.   Skin:     General: Skin is warm and dry.      Capillary Refill: Capillary refill takes less than 2 seconds.   Neurological:      General: No focal deficit present.      Mental Status: She is alert and oriented to person, place, and time.           PROCEDURES     Procedures     DATA     Results       Procedure Component Value Units Date/Time    HS Troponin (with 2 hour reflex) [[id number]]  (Normal) Collected: 05/31/24 2052    Specimen: Blood, Venous Updated: 05/31/24 2156     High Sens Troponin I 6.6 pg/mL     Comprehensive metabolic panel [[id number]]  (Abnormal) Collected: 05/31/24 2007    Specimen: Blood, Venous Updated: 05/31/24 2101     Sodium 138 mEQ/L      Potassium 3.9 mEQ/L      Comment: Results obtained on plasma. Plasma Potassium values may be up to 0.4 mEQ/L less than serum values. The differences may be greater for patients with high platelet or white cell counts.        Chloride 104 mEQ/L      CO2 25 mEQ/L      BUN 34 mg/dL      Creatinine 2.0 mg/dL      Glucose 175 mg/dL      Calcium 9.5 mg/dL      AST (SGOT) 16 IU/L      ALT (SGPT) 9 IU/L      Alkaline Phosphatase 70 IU/L      Total Protein 7.7 g/dL      Comment: Test performed on plasma which typically contains approximately 0.4 g/dL more protein than serum.        Albumin 4.0 g/dL      Bilirubin, Total 0.5 mg/dL      eGFR 22.6  mL/min/1.73m*2      Comment: Calculation based on the Chronic Kidney Disease Epidemiology Collaboration (CKD-EPI) equation refit without adjustment for race.        Anion Gap 9 mEQ/L     CBC and differential [[id number]]  (Abnormal) Collected: 05/31/24 2007    Specimen: Blood, Venous Updated: 05/31/24 2023     WBC 9.83 K/uL      RBC 5.23 M/uL      Hemoglobin 14.9 g/dL      Hematocrit 45.5 %      MCV 87.0 fL      MCH 28.5 pg      MCHC 32.7 g/dL      RDW 14.1 %      Platelets 179 K/uL      MPV 10.2 fL      Differential Type Auto     nRBC 0.0 %      Immature Granulocytes 0.3 %      Neutrophils 66.5 %      Lymphocytes 21.6 %      Monocytes 6.3 %      Eosinophils 4.5 %      Basophils 0.8 %      Immature Granulocytes, Absolute 0.03 K/uL      Neutrophils, Absolute 6.54 K/uL      Lymphocytes, Absolute 2.12 K/uL      Monocytes, Absolute 0.62 K/uL      Eosinophils, Absolute 0.44 K/uL      Basophils, Absolute 0.08 K/uL     Extra Tubes [[id number]] Collected: 05/31/24 2007    Specimen: Blood, Venous Updated: 05/31/24 2019    Narrative:      The following orders were created for panel order Extra Tubes.  Procedure                               Abnormality         Status                     ---------                               -----------         ------                     Extra Tube Lt Green[[id number]]                              In process                   Please view results for these tests on the individual orders.    Extra Tube Lt Green [[id number]] Collected: 05/31/24 2007    Specimen: Blood, Venous Updated: 05/31/24 2019            Imaging Results              US venous arm, MILTON extremity (Final result)  Result time 05/31/24 21:24:59      Final result                   Impression:    IMPRESSION: No evidence of deep venous thrombosis.    COMMENT: Duplex and color Doppler ultrasonography of the left upper extremity  veins was performed from the internal jugular vein.  Basilic and cephalic veins.  The veins are normally  compressible. Normal color Doppler flow and pulsed  Doppler wave forms are obtained on spectral analysis.               Narrative:    CLINICAL HISTORY: Left arm swelling and redness.                                       X-RAY SHOULDER LEFT 2+ VIEWS (In process)                      X-RAY HUMERUS LEFT (In process)                      X-RAY CHEST 1 VIEW (In process)                     ECG 12 lead          Scoring tools                                  ED Course & MDM   MDM / ED COURSE / CLINICAL IMPRESSION / DISPO     Medical Decision Making  Problems Addressed:  Acute pain of left shoulder: acute illness or injury    Amount and/or Complexity of Data Reviewed  Labs: ordered. Decision-making details documented in ED Course.  Radiology: ordered. Decision-making details documented in ED Course.  ECG/medicine tests: ordered.    Risk  Prescription drug management.        ED Course as of 06/01/24 0147   Fri May 31, 2024   2025 CBC and differential(!)  No leukocytosis, hemoglobin WNL [WL]   2104 Comprehensive metabolic panel(!)  Upon review of care everywhere, kidney function consistent with baseline [WL]   2159 US venous arm, MILTON extremity  IMPRESSION: No evidence of deep venous thrombosis [WL]   2349 High Sens Troponin I: 8.2 [WL]   Sat Jun 01, 2024   0011 Patient reassessed and informed of labs.  Notes persistent diffuse pain to the left upper extremity.  States morphine has not helped.  Patient is not agreeable to staying for PT/OT/case management eval.  Requesting stronger pain medications.  Will trial 2.5 mg oxycodone and reassess. [WL]   0143 Patient pain control is controlled after oxycodone.  She is comfortable with discharge home at this time.  A few doses of 2.5 oxycodone was sent to pharmacy patient was instructed to take for severe, breakthrough pain only.  Educated on narcotic use.  Return precautions given.  Encouraged to follow-up with orthopedic doctor for further evaluation and workup.  Return precautions  given.  Patient understands and is agreeable.  Questions and concerns answered and discussed. [WL]   0147 Patient requesting disc with x-rays prior to discharge.  Radiology to provide. [WL]      ED Course User Index  [WL] Jennifer Mederos PA C     Clinical Impression      Acute pain of left shoulder     _________________       ED Disposition   Discharge                       Jennifer Mederos PA C  06/01/24 0147

## 2024-06-01 NOTE — ED ATTESTATION NOTE
I have personally seen and examined the patient.  I reviewed and agree with physician assistant / nurse practitioner’s assessment and plan of care.     Exam: Patient is uncomfortable but relatively stable in no acute distress.  She is hypertensive but the remainder of her vital signs are normal and she is afebrile.  Examination of the left upper extremity revealed swelling when compared to the right.  Although the patient complained of proximal upper extremity pain the exam seem to elicit more discomfort in the forearm.  There is a fullness in the proximal forearm that was tender to palpation.  There is also a ganglion cyst in the volar forearm as well but this is not the area of her pain.  The extremity is otherwise neurovascularly intact.    Plan: Concern for left upper extremity pain will consider musculoskeletal as well as vascular pathology.  Will also consider coronary artery disease with atypical presentation.  Will check labs, EKG and x-rays of the chest and upper extremity.  Will also ultrasound the left upper extremity rule out DVT.  Will treat the patient's pain           Pancho Horn DO  05/31/24 2036

## 2024-06-01 NOTE — DISCHARGE INSTRUCTIONS
Continue to rest and avoid strenuous activity or heavy lifting.  Take over-the-counter Tylenol per label as needed for pain.  Take oxycodone as prescribed for severe, breakthrough pain only.  Follow-up with orthopedic doctor for further evaluation and workup.  Return to ED with worsening symptoms including loss of sensation, fevers, redness, swelling.    You were prescribed pain medications.  Please do not consume alcohol, work, drive or operate heavy machinery while under the influence of pain medication.  Pain medication may cause drowsiness, nausea, and constipation.

## 2024-06-03 ENCOUNTER — OFFICE VISIT (OUTPATIENT)
Dept: OBGYN CLINIC | Facility: OTHER | Age: 89
End: 2024-06-03
Payer: MEDICARE

## 2024-06-03 VITALS
HEIGHT: 61 IN | HEART RATE: 90 BPM | WEIGHT: 161 LBS | SYSTOLIC BLOOD PRESSURE: 112 MMHG | DIASTOLIC BLOOD PRESSURE: 69 MMHG | BODY MASS INDEX: 30.4 KG/M2

## 2024-06-03 DIAGNOSIS — G90.512 COMPLEX REGIONAL PAIN SYNDROME TYPE 1 OF LEFT UPPER EXTREMITY: ICD-10-CM

## 2024-06-03 DIAGNOSIS — M75.02 ADHESIVE CAPSULITIS OF LEFT SHOULDER: ICD-10-CM

## 2024-06-03 DIAGNOSIS — M25.512 LEFT SHOULDER PAIN, UNSPECIFIED CHRONICITY: Primary | ICD-10-CM

## 2024-06-03 PROCEDURE — 99203 OFFICE O/P NEW LOW 30 MIN: CPT | Performed by: ORTHOPAEDIC SURGERY

## 2024-06-03 RX ORDER — OXYCODONE HYDROCHLORIDE 5 MG/1
2.5 TABLET ORAL EVERY 4 HOURS PRN
COMMUNITY
Start: 2024-06-01

## 2024-06-03 RX ORDER — DULAGLUTIDE 0.75 MG/.5ML
INJECTION, SOLUTION SUBCUTANEOUS
COMMUNITY
Start: 2024-04-19

## 2024-06-03 RX ORDER — FLUTICASONE FUROATE, UMECLIDINIUM BROMIDE AND VILANTEROL TRIFENATATE 100; 62.5; 25 UG/1; UG/1; UG/1
POWDER RESPIRATORY (INHALATION)
COMMUNITY
Start: 2024-04-19

## 2024-06-03 RX ORDER — LIDOCAINE 50 MG/G
OINTMENT TOPICAL 3 TIMES DAILY PRN
Qty: 50 G | Refills: 2 | Status: SHIPPED | OUTPATIENT
Start: 2024-06-03
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
## 2024-06-04 ENCOUNTER — TELEPHONE (OUTPATIENT)
Age: 89
End: 2024-06-04

## 2024-06-04 NOTE — TELEPHONE ENCOUNTER
Caller: Elma from [facility]    Doctor: Dr. Forrest    Reason for call: Elma calling stating that the Xylocaine ointment does require prior authorization.    Key Code from Cover My Meds: [id number]    Call back#:

## 2024-06-05 NOTE — TELEPHONE ENCOUNTER
PA for LIDO 5% OINT Approved     Date(s) approved UNTIL 09/03/2024      Patient advised by          [] MyChart Message  [x] Phone call   []LMOM  []L/M to call office as no active Communication consent on file  []Unable to leave detailed message as VM not approved on Communication consent       Pharmacy advised by    []Fax  []Phone call    Approval letter scanned into Media Yes

## 2024-06-05 NOTE — TELEPHONE ENCOUNTER
PA for LIDO 5% OINT    Submitted via    [x]CMM-KEY [id number]  []SurescriLETSGROOP-Case ID #   []Faxed to plan   []Other website   []Phone call Case ID #     Office notes sent, clinical questions answered. Awaiting determination    Turnaround time for your insurance to make a decision on your Prior Authorization can take 7-21 business days.

## 2024-06-18 ENCOUNTER — TELEPHONE (OUTPATIENT)
Age: 89
End: 2024-06-18

## 2024-06-18 NOTE — TELEPHONE ENCOUNTER
Patients daughter called in to make an appointment. I advised her that we need a referral before we can schedule. Daughter said she will have patients PCP send us one.

## 2024-06-20 NOTE — TELEPHONE ENCOUNTER
Anette pt daughter called in to schedule rheum consult . Anette stated the referral would be coming in the mail . I advise Anette to have the PCP fax over the referral . Fax # was provided

## 2024-06-27 ENCOUNTER — APPOINTMENT (OUTPATIENT)
Dept: LAB | Facility: CLINIC | Age: 89
End: 2024-06-27
Payer: MEDICARE

## 2024-06-27 ENCOUNTER — CONSULT (OUTPATIENT)
Dept: RHEUMATOLOGY | Facility: CLINIC | Age: 89
End: 2024-06-27
Payer: MEDICARE

## 2024-06-27 VITALS
SYSTOLIC BLOOD PRESSURE: 132 MMHG | BODY MASS INDEX: 30.42 KG/M2 | HEIGHT: 61 IN | DIASTOLIC BLOOD PRESSURE: 74 MMHG | HEART RATE: 115 BPM | OXYGEN SATURATION: 98 %

## 2024-06-27 DIAGNOSIS — M35.3 PMR (POLYMYALGIA RHEUMATICA) (HCC): ICD-10-CM

## 2024-06-27 DIAGNOSIS — M35.3 PMR (POLYMYALGIA RHEUMATICA) (HCC): Primary | ICD-10-CM

## 2024-06-27 LAB
CRP SERPL QL: 6.1 MG/L
ERYTHROCYTE [SEDIMENTATION RATE] IN BLOOD: 32 MM/HOUR (ref 0–29)
HCV AB SER QL: NORMAL

## 2024-06-27 PROCEDURE — 86430 RHEUMATOID FACTOR TEST QUAL: CPT

## 2024-06-27 PROCEDURE — 85652 RBC SED RATE AUTOMATED: CPT

## 2024-06-27 PROCEDURE — 86140 C-REACTIVE PROTEIN: CPT

## 2024-06-27 PROCEDURE — 36415 COLL VENOUS BLD VENIPUNCTURE: CPT

## 2024-06-27 PROCEDURE — 86803 HEPATITIS C AB TEST: CPT

## 2024-06-27 PROCEDURE — 99204 OFFICE O/P NEW MOD 45 MIN: CPT | Performed by: INTERNAL MEDICINE

## 2024-06-27 PROCEDURE — 86200 CCP ANTIBODY: CPT

## 2024-06-27 RX ORDER — PREDNISONE 5 MG/1
10 TABLET ORAL DAILY
Qty: 30 TABLET | Refills: 11 | Status: SHIPPED | OUTPATIENT
Start: 2024-06-27

## 2024-06-27 NOTE — PROGRESS NOTES
"  This is a Rheumatology Consult seen at the request of Dr. Holguin      HPI: Leigh Ann Moreno is a 89 y/o female who presents for further evaluation arthralgias and myalgias. She has past medical history diabetes mellitus, HTN, HLD, neuropathy, asthma gout    Onset of symptoms \"few weeks ago:    B/l shoulder pain left > right    Also with pain and stiffness b/l wrists and small joints hands    Denies headaches, jaw claudication, double or blurry vision    No rashes, Raynaud's or sicca      --------------------------------------------------------------------------------------------------------        ROS:      - for: Fevers, Chills or sweats.  No HAs or scalp tenderness.  No jaw claudication.  No acute visual or eye changes.  No dry eyes.  No auditory complaints.  No oral lesions or ulcers.  No dry mouth.  No sore throat or cough.  No chest pains or palpitations.  No shortness of breath, dyspnea on exertion or wheezing.  No hemotpysis.  No abdominal pain, GERD symptoms, diarrhea or constipation.  No urinary complaints.  No numbness, tingling or weakness.  No rashes.    All other ROS was reviewed and negative except as above         --------------------------------------------------------------------------------------------------------    Past Medical History    Past Medical History:   Diagnosis Date    Arthritis     Asthma     COPD (chronic obstructive pulmonary disease) ([facility])     Diabetes mellitus ([facility])     Difficulty walking     Gout     Hyperlipidemia     Hypertension     Hypothyroidism     Neuropathy in diabetes ([facility])     Overactive bladder     PVD (peripheral vascular disease) ([facility])     Verruca            Past Surgical History    Past Surgical History:   Procedure Laterality Date    ARTERIAL BYPASS SURGERY      CAROTID ENDARTERECTOMY Right     CATARACT EXTRACTION      HYSTERECTOMY      NEPHRECTOMY      in her 20s           Family History    Family History   Problem Relation Age of Onset    Asthma Mother     " Asthma Father     Diabetes Father     Arthritis Father     Hypertension Father     Lung cancer Sister     Early death Brother 36    COPD Daughter     Asthma Daughter     Rheum arthritis Daughter     Cataracts Daughter     Liver cancer Son     Alzheimer's disease Sister     Rectal cancer Sister     Thyroid cancer Sister     No Known Problems Sister     Asthma Son             Social History    Social History     Tobacco Use    Smoking status: Former     Current packs/day: 0.00     Average packs/day: 1 pack/day for 25.0 years (25.0 ttl pk-yrs)     Types: Cigarettes     Start date:      Quit date:      Years since quittin.5    Smokeless tobacco: Former   Vaping Use    Vaping status: Never Used   Substance Use Topics    Alcohol use: Not Currently     Alcohol/week: 1.0 standard drink of alcohol     Types: 1 Glasses of wine per week    Drug use: Never         Allergies    Allergies   Allergen Reactions    Penicillins Hives    Ace Inhibitors      Other reaction(s): cough    Pollen Extract Sneezing     sneezing    Sulfa Antibiotics      Unknown reaction         Medications    Current Outpatient Medications   Medication Instructions    albuterol (ACCUNEB) 1.25 mg, Nebulization, 2 times daily    albuterol (PROVENTIL HFA,VENTOLIN HFA) 90 mcg/act inhaler 2 puffs, Inhalation, Every 4 hours PRN    aspirin (ECOTRIN LOW STRENGTH) 81 mg, Oral, Daily    atorvastatin (LIPITOR) 40 mg, Oral, Daily    BD PEN NEEDLE SKYLER U/F 32G X 4 MM MISC 1 SYRINGE BY MISCELLANEOUS ROUTE DAILY    benzonatate (TESSALON) 200 mg, Oral, 3 times daily    Calcium Carb-Cholecalciferol (CALTRATE 600+D3 SOFT PO) 2 tablets, Oral    clobetasol (TEMOVATE) 0.05 % ointment Topical, Daily at bedtime    docusate sodium (COLACE) 100 mg, Oral, Once    dulaglutide (TRULICITY) 1.5 mg, Weekly    fluticasone (FLONASE) 50 mcg/act nasal spray 1 spray, Nasal    fluticasone-umeclidinium-vilanterol (Trelegy Ellipta) 200-62.5-25 mcg/actuation AEPB inhaler 1 puff,  "Inhalation, Daily, Rinse mouth after use.    guaiFENesin (MUCINEX) 600 mg, Oral, 2 times daily    insulin glargine (LANTUS SOLOSTAR) 15 Units, Subcutaneous, Daily at bedtime    ipratropium (ATROVENT) 0.06 % nasal spray 2 sprays, Nasal, 2 times daily    Jardiance 10 mg, Oral, Daily    levalbuterol (XOPENEX) 0.63 mg, Nebulization, 2 times daily    levothyroxine 100 mcg, Oral, Daily    lidocaine (XYLOCAINE) 5 % ointment Topical, 3 times daily PRN    loratadine (CLARITIN) 10 mg, Oral, Daily    Multiple Vitamin (multivitamin) tablet 1 tablet, Oral, Daily    ONE TOUCH ULTRA TEST test strip USE FOR BLOOD GLUCOSE TESTING AS DIRECTED TWICE DAILY    ONETOUCH DELICA LANCETS 33G MISC 1 SYRINGE BY MISCELLANEOUS ROUTE 2 TIMES A DAY AS NEEDED DX E11.9 LAST O/V 10/20/18    oxybutynin (DITROPAN-XL) 10 mg, Oral, Daily    oxyCODONE (ROXICODONE) 2.5 mg, Every 4 hours PRN    pantoprazole (PROTONIX) 40 mg, Oral, Daily (early morning)    Trelegy Ellipta 100-62.5-25 MCG/ACT inhaler     Trulicity 0.75 MG/0.5ML injection     valsartan (DIOVAN) 320 MG tablet No dose, route, or frequency recorded.          Physical Exam    /74   Pulse (!) 115   Ht 5' 1\" (1.549 m)   SpO2 98%   BMI 30.42 kg/m²     GEN: AAO, No apparent distress.  Patient is well developed.  HEENT:  Pupils are equal, round and reactive.  Sclera are clear.  Fundoscopic exam is normal.  External ears are without lesions.  Oral pharynx is clear of ulcers or other lesions.  MMM.   NECK:  Supple.  There is no adenopathy appreciable in anterior or posterior cervical chains or supraclavicularly.  JVP is normal.    HEART: Regular rate and rhythm.  There is no appreciable murmur, gallop or rub.  LUNGS: Clear to auscultation.  ABD:  Soft, without tenderness, rebound or guarding.  No appreciable organomegally.  NEURO: Speech and cognition are normal.  Strength is 5/5 throughout.  Tone is normal.  DTRs are 2/4 at the knees, ankles and elbows.  Gait is normal.  SKIN: There are no " rashes or lesions    MUSCULOSKELETAL:   B/l MCPs tender      ________________________________________________________________________          Results Review    Comprehensive metabolic panel  Order: [id number]   suggestion  Information displayed in this report may not trend or trigger automated decision support.     Component  Ref Range & Units 3 wk ago   Sodium  136 - 145 mEQ/L 138   Potassium  3.5 - 5.1 mEQ/L 3.9   Comment: Results obtained on plasma. Plasma Potassium values may be up to 0.4 mEQ/L less than serum values. The differences may be greater for patients with high platelet or white cell counts.   Chloride  98 - 107 mEQ/L 104   CO2  21 - 31 mEQ/L 25   BUN  7 - 25 mg/dL 34 High    Creatinine  0.6 - 1.2 mg/dL 2.0 High    Glucose  70 - 99 mg/dL 175 High    Calcium  8.6 - 10.3 mg/dL 9.5   AST (SGOT)  13 - 39 IU/L 16   ALT (SGPT)  7 - 52 IU/L 9   Alkaline Phosphatase  34 - 125 IU/L 70   Total Protein  6.0 - 8.2 g/dL 7.7   Comment: Test performed on plasma which typically contains approximately 0.4 g/dL more protein than serum.   Albumin  3.5 - 5.7 g/dL 4.0   Bilirubin, Total  0.3 - 1.2 mg/dL 0.5   eGFR  >=60.0 mL/min/1.73m*2 22.6 Low    Comment: Calculation based on the Chronic Kidney Disease Epidemiology Collaboration (CKD-EPI) equation refit without adjustment for race.   Anion Gap  3 - 15 mEQ/L 9     Specimen Collected: 05/31/24  8:07 PM    Performed by: [facility] LAB Last Resulted: 05/31/24  9:01 PM   Received From: [facility]  Result Received: 06/03/24  9:11 AM    View Encounter        Received Information   Contains abnormal data CBC and differential  Order: [id number]   suggestion  Information displayed in this report may not trend or trigger automated decision support.     Component  Ref Range & Units 3 wk ago   WBC  3.80 - 10.50 K/uL 9.83   RBC  3.93 - 5.22 M/uL 5.23 High    Hemoglobin  11.8 - 15.7 g/dL 14.9   Hematocrit  35.0 - 45.0 % 45.5 High    MCV  83.0 - 98.0 fL 87.0   MCH  28.0 -  33.2 pg 28.5   MCHC  32.2 - 35.5 g/dL 32.7   RDW  11.7 - 14.4 % 14.1   Platelets  150 - 369 K/uL 179   MPV  9.4 - 12.3 fL 10.2   Differential Type Auto   nRBC  <=0.0 % 0.0   Immature Granulocytes  % 0.3   Neutrophils  % 66.5   Lymphocytes  % 21.6   Monocytes  % 6.3   Eosinophils  % 4.5   Basophils  % 0.8   Immature Granulocytes, Absolute  0.00 - 0.08 K/uL 0.03   Neutrophils, Absolute  1.70 - 7.00 K/uL 6.54   Lymphocytes, Absolute  1.20 - 3.50 K/uL 2.12   Monocytes, Absolute  0.28 - 0.80 K/uL 0.62   Eosinophils, Absolute  0.04 - 0.36 K/uL 0.44 High    Basophils, Absolute  0.01 - 0.10 K/uL 0.08         Impressions     PMR  Diabetes mellitus  Neuropathy  Osteoarthritis  CKD (solitary kidney)    Plans:    Leigh Ann Moreno is a 89 y/o female with clinical s/s PMR    I have reviewed PMR and provided handouts    At this time no GCA symptoms    Check serologies and inflammatory markers    Start 10 mg prednisone (she will review with PCP regarding insulin management)    RTC 3 weeks      Thank you for involving me in this patient's care.        Monty Bueno MD  [facility] Rheumatology

## 2024-06-28 LAB — RHEUMATOID FACT SER QL LA: NEGATIVE

## 2024-06-29 LAB — CCP AB SER IA-ACNC: 1.3

## 2024-07-16 ENCOUNTER — APPOINTMENT (OUTPATIENT)
Dept: LAB | Facility: CLINIC | Age: 89
End: 2024-07-16
Payer: MEDICARE

## 2024-07-16 DIAGNOSIS — Z79.4 ENCOUNTER FOR LONG-TERM (CURRENT) USE OF INSULIN (HCC): ICD-10-CM

## 2024-07-16 DIAGNOSIS — E11.65 INADEQUATELY CONTROLLED DIABETES MELLITUS (HCC): ICD-10-CM

## 2024-07-16 DIAGNOSIS — Z79.899 ENCOUNTER FOR LONG-TERM (CURRENT) USE OF OTHER MEDICATIONS: ICD-10-CM

## 2024-07-16 LAB
ALBUMIN SERPL BCG-MCNC: 3.8 G/DL (ref 3.5–5)
ALP SERPL-CCNC: 65 U/L (ref 34–104)
ALT SERPL W P-5'-P-CCNC: 12 U/L (ref 7–52)
ANION GAP SERPL CALCULATED.3IONS-SCNC: 8 MMOL/L (ref 4–13)
AST SERPL W P-5'-P-CCNC: 13 U/L (ref 13–39)
BASOPHILS # BLD AUTO: 0.06 THOUSANDS/ÂΜL (ref 0–0.1)
BASOPHILS NFR BLD AUTO: 1 % (ref 0–1)
BILIRUB SERPL-MCNC: 0.48 MG/DL (ref 0.2–1)
BUN SERPL-MCNC: 36 MG/DL (ref 5–25)
CALCIUM SERPL-MCNC: 9 MG/DL (ref 8.4–10.2)
CHLORIDE SERPL-SCNC: 105 MMOL/L (ref 96–108)
CHOLEST SERPL-MCNC: 152 MG/DL
CO2 SERPL-SCNC: 25 MMOL/L (ref 21–32)
CREAT SERPL-MCNC: 1.83 MG/DL (ref 0.6–1.3)
EOSINOPHIL # BLD AUTO: 0.19 THOUSAND/ÂΜL (ref 0–0.61)
EOSINOPHIL NFR BLD AUTO: 2 % (ref 0–6)
ERYTHROCYTE [DISTWIDTH] IN BLOOD BY AUTOMATED COUNT: 15.3 % (ref 11.6–15.1)
GFR SERPL CREATININE-BSD FRML MDRD: 23 ML/MIN/1.73SQ M
GLUCOSE P FAST SERPL-MCNC: 129 MG/DL (ref 65–99)
HCT VFR BLD AUTO: 46 % (ref 34.8–46.1)
HDLC SERPL-MCNC: 84 MG/DL
HGB BLD-MCNC: 15 G/DL (ref 11.5–15.4)
IMM GRANULOCYTES # BLD AUTO: 0.04 THOUSAND/UL (ref 0–0.2)
IMM GRANULOCYTES NFR BLD AUTO: 0 % (ref 0–2)
LDLC SERPL CALC-MCNC: 48 MG/DL (ref 0–100)
LYMPHOCYTES # BLD AUTO: 1.77 THOUSANDS/ÂΜL (ref 0.6–4.47)
LYMPHOCYTES NFR BLD AUTO: 17 % (ref 14–44)
MCH RBC QN AUTO: 28.6 PG (ref 26.8–34.3)
MCHC RBC AUTO-ENTMCNC: 32.6 G/DL (ref 31.4–37.4)
MCV RBC AUTO: 88 FL (ref 82–98)
MONOCYTES # BLD AUTO: 0.86 THOUSAND/ÂΜL (ref 0.17–1.22)
MONOCYTES NFR BLD AUTO: 8 % (ref 4–12)
NEUTROPHILS # BLD AUTO: 7.57 THOUSANDS/ÂΜL (ref 1.85–7.62)
NEUTS SEG NFR BLD AUTO: 72 % (ref 43–75)
NONHDLC SERPL-MCNC: 68 MG/DL
NRBC BLD AUTO-RTO: 0 /100 WBCS
PLATELET # BLD AUTO: 158 THOUSANDS/UL (ref 149–390)
PMV BLD AUTO: 10.6 FL (ref 8.9–12.7)
POTASSIUM SERPL-SCNC: 3.9 MMOL/L (ref 3.5–5.3)
PROT SERPL-MCNC: 7.2 G/DL (ref 6.4–8.4)
RBC # BLD AUTO: 5.25 MILLION/UL (ref 3.81–5.12)
SODIUM SERPL-SCNC: 138 MMOL/L (ref 135–147)
TRIGL SERPL-MCNC: 98 MG/DL
URATE SERPL-MCNC: 6.3 MG/DL (ref 2–7.5)
WBC # BLD AUTO: 10.49 THOUSAND/UL (ref 4.31–10.16)

## 2024-07-16 PROCEDURE — 85025 COMPLETE CBC W/AUTO DIFF WBC: CPT

## 2024-07-16 PROCEDURE — 80061 LIPID PANEL: CPT

## 2024-07-16 PROCEDURE — 84443 ASSAY THYROID STIM HORMONE: CPT

## 2024-07-16 PROCEDURE — 80053 COMPREHEN METABOLIC PANEL: CPT

## 2024-07-16 PROCEDURE — 83036 HEMOGLOBIN GLYCOSYLATED A1C: CPT

## 2024-07-16 PROCEDURE — 84550 ASSAY OF BLOOD/URIC ACID: CPT

## 2024-07-16 PROCEDURE — 36415 COLL VENOUS BLD VENIPUNCTURE: CPT

## 2024-07-17 LAB
EST. AVERAGE GLUCOSE BLD GHB EST-MCNC: 206 MG/DL
HBA1C MFR BLD: 8.8 %

## 2024-07-19 LAB — MISCELLANEOUS LAB TEST RESULT: NORMAL

## 2024-07-24 ENCOUNTER — OFFICE VISIT (OUTPATIENT)
Dept: RHEUMATOLOGY | Facility: CLINIC | Age: 89
End: 2024-07-24
Payer: MEDICARE

## 2024-07-24 VITALS
HEART RATE: 82 BPM | BODY MASS INDEX: 30.42 KG/M2 | OXYGEN SATURATION: 98 % | SYSTOLIC BLOOD PRESSURE: 118 MMHG | HEIGHT: 61 IN | DIASTOLIC BLOOD PRESSURE: 76 MMHG

## 2024-07-24 DIAGNOSIS — M35.3 PMR (POLYMYALGIA RHEUMATICA) (HCC): Primary | ICD-10-CM

## 2024-07-24 PROCEDURE — 99205 OFFICE O/P NEW HI 60 MIN: CPT | Performed by: INTERNAL MEDICINE

## 2024-07-24 RX ORDER — MULTIVIT-MIN/IRON/FOLIC ACID/K 45-800-120
1 CAPSULE ORAL DAILY
COMMUNITY

## 2024-07-24 RX ORDER — PREDNISONE 1 MG/1
4 TABLET ORAL DAILY
Qty: 120 TABLET | Refills: 6 | Status: SHIPPED | OUTPATIENT
Start: 2024-07-24

## 2024-07-24 RX ORDER — POLYETHYLENE GLYCOL 3350 17 G/17G
17 POWDER, FOR SOLUTION ORAL
COMMUNITY

## 2024-07-24 RX ORDER — DEXTROMETHORPHAN HBR. AND GUAIFENESIN 10; 100 MG/5ML; MG/5ML
5 SOLUTION ORAL
COMMUNITY

## 2024-07-24 RX ORDER — PREDNISONE 5 MG/1
5 TABLET ORAL DAILY
Qty: 30 TABLET | Refills: 11 | Status: SHIPPED | OUTPATIENT
Start: 2024-07-24

## 2024-07-24 NOTE — PROGRESS NOTES
HPI: Leigh Ann Moreno is a 89 y/o female who presents for further f/u PMR. Doing well on 10 mg prednisone. B/l shoulder pain better, pain and stiffness small joints hands and wrists improved.     She has past medical history diabetes mellitus, HTN, HLD, neuropathy, asthma gout    Denies headaches, jaw claudication, double or blurry vision    No rashes, Raynaud's or sicca      --------------------------------------------------------------------------------------------------------        ROS:      - for: Fevers, Chills or sweats.  No HAs or scalp tenderness.  No jaw claudication.  No acute visual or eye changes.  No dry eyes.  No auditory complaints.  No oral lesions or ulcers.  No dry mouth.  No sore throat or cough.  No chest pains or palpitations.  No shortness of breath, dyspnea on exertion or wheezing.  No hemotpysis.  No abdominal pain, GERD symptoms, diarrhea or constipation.  No urinary complaints.  No numbness, tingling or weakness.  No rashes.    All other ROS was reviewed and negative except as above         --------------------------------------------------------------------------------------------------------    Past Medical History    Past Medical History:   Diagnosis Date    Arthritis     Asthma     COPD (chronic obstructive pulmonary disease) ([facility])     Diabetes mellitus ([facility])     Difficulty walking     Gout     Hyperlipidemia     Hypertension     Hypothyroidism     Neuropathy in diabetes ([facility])     Overactive bladder     PVD (peripheral vascular disease) ([facility])     Verruca            Past Surgical History    Past Surgical History:   Procedure Laterality Date    ARTERIAL BYPASS SURGERY      CAROTID ENDARTERECTOMY Right     CATARACT EXTRACTION      HYSTERECTOMY      NEPHRECTOMY      in her 20s           Family History    Family History   Problem Relation Age of Onset    Asthma Mother     Asthma Father     Diabetes Father     Arthritis Father     Hypertension Father     Lung cancer Sister     Early death  Brother 36    COPD Daughter     Asthma Daughter     Rheum arthritis Daughter     Cataracts Daughter     Liver cancer Son     Alzheimer's disease Sister     Rectal cancer Sister     Thyroid cancer Sister     No Known Problems Sister     Asthma Son             Social History    Social History     Tobacco Use    Smoking status: Former     Current packs/day: 0.00     Average packs/day: 1 pack/day for 25.0 years (25.0 ttl pk-yrs)     Types: Cigarettes     Start date:      Quit date:      Years since quittin.5    Smokeless tobacco: Former   Vaping Use    Vaping status: Never Used   Substance Use Topics    Alcohol use: Not Currently    Drug use: Never         Allergies    Allergies   Allergen Reactions    Penicillins Hives    Ace Inhibitors      Other reaction(s): cough    Pollen Extract Sneezing     sneezing    Sulfa Antibiotics      Unknown reaction         Medications    Current Outpatient Medications   Medication Instructions    albuterol (ACCUNEB) 1.25 mg, Nebulization, 2 times daily    albuterol (PROVENTIL HFA,VENTOLIN HFA) 90 mcg/act inhaler 2 puffs, Inhalation, Every 4 hours PRN    aspirin (ECOTRIN LOW STRENGTH) 81 mg, Oral, Daily    atorvastatin (LIPITOR) 40 mg, Oral, Daily    BD PEN NEEDLE SKYLER U/F 32G X 4 MM MISC 1 SYRINGE BY MISCELLANEOUS ROUTE DAILY    benzonatate (TESSALON) 200 mg, Oral, 3 times daily    Calcium Carb-Cholecalciferol (CALTRATE 600+D3 SOFT PO) 2 tablets, Oral    clobetasol (TEMOVATE) 0.05 % ointment Topical, Daily at bedtime    dextromethorphan-guaiFENesin (ROBITUSSIN-DM)  mg/5 mL oral liquid 5 mL, Oral    dulaglutide (TRULICITY) 1.5 mg, Weekly    fluticasone (FLONASE) 50 mcg/act nasal spray 1 spray, Nasal    fluticasone-umeclidinium-vilanterol (Trelegy Ellipta) 200-62.5-25 mcg/actuation AEPB inhaler 1 puff, Inhalation, Daily, Rinse mouth after use.    guaiFENesin (MUCINEX) 600 mg, Oral, 2 times daily    insulin aspart (NovoLOG) 100 units/mL injection Per sliding scale     "insulin glargine (LANTUS SOLOSTAR) 15 Units, Subcutaneous, Daily at bedtime    ipratropium (ATROVENT) 0.06 % nasal spray 2 sprays, Nasal, 2 times daily    Jardiance 10 mg, Oral, Daily    levalbuterol (XOPENEX) 0.63 mg, Nebulization, 2 times daily    levothyroxine 100 mcg, Oral, Daily    lidocaine (XYLOCAINE) 5 % ointment Topical, 3 times daily PRN    loratadine (CLARITIN) 10 mg, Oral, Daily    Multiple Vitamin (multivitamin) tablet 1 tablet, Oral, Daily    Multiple Vitamins-Minerals (Bariatric Multivitamins/Iron) CAPS 1 tablet, Oral, Daily    ONE TOUCH ULTRA TEST test strip USE FOR BLOOD GLUCOSE TESTING AS DIRECTED TWICE DAILY    ONETOUCH DELICA LANCETS 33G MISC 1 SYRINGE BY MISCELLANEOUS ROUTE 2 TIMES A DAY AS NEEDED DX E11.9 LAST O/V 10/20/18    oxybutynin (DITROPAN-XL) 10 mg, Oral, Daily    pantoprazole (PROTONIX) 40 mg, Oral, Daily (early morning)    polyethylene glycol (MIRALAX) 17 g, Oral    predniSONE 10 mg, Oral, Daily    psyllium (METAMUCIL) 0.52 g, Oral, Daily    Trelegy Ellipta 100-62.5-25 MCG/ACT inhaler     Trulicity 0.75 MG/0.5ML injection     valsartan (DIOVAN) 320 MG tablet No dose, route, or frequency recorded.          Physical Exam    /76   Pulse 82   Ht 5' 1\" (1.549 m)   SpO2 98%   BMI 30.42 kg/m²     GEN: AAO, No apparent distress.  Patient is well developed.  HEENT:  Pupils are equal, round and reactive.  Sclera are clear.  Fundoscopic exam is normal.  External ears are without lesions.  Oral pharynx is clear of ulcers or other lesions.  MMM.   NECK:  Supple.  There is no adenopathy appreciable in anterior or posterior cervical chains or supraclavicularly.  JVP is normal.    HEART: Regular rate and rhythm.  There is no appreciable murmur, gallop or rub.  LUNGS: Clear to auscultation.  ABD:  Soft, without tenderness, rebound or guarding.  No appreciable organomegally.  NEURO: Speech and cognition are normal.  Strength is 5/5 throughout.  Tone is normal.  DTRs are 2/4 at the knees, " ankles and elbows.  Gait is normal.  SKIN: There are no rashes or lesions    MUSCULOSKELETAL:   No synovitis noted      ________________________________________________________________________          Results Review    Comprehensive metabolic panel  Order: [id number]   suggestion  Information displayed in this report may not trend or trigger automated decision support.     Component  Ref Range & Units 3 wk ago   Sodium  136 - 145 mEQ/L 138   Potassium  3.5 - 5.1 mEQ/L 3.9   Comment: Results obtained on plasma. Plasma Potassium values may be up to 0.4 mEQ/L less than serum values. The differences may be greater for patients with high platelet or white cell counts.   Chloride  98 - 107 mEQ/L 104   CO2  21 - 31 mEQ/L 25   BUN  7 - 25 mg/dL 34 High    Creatinine  0.6 - 1.2 mg/dL 2.0 High    Glucose  70 - 99 mg/dL 175 High    Calcium  8.6 - 10.3 mg/dL 9.5   AST (SGOT)  13 - 39 IU/L 16   ALT (SGPT)  7 - 52 IU/L 9   Alkaline Phosphatase  34 - 125 IU/L 70   Total Protein  6.0 - 8.2 g/dL 7.7   Comment: Test performed on plasma which typically contains approximately 0.4 g/dL more protein than serum.   Albumin  3.5 - 5.7 g/dL 4.0   Bilirubin, Total  0.3 - 1.2 mg/dL 0.5   eGFR  >=60.0 mL/min/1.73m*2 22.6 Low    Comment: Calculation based on the Chronic Kidney Disease Epidemiology Collaboration (CKD-EPI) equation refit without adjustment for race.   Anion Gap  3 - 15 mEQ/L 9     Specimen Collected: 05/31/24  8:07 PM    Performed by: [facility] Last Resulted: 05/31/24  9:01 PM   Received From: [facility]  Result Received: 06/03/24  9:11 AM    View Encounter        Received Information   Contains abnormal data CBC and differential  Order: [id number]   suggestion  Information displayed in this report may not trend or trigger automated decision support.     Component  Ref Range & Units 3 wk ago   WBC  3.80 - 10.50 K/uL 9.83   RBC  3.93 - 5.22 M/uL 5.23 High    Hemoglobin  11.8 - 15.7 g/dL 14.9   Hematocrit  35.0 -  45.0 % 45.5 High    MCV  83.0 - 98.0 fL 87.0   MCH  28.0 - 33.2 pg 28.5   MCHC  32.2 - 35.5 g/dL 32.7   RDW  11.7 - 14.4 % 14.1   Platelets  150 - 369 K/uL 179   MPV  9.4 - 12.3 fL 10.2   Differential Type Auto   nRBC  <=0.0 % 0.0   Immature Granulocytes  % 0.3   Neutrophils  % 66.5   Lymphocytes  % 21.6   Monocytes  % 6.3   Eosinophils  % 4.5   Basophils  % 0.8   Immature Granulocytes, Absolute  0.00 - 0.08 K/uL 0.03   Neutrophils, Absolute  1.70 - 7.00 K/uL 6.54   Lymphocytes, Absolute  1.20 - 3.50 K/uL 2.12   Monocytes, Absolute  0.28 - 0.80 K/uL 0.62   Eosinophils, Absolute  0.04 - 0.36 K/uL 0.44 High    Basophils, Absolute  0.01 - 0.10 K/uL 0.08         Impressions     PMR  Diabetes mellitus  Neuropathy  Osteoarthritis  CKD (solitary kidney)    Plans:    Leigh Ann Moreno is a 89 y/o female with clinical s/s PMR    PMR on 10 mg prednisone. She would like to taper sooner due to DM    Taper by 1 mg every 2 weeks     At this time no GCA symptoms    Reviewed labs, RF, CCP negative. ESR and CRP elevated     (she will review with PCP regarding insulin management)    RTC 4 months      Thank you for involving me in this patient's care.        Monty Bueno MD  [facility] Rheumatology      I have spent a total time of 44 minutes in caring for this patient on the day of the visit/encounter including Diagnostic results, Prognosis, Impressions, Counseling / Coordination of care, Documenting in the medical record, Reviewing / ordering tests, medicine, procedures  , and Obtaining or reviewing history  .

## 2024-07-25 ENCOUNTER — OFFICE VISIT (OUTPATIENT)
Dept: PODIATRY | Facility: CLINIC | Age: 89
End: 2024-07-25
Payer: MEDICARE

## 2024-07-25 VITALS
HEART RATE: 93 BPM | DIASTOLIC BLOOD PRESSURE: 89 MMHG | HEIGHT: 61 IN | WEIGHT: 162 LBS | OXYGEN SATURATION: 98 % | SYSTOLIC BLOOD PRESSURE: 138 MMHG | BODY MASS INDEX: 30.58 KG/M2

## 2024-07-25 DIAGNOSIS — E11.9 CONTROLLED TYPE 2 DIABETES MELLITUS WITHOUT COMPLICATION, WITHOUT LONG-TERM CURRENT USE OF INSULIN (HCC): ICD-10-CM

## 2024-07-25 DIAGNOSIS — L84 CORNS: Primary | ICD-10-CM

## 2024-07-25 DIAGNOSIS — B35.1 ONYCHOMYCOSIS: ICD-10-CM

## 2024-07-25 DIAGNOSIS — I73.9 PERIPHERAL VASCULAR DISEASE, UNSPECIFIED (HCC): ICD-10-CM

## 2024-07-25 PROCEDURE — 11056 PARNG/CUTG B9 HYPRKR LES 2-4: CPT | Performed by: PODIATRIST

## 2024-07-25 PROCEDURE — RECHECK: Performed by: PODIATRIST
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
## 2024-08-01 ENCOUNTER — OFFICE VISIT (OUTPATIENT)
Dept: OBGYN CLINIC | Facility: CLINIC | Age: 89
End: 2024-08-01
Payer: MEDICARE

## 2024-08-01 VITALS
SYSTOLIC BLOOD PRESSURE: 153 MMHG | HEIGHT: 61 IN | WEIGHT: 160.94 LBS | BODY MASS INDEX: 30.39 KG/M2 | HEART RATE: 83 BPM | DIASTOLIC BLOOD PRESSURE: 86 MMHG

## 2024-08-01 DIAGNOSIS — M75.02 ADHESIVE CAPSULITIS OF LEFT SHOULDER: Primary | ICD-10-CM

## 2024-08-01 DIAGNOSIS — M25.512 LEFT SHOULDER PAIN, UNSPECIFIED CHRONICITY: ICD-10-CM

## 2024-08-01 PROCEDURE — 99213 OFFICE O/P EST LOW 20 MIN: CPT | Performed by: ORTHOPAEDIC SURGERY

## 2024-08-01 NOTE — PROGRESS NOTES
Assessment:       1. Adhesive capsulitis of left shoulder    2. Left shoulder pain, unspecified chronicity          Plan:        I explained my current clinical findings with the patient.  Overall, she has had improvement of the left shoulder pain and range of motion compared to the last office visit.  Given her history of type 2 diabetes mellitus and chronic kidney disease, we participated in shared decision making to avoid doing any glenohumeral corticosteroid injection at this time.  Recommend her to continue with left shoulder range of motion exercises at home.  Will follow-up in the future on an as-needed basis.  In the interim, she may apply topical lidocaine ointment to her left shoulder for any discomfort.  Patient expressed understanding and was in agreement with the treatment plan.            Subjective:     Patient ID: Leig hAnn Moreno is a 89 y.o. female.    Chief Complaint:    HPI  Patient presents today for a follow-up of her left shoulder pain for which she was last evaluated on 6/3/2024.  At her last office visit her symptoms were noted to be secondary to left shoulder adhesive capsulitis and complex regional pain syndrome.  Patient is a known uncontrolled type II diabetic.  Subsequent to her last office visit the patient was also seen by rheumatology and was started on low-dose prednisone for suspicion of polymyalgia rheumatica.  On today's office visit patient reports that her left shoulder pain has significantly improved though she still has some mild discomfort especially with reaching behind her back.  She denies any new injury.  Her last hemoglobin A1c on 7/16/2024 was noted to be 8.8 compared to 10.5 on 4/26/2024 indicating improved glycemic control.     Social History     Occupational History    Not on file   Tobacco Use    Smoking status: Former     Current packs/day: 0.00     Average packs/day: 1 pack/day for 25.0 years (25.0 ttl pk-yrs)     Types: Cigarettes     Start date: 1948     Quit  date:      Years since quittin.6    Smokeless tobacco: Former   Vaping Use    Vaping status: Never Used   Substance and Sexual Activity    Alcohol use: Not Currently    Drug use: Never    Sexual activity: Not Currently     Partners: Female     Birth control/protection: None      Review of Systems        Objective:     Left Shoulder Exam     Tenderness   Left shoulder tenderness location: Some discomfort over the posterior glenohumeral joint.    Range of Motion   Active abduction:  120   External rotation:  60   Forward flexion:  120   Internal rotation 0 degrees:  L3     Muscle Strength   Abduction: 4/5   Internal rotation: 4/5   External rotation: 4/5   Supraspinatus: 4/5   Subscapularis: 4/5   Biceps: 4/5     Tests   Nelson test: positive  Cross arm: negative  Impingement: positive  Drop arm: negative    Other   Sensation: normal         Physical Exam  Vitals and nursing note reviewed.   Constitutional:       Appearance: She is well-developed.   HENT:      Head: Normocephalic and atraumatic.   Pulmonary:      Effort: Pulmonary effort is normal. No respiratory distress.   Neurological:      Mental Status: She is alert and oriented to person, place, and time.      Cranial Nerves: No cranial nerve deficit.   Psychiatric:         Behavior: Behavior normal.         Thought Content: Thought content normal.         Judgment: Judgment normal.           I have personally reviewed pertinent films in PACS.

## 2024-10-30 ENCOUNTER — OFFICE VISIT (OUTPATIENT)
Dept: PODIATRY | Facility: CLINIC | Age: 89
End: 2024-10-30
Payer: MEDICARE

## 2024-10-30 VITALS
HEART RATE: 91 BPM | HEIGHT: 61 IN | BODY MASS INDEX: 31.34 KG/M2 | WEIGHT: 166 LBS | SYSTOLIC BLOOD PRESSURE: 126 MMHG | OXYGEN SATURATION: 97 % | DIASTOLIC BLOOD PRESSURE: 75 MMHG

## 2024-10-30 DIAGNOSIS — B35.1 ONYCHOMYCOSIS: Primary | ICD-10-CM

## 2024-10-30 DIAGNOSIS — I73.9 PERIPHERAL VASCULAR DISEASE, UNSPECIFIED (HCC): ICD-10-CM

## 2024-10-30 DIAGNOSIS — E11.9 CONTROLLED TYPE 2 DIABETES MELLITUS WITHOUT COMPLICATION, WITHOUT LONG-TERM CURRENT USE OF INSULIN (HCC): ICD-10-CM

## 2024-10-30 PROCEDURE — 11721 DEBRIDE NAIL 6 OR MORE: CPT | Performed by: PODIATRIST

## 2024-10-30 PROCEDURE — RECHECK: Performed by: PODIATRIST

## 2024-10-30 NOTE — PROGRESS NOTES
Assessment/Plan:     The patient's clinical examination today is significant for thickened and dystrophic, brittle pedal nail plates with subungual debris consistent with onychomycosis x 10.  Tender callused lesions noted to the medial aspects of the second toes bilaterally secondary to moderate bunion deformities.  There are no open lesions.  Pedal pulses are nonpalpable bilaterally.  Vibratory and epicritic sensations are diminished secondary to her history of peripheral neuropathy stemming from her diabetes.     The pedal nail plates are sharply debrided with a sterile nail clipper x 10 without complication.  The nails were then mechanically reduced in thickness and girth with a rotary bur without incident.  The callus lesions to her second toes were debrided without incident.  Her most recent hemoglobin A1c from July 2024 was 8.8, prior to that it was 10.5 in April 2024.     There are no other acute pedal issues noted today.  Recommend follow-up in 3 months for continued at risk diabetic footcare.     Diagnoses and all orders for this visit:    Onychomycosis    Controlled type 2 diabetes mellitus without complication, without long-term current use of insulin ([facility])    Peripheral vascular disease, unspecified ([facility])          Subjective:     Patient ID: Leigh Ann Moreno is a 89 y.o. female.    The patient presents today for at risk diabetic footcare.  She does note a history of numbness and tingling in her feet secondary to her peripheral neuropathy.  She also notes some tender calluses to her second toes bilaterally.  She denies any other acute pedal issues today.    PAST MEDICAL HISTORY:  Past Medical History:   Diagnosis Date   • Arthritis    • Asthma    • COPD (chronic obstructive pulmonary disease) ([facility])    • Diabetes mellitus ([facility])    • Difficulty walking    • Gout    • Hyperlipidemia    • Hypertension    • Hypothyroidism    • Neuropathy in diabetes ([facility])    • Overactive bladder    • PVD (peripheral vascular  disease) ([facility])    • Verruca        PAST SURGICAL HISTORY:  Past Surgical History:   Procedure Laterality Date   • ARTERIAL BYPASS SURGERY     • CAROTID ENDARTERECTOMY Right    • CATARACT EXTRACTION     • HYSTERECTOMY     • NEPHRECTOMY      in her 20s        ALLERGIES:  Penicillins, Ace inhibitors, Pollen extract, and Sulfa antibiotics    MEDICATIONS:  Current Outpatient Medications   Medication Sig Dispense Refill   • albuterol (ACCUNEB) 1.25 MG/3ML nebulizer solution Take 1.25 mg by nebulization 2 (two) times a day     • albuterol (PROVENTIL HFA,VENTOLIN HFA) 90 mcg/act inhaler Inhale 2 puffs every 4 (four) hours as needed  0   • aspirin (ECOTRIN LOW STRENGTH) 81 mg EC tablet Take 81 mg by mouth daily     • atorvastatin (LIPITOR) 40 mg tablet Take 40 mg by mouth daily     • BD PEN NEEDLE SKYLER U/F 32G X 4 MM MISC 1 SYRINGE BY MISCELLANEOUS ROUTE DAILY  0   • Calcium Carb-Cholecalciferol (CALTRATE 600+D3 SOFT PO) Take 2 tablets by mouth     • clobetasol (TEMOVATE) 0.05 % ointment Apply topically daily at bedtime 45 g 3   • dextromethorphan-guaiFENesin (ROBITUSSIN-DM)  mg/5 mL oral liquid Take 5 mL by mouth     • fluticasone (FLONASE) 50 mcg/act nasal spray 1 spray into each nostril     • guaiFENesin (MUCINEX) 600 mg 12 hr tablet Take 1 tablet (600 mg total) by mouth 2 (two) times a day (Patient taking differently: Take 600 mg by mouth if needed)  0   • insulin aspart (NovoLOG) 100 units/mL injection Per sliding scale     • insulin glargine (LANTUS SOLOSTAR) 100 units/mL injection pen Inject 15 Units under the skin daily at bedtime 15 mL 0   • ipratropium (ATROVENT) 0.06 % nasal spray 2 sprays into each nostril 2 (two) times a day (Patient taking differently: 2 sprays into each nostril if needed) 15 mL 3   • JARDIANCE 10 MG TABS Take 10 mg by mouth daily  0   • levalbuterol (XOPENEX) 0.63 mg/3 mL nebulizer solution Take 3 mL (0.63 mg total) by nebulization 2 (two) times a day     • levothyroxine 100 mcg tablet  Take 100 mcg by mouth daily     • lidocaine (XYLOCAINE) 5 % ointment Apply topically 3 (three) times a day as needed for mild pain (for left shoulder and arm pain) 50 g 2   • loratadine (CLARITIN) 10 mg tablet Take 10 mg by mouth daily     • Multiple Vitamin (multivitamin) tablet Take 1 tablet by mouth daily     • Multiple Vitamins-Minerals (Bariatric Multivitamins/Iron) CAPS Take 1 tablet by mouth daily     • oxybutynin (DITROPAN-XL) 10 MG 24 hr tablet Take 10 mg by mouth daily  0   • polyethylene glycol (MIRALAX) 17 g packet Take 17 g by mouth     • predniSONE 1 mg tablet Take 4 tablets (4 mg total) by mouth daily 120 tablet 6   • predniSONE 5 mg tablet Take 2 tablets (10 mg total) by mouth daily 30 tablet 11   • predniSONE 5 mg tablet Take 1 tablet (5 mg total) by mouth daily 30 tablet 11   • psyllium (METAMUCIL) 0.52 g capsule Take 0.52 g by mouth daily     • Trelegy Ellipta 100-62.5-25 MCG/ACT inhaler      • Trulicity 0.75 MG/0.5ML injection      • valsartan (DIOVAN) 320 MG tablet      • benzonatate (TESSALON) 200 MG capsule Take 1 capsule (200 mg total) by mouth 3 (three) times a day (Patient not taking: Reported on 6/27/2024) 90 capsule 0   • Dulaglutide 1.5 MG/0.5ML SOPN Inject 1.5 mg under the skin once a week (Patient not taking: Reported on 7/24/2024)     • fluticasone-umeclidinium-vilanterol (Trelegy Ellipta) 200-62.5-25 mcg/actuation AEPB inhaler Inhale 1 puff daily Rinse mouth after use. 60 blister 3   • ONE TOUCH ULTRA TEST test strip USE FOR BLOOD GLUCOSE TESTING AS DIRECTED TWICE DAILY (Patient not taking: Reported on 6/27/2024)  1   • ONETOUCH DELICA LANCETS 33G MISC 1 SYRINGE BY MISCELLANEOUS ROUTE 2 TIMES A DAY AS NEEDED DX E11.9 LAST O/V 10/20/18 (Patient not taking: Reported on 6/27/2024)  1   • pantoprazole (PROTONIX) 40 mg tablet Take 1 tablet (40 mg total) by mouth daily in the early morning Do not start before November 17, 2023. (Patient not taking: Reported on 6/27/2024) 30 tablet 0     No  current facility-administered medications for this visit.       SOCIAL HISTORY:  Social History     Socioeconomic History   • Marital status:      Spouse name: None   • Number of children: None   • Years of education: None   • Highest education level: None   Occupational History   • None   Tobacco Use   • Smoking status: Former     Current packs/day: 0.00     Average packs/day: 1 pack/day for 25.0 years (25.0 ttl pk-yrs)     Types: Cigarettes     Start date:      Quit date:      Years since quittin.8   • Smokeless tobacco: Former   Vaping Use   • Vaping status: Never Used   Substance and Sexual Activity   • Alcohol use: Not Currently   • Drug use: Never   • Sexual activity: Not Currently     Partners: Female     Birth control/protection: None   Other Topics Concern   • None   Social History Narrative   • None     Social Determinants of Health     Financial Resource Strain: Low Risk  (2024)    Received from [facility]    Overall Financial Resource Strain (CARDIA)    • Difficulty of Paying Living Expenses: Not hard at all   Food Insecurity: No Food Insecurity (2024)    Received from [facility]    Hunger Vital Sign    • Worried About Running Out of Food in the Last Year: Never true    • Ran Out of Food in the Last Year: Never true   Transportation Needs: Unknown (2024)    Received from [facility]    PRAPARE - Transportation    • Lack of Transportation (Medical): No    • Lack of Transportation (Non-Medical): Not on file   Physical Activity: Not on file   Stress: Not on file   Social Connections: Unknown (2024)    Received from [facility]    Social Connection and Isolation Panel [NHANES]    • Frequency of Communication with Friends and Family: Twice a week    • Frequency of Social Gatherings with Friends and Family: Not on file    • Attends [employer] Services: Not on file    • Active Member of Clubs or  Organizations: Not on file    • Attends Club or Organization Meetings: Not on file    • Marital Status: Not on file   Intimate Partner Violence: At Risk (4/19/2024)    Received from [facility]    Humiliation, Afraid, Rape, and Kick questionnaire    • Fear of Current or Ex-Partner: Yes    • Emotionally Abused: Not on file    • Physically Abused: Not on file    • Sexually Abused: Not on file   Housing Stability: Unknown (4/19/2024)    Received from [facility]    Housing Stability Vital Sign    • Unable to Pay for Housing in the Last Year: No    • Number of Places Lived in the Last Year: Not on file    • Unstable Housing in the Last Year: Not on file        Review of Systems   Constitutional: Negative.    HENT: Negative.     Eyes: Negative.    Respiratory: Negative.     Cardiovascular: Negative.    Endocrine: Negative.    Musculoskeletal: Negative.    Neurological: Negative.    Hematological: Negative.    Psychiatric/Behavioral: Negative.           Objective:     Physical Exam  Constitutional:       Appearance: Normal appearance.   HENT:      Head: Normocephalic and atraumatic.      Nose: Nose normal.   Cardiovascular:      Pulses:           Dorsalis pedis pulses are 1+ on the right side and 1+ on the left side.        Posterior tibial pulses are 0 on the right side and 0 on the left side.   Pulmonary:      Effort: Pulmonary effort is normal.   Feet:      Right foot:      Skin integrity: Dry skin present.      Toenail Condition: Right toenails are abnormally thick and long. Fungal disease present.     Left foot:      Skin integrity: Dry skin present.      Toenail Condition: Left toenails are abnormally thick and long. Fungal disease present.  Skin:     General: Skin is warm.      Capillary Refill: Capillary refill takes less than 2 seconds.   Neurological:      General: No focal deficit present.      Mental Status: She is alert and oriented to person, place, and time.    Psychiatric:         Mood and Affect: Mood normal.         Behavior: Behavior normal.         Thought Content: Thought content normal.

## 2024-11-08 DIAGNOSIS — J44.89 COPD (CHRONIC OBSTRUCTIVE PULMONARY DISEASE) WITH CHRONIC BRONCHITIS (HCC): ICD-10-CM

## 2024-11-11 ENCOUNTER — NURSE TRIAGE (OUTPATIENT)
Age: 89
End: 2024-11-11

## 2024-11-11 ENCOUNTER — HOSPITAL ENCOUNTER (EMERGENCY)
Facility: HOSPITAL | Age: 89
Discharge: HOME | End: 2024-11-11
Attending: EMERGENCY MEDICINE
Payer: MEDICARE

## 2024-11-11 ENCOUNTER — APPOINTMENT (EMERGENCY)
Dept: RADIOLOGY | Facility: HOSPITAL | Age: 89
End: 2024-11-11
Payer: MEDICARE

## 2024-11-11 VITALS
SYSTOLIC BLOOD PRESSURE: 185 MMHG | HEART RATE: 116 BPM | DIASTOLIC BLOOD PRESSURE: 82 MMHG | TEMPERATURE: 99.1 F | RESPIRATION RATE: 18 BRPM | OXYGEN SATURATION: 98 %

## 2024-11-11 DIAGNOSIS — J45.21 MILD INTERMITTENT ASTHMA WITH EXACERBATION: ICD-10-CM

## 2024-11-11 DIAGNOSIS — R06.02 SOB (SHORTNESS OF BREATH): Primary | ICD-10-CM

## 2024-11-11 LAB
2HR DELTA HS TROPONIN: -2 NG/L
ALBUMIN SERPL BCG-MCNC: 4 G/DL (ref 3.5–5)
ALP SERPL-CCNC: 71 U/L (ref 34–104)
ALT SERPL W P-5'-P-CCNC: 10 U/L (ref 7–52)
ANION GAP SERPL CALCULATED.3IONS-SCNC: 7 MMOL/L (ref 4–13)
AST SERPL W P-5'-P-CCNC: 18 U/L (ref 13–39)
BASOPHILS # BLD AUTO: 0.07 THOUSANDS/ÂΜL (ref 0–0.1)
BASOPHILS NFR BLD AUTO: 1 % (ref 0–1)
BILIRUB SERPL-MCNC: 0.39 MG/DL (ref 0.2–1)
BUN SERPL-MCNC: 28 MG/DL (ref 5–25)
CALCIUM SERPL-MCNC: 9.3 MG/DL (ref 8.4–10.2)
CARDIAC TROPONIN I PNL SERPL HS: 6 NG/L
CARDIAC TROPONIN I PNL SERPL HS: 8 NG/L
CHLORIDE SERPL-SCNC: 107 MMOL/L (ref 96–108)
CO2 SERPL-SCNC: 25 MMOL/L (ref 21–32)
CREAT SERPL-MCNC: 1.91 MG/DL (ref 0.6–1.3)
EOSINOPHIL # BLD AUTO: 0.45 THOUSAND/ÂΜL (ref 0–0.61)
EOSINOPHIL NFR BLD AUTO: 7 % (ref 0–6)
ERYTHROCYTE [DISTWIDTH] IN BLOOD BY AUTOMATED COUNT: 14.6 % (ref 11.6–15.1)
FLUAV AG UPPER RESP QL IA.RAPID: NEGATIVE
FLUBV AG UPPER RESP QL IA.RAPID: NEGATIVE
GFR SERPL CREATININE-BSD FRML MDRD: 21 ML/MIN/1.73SQ M
GLUCOSE SERPL-MCNC: 166 MG/DL (ref 65–140)
HCT VFR BLD AUTO: 44.5 % (ref 34.8–46.1)
HGB BLD-MCNC: 14.6 G/DL (ref 11.5–15.4)
IMM GRANULOCYTES # BLD AUTO: 0.03 THOUSAND/UL (ref 0–0.2)
IMM GRANULOCYTES NFR BLD AUTO: 0 % (ref 0–2)
LYMPHOCYTES # BLD AUTO: 2.04 THOUSANDS/ÂΜL (ref 0.6–4.47)
LYMPHOCYTES NFR BLD AUTO: 29 % (ref 14–44)
MCH RBC QN AUTO: 28.7 PG (ref 26.8–34.3)
MCHC RBC AUTO-ENTMCNC: 32.8 G/DL (ref 31.4–37.4)
MCV RBC AUTO: 88 FL (ref 82–98)
MONOCYTES # BLD AUTO: 0.65 THOUSAND/ÂΜL (ref 0.17–1.22)
MONOCYTES NFR BLD AUTO: 9 % (ref 4–12)
NEUTROPHILS # BLD AUTO: 3.69 THOUSANDS/ÂΜL (ref 1.85–7.62)
NEUTS SEG NFR BLD AUTO: 54 % (ref 43–75)
NRBC BLD AUTO-RTO: 0 /100 WBCS
PLATELET # BLD AUTO: 163 THOUSANDS/UL (ref 149–390)
PMV BLD AUTO: 10.5 FL (ref 8.9–12.7)
POTASSIUM SERPL-SCNC: 4.2 MMOL/L (ref 3.5–5.3)
PROT SERPL-MCNC: 7.5 G/DL (ref 6.4–8.4)
RBC # BLD AUTO: 5.08 MILLION/UL (ref 3.81–5.12)
SARS-COV+SARS-COV-2 AG RESP QL IA.RAPID: NEGATIVE
SODIUM SERPL-SCNC: 139 MMOL/L (ref 135–147)
WBC # BLD AUTO: 6.93 THOUSAND/UL (ref 4.31–10.16)

## 2024-11-11 PROCEDURE — 87804 INFLUENZA ASSAY W/OPTIC: CPT | Performed by: EMERGENCY MEDICINE

## 2024-11-11 PROCEDURE — 71045 X-RAY EXAM CHEST 1 VIEW: CPT

## 2024-11-11 PROCEDURE — 96374 THER/PROPH/DIAG INJ IV PUSH: CPT

## 2024-11-11 PROCEDURE — 85025 COMPLETE CBC W/AUTO DIFF WBC: CPT | Performed by: EMERGENCY MEDICINE

## 2024-11-11 PROCEDURE — 84484 ASSAY OF TROPONIN QUANT: CPT | Performed by: EMERGENCY MEDICINE

## 2024-11-11 PROCEDURE — 87811 SARS-COV-2 COVID19 W/OPTIC: CPT | Performed by: EMERGENCY MEDICINE

## 2024-11-11 PROCEDURE — 36415 COLL VENOUS BLD VENIPUNCTURE: CPT

## 2024-11-11 PROCEDURE — 99285 EMERGENCY DEPT VISIT HI MDM: CPT

## 2024-11-11 PROCEDURE — 80053 COMPREHEN METABOLIC PANEL: CPT | Performed by: EMERGENCY MEDICINE

## 2024-11-11 PROCEDURE — 99285 EMERGENCY DEPT VISIT HI MDM: CPT | Performed by: EMERGENCY MEDICINE

## 2024-11-11 PROCEDURE — 94640 AIRWAY INHALATION TREATMENT: CPT

## 2024-11-11 PROCEDURE — 93005 ELECTROCARDIOGRAM TRACING: CPT

## 2024-11-11 RX ORDER — METHYLPREDNISOLONE SODIUM SUCCINATE 125 MG/2ML
125 INJECTION, POWDER, LYOPHILIZED, FOR SOLUTION INTRAMUSCULAR; INTRAVENOUS ONCE
Status: COMPLETED | OUTPATIENT
Start: 2024-11-11 | End: 2024-11-11

## 2024-11-11 RX ORDER — IPRATROPIUM BROMIDE AND ALBUTEROL SULFATE 2.5; .5 MG/3ML; MG/3ML
3 SOLUTION RESPIRATORY (INHALATION) ONCE
Status: COMPLETED | OUTPATIENT
Start: 2024-11-11 | End: 2024-11-11

## 2024-11-11 RX ORDER — PREDNISONE 20 MG/1
40 TABLET ORAL DAILY
Qty: 10 TABLET | Refills: 0 | Status: SHIPPED | OUTPATIENT
Start: 2024-11-11 | End: 2024-11-16

## 2024-11-11 RX ORDER — FLUTICASONE FUROATE, UMECLIDINIUM BROMIDE AND VILANTEROL TRIFENATATE 200; 62.5; 25 UG/1; UG/1; UG/1
1 POWDER RESPIRATORY (INHALATION) DAILY
Qty: 60 EACH | Refills: 3 | Status: SHIPPED | OUTPATIENT
Start: 2024-11-11 | End: 2025-03-11

## 2024-11-11 RX ORDER — ALBUTEROL SULFATE 0.83 MG/ML
5 SOLUTION RESPIRATORY (INHALATION) ONCE
Status: COMPLETED | OUTPATIENT
Start: 2024-11-11 | End: 2024-11-11

## 2024-11-11 RX ADMIN — IPRATROPIUM BROMIDE AND ALBUTEROL SULFATE 3 ML: 2.5; .5 SOLUTION RESPIRATORY (INHALATION) at 18:13

## 2024-11-11 RX ADMIN — METHYLPREDNISOLONE SODIUM SUCCINATE 125 MG: 125 INJECTION, POWDER, FOR SOLUTION INTRAMUSCULAR; INTRAVENOUS at 17:00

## 2024-11-11 RX ADMIN — ALBUTEROL SULFATE 5 MG: 2.5 SOLUTION RESPIRATORY (INHALATION) at 16:59

## 2024-11-11 RX ADMIN — IPRATROPIUM BROMIDE 0.5 MG: 0.5 SOLUTION RESPIRATORY (INHALATION) at 16:59

## 2024-11-11 NOTE — ED ATTENDING ATTESTATION
11/11/2024  I, Shira Vanessa MD, saw and evaluated the patient. I have discussed the patient with the resident/non-physician practitioner and agree with the resident's/non-physician practitioner's findings, Plan of Care, and MDM as documented in the resident's/non-physician practitioner's note, except where noted. All available labs and Radiology studies were reviewed.  I was present for key portions of any procedure(s) performed by the resident/non-physician practitioner and I was immediately available to provide assistance.       At this point I agree with the current assessment done in the Emergency Department.  I have conducted an independent evaluation of this patient a history and physical is as follows:    This is a 89-year-old female with a relevant past medical history of hypertension, diabetes, hyperlipidemia, CAD, COPD, presenting to the ED today for complaint of shortness of breath.  She also states that she has a cough, which is productive of yellowish sputum.  She has not had a fever at home.  She has not had any peripheral edema or erythema.  She states that she has had some chest discomfort, occasionally, but mainly when she coughs.  Her exam for the most part is unremarkable aside from some scattered wheezes throughout her lungs, mainly expiratory.  She also has rhonchorous breath sounds in her bilateral bases, right greater than left.  She does not have any significant peripheral edema.  Her differential diagnosis includes: COPD exacerbation versus pneumonia versus new onset CHF versus other.  Patient has a CBC showing no significant acute abnormalities aside from a peripheral eosinophilia.  Her metabolic panel shows renal insufficiency, but this is chronic for her.  Her flu COVID were negative.  Her troponin is 8.  She received a nebulizer treatment x 1 with improvement in her symptoms, was given some steroids as well.  Her chest x-ray does not show any evidence of pneumonia.  She was  "slightly hypertensive during her time here.  She received another breathing treatment, and felt safe going home.  She was discharged home with steroids, and close follow-up with her PCP.  The management plan was discussed in detail with the patient at bedside and all questions were answered. Strict ED return instructions were discussed at bedside. Prior to discharge, both verbal and written instructions were provided. We discussed the signs and symptoms that should prompt the patient to return to the ED. All questions were answered and the patient was comfortable with the plan of care and discharged home. The patient agrees to return to the Emergency Department for concerns and/or progression of illness.    Portions of the above record have been created with voice recognition software.  Occasional wrong word or \"sound alike\" substitutions may have occurred due to the inherent limitations of voice recognition software.  Read the chart carefully and recognize, using context, where substitutions may have occurred.    ED Course         Critical Care Time  Procedures      "

## 2024-11-11 NOTE — TELEPHONE ENCOUNTER
"Reason for Disposition  • MODERATE difficulty breathing (e.g., speaks in phrases, SOB even at rest, pulse 100-120)    Answer Assessment - Initial Assessment Questions  1. SYMPTOMS: \"What is your main symptom or concern?\" (e.g., cough, fever, shortness of breath, muscle aches)      Cough  2. ONSET: \"When did the symptoms start?\"       Last week   3. COUGH: \"Do you have a cough?\" If Yes, ask: \"How bad is the cough?\"        Yes-productive for yellow sputum  4. FEVER: \"Do you have a fever?\" If Yes, ask: \"What is your temperature, how was it measured, and when did it start?\"      Denies  5. BREATHING DIFFICULTY: \"Are you having any difficulty breathing?\" (e.g., normal; shortness of breath, wheezing, unable to speak)       Shortness of breath, wheeze  6. BETTER-SAME-WORSE: \"Are you getting better, staying the same or getting worse compared to yesterday?\"  If getting worse, ask, \"In what way?\"      Worse  7. OTHER SYMPTOMS: \"Do you have any other symptoms?\"  (e.g., chills, fatigue, headache, loss of smell or taste, muscle pain, sore throat)      Fatigue   8. COVID-19 DIAGNOSIS: \"How do you know that you have COVID?\" (e.g., positive lab test or self-test, diagnosed by doctor or NP/PA, symptoms after exposure).      Suspected  9. COVID-19 EXPOSURE: \"Was there any known exposure to COVID before the symptoms began?\"       Yes- daughter who is her caretaker tested positive  10. COVID-19 VACCINE: \"Have you had the COVID-19 vaccine?\" If Yes, ask: \"When did you last get it?\"        Unspecified  11. HIGH RISK DISEASE: \"Do you have any chronic medical problems?\" (e.g., asthma, heart or lung disease, weak immune system, obesity, etc.)        COPD  12. PREGNANCY: \"Is there any chance you are pregnant?\" \"When was your last menstrual period?\"        NA  13. O2 SATURATION MONITOR:  \"Do you use an oxygen saturation monitor (pulse oximeter) at home?\" If Yes, ask \"What is your reading (oxygen level) today?\" \"What is your usual oxygen " "saturation reading?\" (e.g., 95%)        93%    Protocols used: COVID-19 - Diagnosed or Suspected-Adult-[place]    "

## 2024-11-11 NOTE — TELEPHONE ENCOUNTER
Pt stated Pulm Provider: Dr. Lomax    Actionable item: Recommended ER, declined.     What is the reason for the call/chief complaint?    Pt's daughter calling for increased SOB. Pt has a hx of COPD. She is experiencing increased cough productive for yellow sputum. Her daughter recently tested positive for COVID so presumably pt is positive as well since they live with each other. Pt is also experiencing increased SOB with exertion. Wheezing as well. Denies fevers. Recommended pt be evaluated in ER due to her increased SOB/wheezing. Pt's daughter unsure that she will bring her.

## 2024-11-12 NOTE — DISCHARGE INSTRUCTIONS
Please come back to the ER if you are having new or worsening symptoms including worsening shortness of breath, wheezing, chest pain, or feeling like you are going to pass out.    Please follow-up with your PCP.

## 2024-11-13 LAB
ATRIAL RATE: 108 BPM
P AXIS: 71 DEGREES
PR INTERVAL: 198 MS
QRS AXIS: -83 DEGREES
QRSD INTERVAL: 140 MS
QT INTERVAL: 380 MS
QTC INTERVAL: 509 MS
T WAVE AXIS: 46 DEGREES
VENTRICULAR RATE: 108 BPM

## 2024-11-13 PROCEDURE — 93010 ELECTROCARDIOGRAM REPORT: CPT | Performed by: INTERNAL MEDICINE

## 2024-11-13 NOTE — TELEPHONE ENCOUNTER
Called and spoke with pt's daughter, Anette. Symptoms are not getting any better but also not getting worse. Pt is unable to come in for appt while Anette is admitted because she does not drive and is staying there with her. She is concerned her mother has COVID. She did test negative in the ER but Anette tested negative initially and now tested positive.

## 2024-11-14 NOTE — ED PROVIDER NOTES
Time reflects when diagnosis was documented in both MDM as applicable and the Disposition within this note       Time User Action Codes Description Comment    11/11/2024  7:41 PM Maikel Wahl Add [R06.02] SOB (shortness of breath)     11/11/2024  7:42 PM Maikel Wahl Add [J98.01] Bronchospasm     11/11/2024  7:42 PM JasontiffanieMaikel Remove [J98.01] Bronchospasm     11/11/2024  7:54 PM OtfShira Subha [J45.21] Mild intermittent asthma with exacerbation           ED Disposition       ED Disposition   Discharge    Condition   Stable    Date/Time   Mon Nov 11, 2024  7:54 PM    Comment   Leigh Ann Moreno discharge to home/self care.                   Assessment & Plan       Medical Decision Making  Patient is a 89-year-old female with history of COPD, asthma who presented to the ED for shortness of breath.  She stated that over the last week she has had worsening of her shortness of breath as well as cough, feels similar to her COPD exacerbations in the past.  She states that she has had to have her breathing treatment multiple times at home.  Patient denies any fevers, chills, or other systemic symptoms.  Patient was seen to have mild audible wheezing in the room, no accessory muscle use, saturating well on room air.  Hemodynamically stable.    Differential diagnosis includes but is not limited to COPD exacerbation, asthma exacerbation, pneumonia, viral URI, doubt ACS or PE.  CBC, CMP grossly unremarkable with the exception of kidney function which is impaired but appears to be at patient's known baseline.  CXR without pneumonia or other acute process.  EKG nonischemic.  Viral swab negative.  Patient's significant wheezing on exam as well as grossly normal workup otherwise is highly suggestive of obstructive lung disease exacerbation.  Patient was treated with multiple bronchodilator nebulizer treatments and given steroids as well as prescribed steroids for outpatient use and counseled on how to take them.  Patient  appeared much better with minimal wheezing on repeat exam after getting the last because of treatment.  She stated that she felt much better and would like to go home.  Patient was counseled extensively and discharged with close return precautions.    Amount and/or Complexity of Data Reviewed  Labs: ordered.  Radiology: ordered.    Risk  Prescription drug management.             Medications   methylPREDNISolone sodium succinate (Solu-MEDROL) injection 125 mg (125 mg Intravenous Given 11/11/24 1700)   albuterol inhalation solution 5 mg (5 mg Nebulization Given 11/11/24 1659)   ipratropium (ATROVENT) 0.02 % inhalation solution 0.5 mg (0.5 mg Nebulization Given 11/11/24 1659)   ipratropium-albuterol (DUO-NEB) 0.5-2.5 mg/3 mL inhalation solution 3 mL (3 mL Nebulization Given 11/11/24 1813)       ED Risk Strat Scores                                               History of Present Illness       Chief Complaint   Patient presents with    Cough     Pt reports cough and sob x 3 days        Past Medical History:   Diagnosis Date    Arthritis     Asthma     COPD (chronic obstructive pulmonary disease) (HCC)     Diabetes mellitus (HCC)     Difficulty walking     Gout     Hyperlipidemia     Hypertension     Hypothyroidism     Neuropathy in diabetes (HCC)     Overactive bladder     PVD (peripheral vascular disease) (HCC)     Verruca       Past Surgical History:   Procedure Laterality Date    ARTERIAL BYPASS SURGERY      CAROTID ENDARTERECTOMY Right     CATARACT EXTRACTION      HYSTERECTOMY      NEPHRECTOMY      in her 20s      Family History   Problem Relation Age of Onset    Asthma Mother     Asthma Father     Diabetes Father     Arthritis Father     Hypertension Father     Lung cancer Sister     Early death Brother 36    COPD Daughter     Asthma Daughter     Rheum arthritis Daughter     Cataracts Daughter     Liver cancer Son     Alzheimer's disease Sister     Rectal cancer Sister     Thyroid cancer Sister     No Known  Problems Sister     Asthma Son       Social History     Tobacco Use    Smoking status: Former     Current packs/day: 0.00     Average packs/day: 1 pack/day for 25.0 years (25.0 ttl pk-yrs)     Types: Cigarettes     Start date:      Quit date:      Years since quittin.9    Smokeless tobacco: Former   Vaping Use    Vaping status: Never Used   Substance Use Topics    Alcohol use: Not Currently    Drug use: Never      E-Cigarette/Vaping    E-Cigarette Use Never User       E-Cigarette/Vaping Substances    Nicotine No     THC No     CBD No     Flavoring No     Other No     Unknown No       I have reviewed and agree with the history as documented.     Patient is a 89-year-old female with history of COPD who presented to the ED for shortness of breath.  She stated that over the last week she has had worsening of her shortness of breath as well as cough, feels similar to her COPD exacerbations in the past.  She states that she has had to have her breathing treatment multiple times at home.  Patient denies any fevers, chills, or other systemic symptoms.  Patient was seen to have mild audible wheezing in the room, no accessory muscle use, saturating well on room air.  Hemodynamically stable.          Review of Systems   Constitutional:  Negative for chills and fever.   Respiratory:  Positive for cough, shortness of breath and wheezing.    Cardiovascular:  Negative for chest pain and palpitations.   Gastrointestinal:  Negative for abdominal pain, nausea and vomiting.   Genitourinary:  Negative for dysuria, frequency and urgency.   Skin:  Negative for color change and pallor.   Neurological:  Negative for dizziness, syncope and headaches.   Psychiatric/Behavioral: Negative.             Objective       ED Triage Vitals   Temperature Pulse Blood Pressure Respirations SpO2 Patient Position - Orthostatic VS   24 1610 24 1610 24 1610 24 1610 24 1610 24 1610   99.1 °F (37.3 °C) (!) 108  (!) 179/111 18 97 % Sitting      Temp Source Heart Rate Source BP Location FiO2 (%) Pain Score    11/11/24 1610 11/11/24 1906 11/11/24 1610 -- --    Oral Monitor Right arm        Vitals      Date and Time Temp Pulse SpO2 Resp BP Pain Score FACES Pain Rating User   11/11/24 1906 -- 116 98 % 18 185/82 -- -- AB   11/11/24 1703 -- 92 -- -- -- -- -- TA   11/11/24 1610 99.1 °F (37.3 °C) 108 97 % 18 179/111 -- -- RLN            Physical Exam  On examination:  The patient is awake, alert and oriented  HEENT: Normocephalic/atraumatic  External examination of the ears is unremarkable  Pupils are equal round and reactive to light, there is no conjunctival injection or scleral icterus noted  Nares are patent without rhinorrhea.  The oropharynx is moist without injection  The neck is supple  Lungs: Diffuse end expiratory wheezes bilaterally, no accessory muscle use.  Heart: Regular without murmurs rubs or gallops  Abdomen: Soft and nontender. There are positive bowel sounds. there is no rebound or guarding  Musculoskeletal: Normal range of motion with grossly normal strength  Neuro: Cranial nerves II through XII grossly intact. Nonfocal exam  Skin: No rash noted  Psych: Mood and affect normal      Results Reviewed       Procedure Component Value Units Date/Time    HS Troponin I 2hr [[id number]]  (Normal) Collected: 11/11/24 1906    Lab Status: Final result Specimen: Blood from Arm, Right Updated: 11/11/24 1942     hs TnI 2hr 6 ng/L      Delta 2hr hsTnI -2 ng/L     HS Troponin 0hr (reflex protocol) [[id number]]  (Normal) Collected: 11/11/24 1623    Lab Status: Final result Specimen: Blood from Arm, Right Updated: 11/11/24 1658     hs TnI 0hr 8 ng/L     Comprehensive metabolic panel [[id number]]  (Abnormal) Collected: 11/11/24 1623    Lab Status: Final result Specimen: Blood from Arm, Right Updated: 11/11/24 1651     Sodium 139 mmol/L      Potassium 4.2 mmol/L      Chloride 107 mmol/L      CO2 25 mmol/L      ANION GAP 7 mmol/L       BUN 28 mg/dL      Creatinine 1.91 mg/dL      Glucose 166 mg/dL      Calcium 9.3 mg/dL      AST 18 U/L      ALT 10 U/L      Alkaline Phosphatase 71 U/L      Total Protein 7.5 g/dL      Albumin 4.0 g/dL      Total Bilirubin 0.39 mg/dL      eGFR 21 ml/min/1.73sq m     Narrative:      National Kidney Disease Foundation guidelines for Chronic Kidney Disease (CKD):     Stage 1 with normal or high GFR (GFR > 90 mL/min/1.73 square meters)    Stage 2 Mild CKD (GFR = 60-89 mL/min/1.73 square meters)    Stage 3A Moderate CKD (GFR = 45-59 mL/min/1.73 square meters)    Stage 3B Moderate CKD (GFR = 30-44 mL/min/1.73 square meters)    Stage 4 Severe CKD (GFR = 15-29 mL/min/1.73 square meters)    Stage 5 End Stage CKD (GFR <15 mL/min/1.73 square meters)  Note: GFR calculation is accurate only with a steady state creatinine    FLU/COVID Rapid Antigen (30 min. TAT) - Preferred screening test in ED [[id number]]  (Normal) Collected: 11/11/24 1617    Lab Status: Final result Specimen: Nares from Nose Updated: 11/11/24 1648     SARS COV Rapid Antigen Negative     Influenza A Rapid Antigen Negative     Influenza B Rapid Antigen Negative    Narrative:      This test has been performed using the Quidel Hanna 2 FLU+SARS Antigen test under the Emergency Use Authorization (EUA). This test has been validated by the  and verified by the performing laboratory. The Hanna uses lateral flow immunofluorescent sandwich assay to detect SARS-COV, Influenza A and Influenza B Antigen.     The Quidel Hanna 2 SARS Antigen test does not differentiate between SARS-CoV and SARS-CoV-2.     Negative results are presumptive and may be confirmed with a molecular assay, if necessary, for patient management. Negative results do not rule out SARS-CoV-2 or influenza infection and should not be used as the sole basis for treatment or patient management decisions. A negative test result may occur if the level of antigen in a sample is below the limit of  detection of this test.     Positive results are indicative of the presence of viral antigens, but do not rule out bacterial infection or co-infection with other viruses.     All test results should be used as an adjunct to clinical observations and other information available to the provider.    FOR PEDIATRIC PATIENTS - copy/paste COVID Guidelines URL to browser: [email or web address]    CBC and differential [[id number]]  (Abnormal) Collected: 11/11/24 1623    Lab Status: Final result Specimen: Blood from Arm, Right Updated: 11/11/24 1639     WBC 6.93 Thousand/uL      RBC 5.08 Million/uL      Hemoglobin 14.6 g/dL      Hematocrit 44.5 %      MCV 88 fL      MCH 28.7 pg      MCHC 32.8 g/dL      RDW 14.6 %      MPV 10.5 fL      Platelets 163 Thousands/uL      nRBC 0 /100 WBCs      Segmented % 54 %      Immature Grans % 0 %      Lymphocytes % 29 %      Monocytes % 9 %      Eosinophils Relative 7 %      Basophils Relative 1 %      Absolute Neutrophils 3.69 Thousands/µL      Absolute Immature Grans 0.03 Thousand/uL      Absolute Lymphocytes 2.04 Thousands/µL      Absolute Monocytes 0.65 Thousand/µL      Eosinophils Absolute 0.45 Thousand/µL      Basophils Absolute 0.07 Thousands/µL             XR chest 1 view portable   Final Interpretation by Rubén Mendez MD (11/11 1654)      No acute cardiopulmonary disease.            Workstation performed: [id number]             ECG 12 Lead Documentation Only    Date/Time: 11/11/2024 4:15 PM    Performed by: Maikel Wahl MD  Authorized by: Maikel Wahl MD    Indications / Diagnosis:  SOB  ECG reviewed by me, the ED Provider: yes    Patient location:  ED  Previous ECG:     Comparison to cardiac monitor: Yes    Interpretation:     Interpretation: non-specific    Rate:     ECG rate:  108    ECG rate assessment: tachycardic    Rhythm:     Rhythm: sinus tachycardia    Ectopy:     Ectopy: none    QRS:     QRS axis:   Indeterminate    QRS intervals:  Wide  Conduction:     Conduction: abnormal      Abnormal conduction: complete RBBB and bifascicular block    ST segments:     ST segments:  Normal  T waves:     T waves: normal        ED Medication and Procedure Management   Prior to Admission Medications   Prescriptions Last Dose Informant Patient Reported? Taking?   BD PEN NEEDLE SKYLER U/F 32G X 4 MM MISC  Child, Self Yes No   Si SYRINGE BY MISCELLANEOUS ROUTE DAILY   Calcium Carb-Cholecalciferol (CALTRATE 600+D3 SOFT PO)  Child, Self Yes No   Sig: Take 2 tablets by mouth   Dulaglutide 1.5 MG/0.5ML SOPN  Child, Self Yes No   Sig: Inject 1.5 mg under the skin once a week   Patient not taking: Reported on 2024   JARDIANCE 10 MG TABS  Child, Self Yes No   Sig: Take 10 mg by mouth daily   Multiple Vitamin (multivitamin) tablet  Self, Child Yes No   Sig: Take 1 tablet by mouth daily   Multiple Vitamins-Minerals (Bariatric Multivitamins/Iron) CAPS  Self, Child Yes No   Sig: Take 1 tablet by mouth daily   ONE TOUCH ULTRA TEST test strip  Child, Self Yes No   Sig: USE FOR BLOOD GLUCOSE TESTING AS DIRECTED TWICE DAILY   Patient not taking: Reported on 2024   ONETOUCH DELICA LANCETS 33G MISC  Child, Self Yes No   Si SYRINGE BY MISCELLANEOUS ROUTE 2 TIMES A DAY AS NEEDED DX E11.9 LAST O/V 10/20/18   Patient not taking: Reported on 2024   Trelegy Ellipta 100-62.5-25 MCG/ACT inhaler  Self, Child Yes No   Trelegy Ellipta 200-62.5-25 MCG/ACT AEPB inhaler   No No   Sig: INHALE 1 PUFF DAILY. RINSE MOUTH AFTER USE   Trulicity 0.75 MG/0.5ML injection  Self, Child Yes No   albuterol (ACCUNEB) 1.25 MG/3ML nebulizer solution  Self, Child Yes No   Sig: Take 1.25 mg by nebulization 2 (two) times a day   albuterol (PROVENTIL HFA,VENTOLIN HFA) 90 mcg/act inhaler  Child, Self Yes No   Sig: Inhale 2 puffs every 4 (four) hours as needed   aspirin (ECOTRIN LOW STRENGTH) 81 mg EC tablet  Child, Self Yes No   Sig: Take 81 mg by mouth daily    atorvastatin (LIPITOR) 40 mg tablet  Child, Self Yes No   Sig: Take 40 mg by mouth daily   benzonatate (TESSALON) 200 MG capsule  Self, Child No No   Sig: Take 1 capsule (200 mg total) by mouth 3 (three) times a day   Patient not taking: Reported on 2024   clobetasol (TEMOVATE) 0.05 % ointment  Self, Child No No   Sig: Apply topically daily at bedtime   dextromethorphan-guaiFENesin (ROBITUSSIN-DM)  mg/5 mL oral liquid  Self, Child Yes No   Sig: Take 5 mL by mouth   fluticasone (FLONASE) 50 mcg/act nasal spray  Child, Self Yes No   Si spray into each nostril   guaiFENesin (MUCINEX) 600 mg 12 hr tablet  Self, Child No No   Sig: Take 1 tablet (600 mg total) by mouth 2 (two) times a day   Patient taking differently: Take 600 mg by mouth if needed   insulin aspart (NovoLOG) 100 units/mL injection  Self, Child Yes No   Sig: Per sliding scale   insulin glargine (LANTUS SOLOSTAR) 100 units/mL injection pen  Self, Child No No   Sig: Inject 15 Units under the skin daily at bedtime   ipratropium (ATROVENT) 0.06 % nasal spray  Child, Self No No   Si sprays into each nostril 2 (two) times a day   Patient taking differently: 2 sprays into each nostril if needed   levalbuterol (XOPENEX) 0.63 mg/3 mL nebulizer solution  Self, Child No No   Sig: Take 3 mL (0.63 mg total) by nebulization 2 (two) times a day   levothyroxine 100 mcg tablet  Child, Self Yes No   Sig: Take 100 mcg by mouth daily   lidocaine (XYLOCAINE) 5 % ointment  Self, Child No No   Sig: Apply topically 3 (three) times a day as needed for mild pain (for left shoulder and arm pain)   loratadine (CLARITIN) 10 mg tablet  Child, Self Yes No   Sig: Take 10 mg by mouth daily   oxybutynin (DITROPAN-XL) 10 MG 24 hr tablet  Child, Self Yes No   Sig: Take 10 mg by mouth daily   pantoprazole (PROTONIX) 40 mg tablet  Self, Child No No   Sig: Take 1 tablet (40 mg total) by mouth daily in the early morning Do not start before 2023.   Patient not  taking: Reported on 6/27/2024   polyethylene glycol (MIRALAX) 17 g packet  Self, Child Yes No   Sig: Take 17 g by mouth   predniSONE 1 mg tablet  Self, Child No No   Sig: Take 4 tablets (4 mg total) by mouth daily   predniSONE 5 mg tablet  Self, Child No No   Sig: Take 2 tablets (10 mg total) by mouth daily   predniSONE 5 mg tablet  Self, Child No No   Sig: Take 1 tablet (5 mg total) by mouth daily   psyllium (METAMUCIL) 0.52 g capsule  Self, Child Yes No   Sig: Take 0.52 g by mouth daily   valsartan (DIOVAN) 320 MG tablet  Child, Self Yes No      Facility-Administered Medications: None     Discharge Medication List as of 11/11/2024  8:14 PM        START taking these medications    Details   !! predniSONE 20 mg tablet Take 2 tablets (40 mg total) by mouth daily for 5 days, Starting Mon 11/11/2024, Until Sat 11/16/2024, Normal       !! - Potential duplicate medications found. Please discuss with provider.        CONTINUE these medications which have NOT CHANGED    Details   albuterol (ACCUNEB) 1.25 MG/3ML nebulizer solution Take 1.25 mg by nebulization 2 (two) times a day, Historical Med      albuterol (PROVENTIL HFA,VENTOLIN HFA) 90 mcg/act inhaler Inhale 2 puffs every 4 (four) hours as needed, Starting Tue 4/16/2019, Historical Med      aspirin (ECOTRIN LOW STRENGTH) 81 mg EC tablet Take 81 mg by mouth daily, Historical Med      atorvastatin (LIPITOR) 40 mg tablet Take 40 mg by mouth daily, Historical Med      BD PEN NEEDLE SKYLER U/F 32G X 4 MM MISC 1 SYRINGE BY MISCELLANEOUS ROUTE DAILY, Historical Med      benzonatate (TESSALON) 200 MG capsule Take 1 capsule (200 mg total) by mouth 3 (three) times a day, Starting Thu 11/16/2023, Normal      Calcium Carb-Cholecalciferol (CALTRATE 600+D3 SOFT PO) Take 2 tablets by mouth, Historical Med      clobetasol (TEMOVATE) 0.05 % ointment Apply topically daily at bedtime, Starting Thu 10/19/2023, Normal      dextromethorphan-guaiFENesin (ROBITUSSIN-DM)  mg/5 mL oral  liquid Take 5 mL by mouth, Historical Med      Dulaglutide 1.5 MG/0.5ML SOPN Inject 1.5 mg under the skin once a week, Historical Med      fluticasone (FLONASE) 50 mcg/act nasal spray 1 spray into each nostril, Historical Med      guaiFENesin (MUCINEX) 600 mg 12 hr tablet Take 1 tablet (600 mg total) by mouth 2 (two) times a day, Starting Thu 11/16/2023, No Print      insulin aspart (NovoLOG) 100 units/mL injection Per sliding scale, Historical Med      insulin glargine (LANTUS SOLOSTAR) 100 units/mL injection pen Inject 15 Units under the skin daily at bedtime, Starting Thu 11/16/2023, No Print      ipratropium (ATROVENT) 0.06 % nasal spray 2 sprays into each nostril 2 (two) times a day, Starting Wed 4/20/2022, Normal      JARDIANCE 10 MG TABS Take 10 mg by mouth daily, Starting Tue 4/16/2019, Historical Med      levalbuterol (XOPENEX) 0.63 mg/3 mL nebulizer solution Take 3 mL (0.63 mg total) by nebulization 2 (two) times a day, Starting Thu 11/16/2023, No Print      levothyroxine 100 mcg tablet Take 100 mcg by mouth daily, Historical Med      lidocaine (XYLOCAINE) 5 % ointment Apply topically 3 (three) times a day as needed for mild pain (for left shoulder and arm pain), Starting Mon 6/3/2024, Normal      loratadine (CLARITIN) 10 mg tablet Take 10 mg by mouth daily, Historical Med      Multiple Vitamin (multivitamin) tablet Take 1 tablet by mouth daily, Historical Med      Multiple Vitamins-Minerals (Bariatric Multivitamins/Iron) CAPS Take 1 tablet by mouth daily, Historical Med      ONE TOUCH ULTRA TEST test strip USE FOR BLOOD GLUCOSE TESTING AS DIRECTED TWICE DAILY, Historical Med      ONETOUCH DELICA LANCETS 33G MISC 1 SYRINGE BY MISCELLANEOUS ROUTE 2 TIMES A DAY AS NEEDED DX E11.9 LAST O/V 10/20/18, Historical Med      oxybutynin (DITROPAN-XL) 10 MG 24 hr tablet Take 10 mg by mouth daily, Starting Mon 4/22/2019, Historical Med      pantoprazole (PROTONIX) 40 mg tablet Take 1 tablet (40 mg total) by mouth  daily in the early morning Do not start before November 17, 2023., Starting Fri 11/17/2023, Normal      polyethylene glycol (MIRALAX) 17 g packet Take 17 g by mouth, Historical Med      !! predniSONE 1 mg tablet Take 4 tablets (4 mg total) by mouth daily, Starting Wed 7/24/2024, Normal      !! predniSONE 5 mg tablet Take 2 tablets (10 mg total) by mouth daily, Starting Thu 6/27/2024, Normal      !! predniSONE 5 mg tablet Take 1 tablet (5 mg total) by mouth daily, Starting Wed 7/24/2024, Normal      psyllium (METAMUCIL) 0.52 g capsule Take 0.52 g by mouth daily, Historical Med      Trelegy Ellipta 100-62.5-25 MCG/ACT inhaler Historical Med      Trelegy Ellipta 200-62.5-25 MCG/ACT AEPB inhaler INHALE 1 PUFF DAILY. RINSE MOUTH AFTER USE, Starting Mon 11/11/2024, Until Tue 3/11/2025, Normal      Trulicity 0.75 MG/0.5ML injection Historical Med      valsartan (DIOVAN) 320 MG tablet Starting Fri 11/12/2021, Historical Med       !! - Potential duplicate medications found. Please discuss with provider.        No discharge procedures on file.  ED SEPSIS DOCUMENTATION   Time reflects when diagnosis was documented in both MDM as applicable and the Disposition within this note       Time User Action Codes Description Comment    11/11/2024  7:41 PM Maikel Wahl [R06.02] SOB (shortness of breath)     11/11/2024  7:42 PM Maikel Wahl [J98.01] Bronchospasm     11/11/2024  7:42 PM Maikel Wahl Remove [J98.01] Bronchospasm     11/11/2024  7:54 PM Shira Vanessa [J45.21] Mild intermittent asthma with exacerbation                  Maikel Wahl MD  11/13/24 2430

## 2025-02-03 ENCOUNTER — OFFICE VISIT (OUTPATIENT)
Dept: PODIATRY | Facility: CLINIC | Age: OVER 89
End: 2025-02-03
Payer: MEDICARE

## 2025-02-03 VITALS — BODY MASS INDEX: 31.37 KG/M2 | HEART RATE: 100 BPM | OXYGEN SATURATION: 97 % | HEIGHT: 61 IN

## 2025-02-03 DIAGNOSIS — E11.9 CONTROLLED TYPE 2 DIABETES MELLITUS WITHOUT COMPLICATION, WITHOUT LONG-TERM CURRENT USE OF INSULIN (HCC): ICD-10-CM

## 2025-02-03 DIAGNOSIS — B35.1 ONYCHOMYCOSIS: Primary | ICD-10-CM

## 2025-02-03 DIAGNOSIS — I73.9 PERIPHERAL VASCULAR DISEASE, UNSPECIFIED (HCC): ICD-10-CM

## 2025-02-03 PROCEDURE — RECHECK: Performed by: PODIATRIST

## 2025-02-03 PROCEDURE — 11721 DEBRIDE NAIL 6 OR MORE: CPT | Performed by: PODIATRIST
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
## 2025-03-03 ENCOUNTER — APPOINTMENT (OUTPATIENT)
Dept: LAB | Facility: CLINIC | Age: OVER 89
End: 2025-03-03
Payer: MEDICARE

## 2025-03-03 DIAGNOSIS — Z79.4 ENCOUNTER FOR LONG-TERM (CURRENT) USE OF INSULIN (HCC): ICD-10-CM

## 2025-03-03 DIAGNOSIS — E78.5 HYPERLIPIDEMIA, UNSPECIFIED HYPERLIPIDEMIA TYPE: ICD-10-CM

## 2025-03-03 DIAGNOSIS — E03.9 HYPOTHYROIDISM, UNSPECIFIED TYPE: ICD-10-CM

## 2025-03-03 DIAGNOSIS — E10.59 TYPE 1 DIABETES MELLITUS WITH VASCULAR DISEASE (HCC): ICD-10-CM

## 2025-03-03 DIAGNOSIS — Z79.899 ENCOUNTER FOR LONG-TERM (CURRENT) USE OF MEDICATIONS: ICD-10-CM

## 2025-03-03 DIAGNOSIS — I10 ESSENTIAL HYPERTENSION, MALIGNANT: ICD-10-CM

## 2025-03-03 DIAGNOSIS — E11.65 INADEQUATELY CONTROLLED DIABETES MELLITUS (HCC): ICD-10-CM

## 2025-03-03 LAB
ALBUMIN SERPL BCG-MCNC: 4 G/DL (ref 3.5–5)
ALP SERPL-CCNC: 75 U/L (ref 34–104)
ALT SERPL W P-5'-P-CCNC: 11 U/L (ref 7–52)
ANION GAP SERPL CALCULATED.3IONS-SCNC: 7 MMOL/L (ref 4–13)
AST SERPL W P-5'-P-CCNC: 16 U/L (ref 13–39)
BASOPHILS # BLD AUTO: 0.05 THOUSANDS/ÂΜL (ref 0–0.1)
BASOPHILS NFR BLD AUTO: 1 % (ref 0–1)
BILIRUB SERPL-MCNC: 0.56 MG/DL (ref 0.2–1)
BUN SERPL-MCNC: 28 MG/DL (ref 5–25)
CALCIUM SERPL-MCNC: 9.8 MG/DL (ref 8.4–10.2)
CHLORIDE SERPL-SCNC: 102 MMOL/L (ref 96–108)
CHOLEST SERPL-MCNC: 133 MG/DL (ref ?–200)
CO2 SERPL-SCNC: 28 MMOL/L (ref 21–32)
CREAT SERPL-MCNC: 1.73 MG/DL (ref 0.6–1.3)
CREAT UR-MCNC: 21.4 MG/DL
EOSINOPHIL # BLD AUTO: 0.46 THOUSAND/ÂΜL (ref 0–0.61)
EOSINOPHIL NFR BLD AUTO: 7 % (ref 0–6)
ERYTHROCYTE [DISTWIDTH] IN BLOOD BY AUTOMATED COUNT: 14.8 % (ref 11.6–15.1)
GFR SERPL CREATININE-BSD FRML MDRD: 24 ML/MIN/1.73SQ M
GLUCOSE P FAST SERPL-MCNC: 129 MG/DL (ref 65–99)
HCT VFR BLD AUTO: 44.7 % (ref 34.8–46.1)
HDLC SERPL-MCNC: 66 MG/DL
HGB BLD-MCNC: 14.4 G/DL (ref 11.5–15.4)
IMM GRANULOCYTES # BLD AUTO: 0.03 THOUSAND/UL (ref 0–0.2)
IMM GRANULOCYTES NFR BLD AUTO: 1 % (ref 0–2)
LDLC SERPL CALC-MCNC: 50 MG/DL (ref 0–100)
LYMPHOCYTES # BLD AUTO: 2.13 THOUSANDS/ÂΜL (ref 0.6–4.47)
LYMPHOCYTES NFR BLD AUTO: 32 % (ref 14–44)
MCH RBC QN AUTO: 28.1 PG (ref 26.8–34.3)
MCHC RBC AUTO-ENTMCNC: 32.2 G/DL (ref 31.4–37.4)
MCV RBC AUTO: 87 FL (ref 82–98)
MICROALBUMIN UR-MCNC: 54.2 MG/L
MICROALBUMIN/CREAT 24H UR: 253 MG/G CREATININE (ref 0–30)
MONOCYTES # BLD AUTO: 0.62 THOUSAND/ÂΜL (ref 0.17–1.22)
MONOCYTES NFR BLD AUTO: 9 % (ref 4–12)
NEUTROPHILS # BLD AUTO: 3.35 THOUSANDS/ÂΜL (ref 1.85–7.62)
NEUTS SEG NFR BLD AUTO: 50 % (ref 43–75)
NONHDLC SERPL-MCNC: 67 MG/DL
NRBC BLD AUTO-RTO: 0 /100 WBCS
PLATELET # BLD AUTO: 195 THOUSANDS/UL (ref 149–390)
PMV BLD AUTO: 10.1 FL (ref 8.9–12.7)
POTASSIUM SERPL-SCNC: 4.6 MMOL/L (ref 3.5–5.3)
PROT SERPL-MCNC: 7.7 G/DL (ref 6.4–8.4)
RBC # BLD AUTO: 5.13 MILLION/UL (ref 3.81–5.12)
SODIUM SERPL-SCNC: 137 MMOL/L (ref 135–147)
TRIGL SERPL-MCNC: 84 MG/DL (ref ?–150)
WBC # BLD AUTO: 6.64 THOUSAND/UL (ref 4.31–10.16)

## 2025-03-03 PROCEDURE — 80061 LIPID PANEL: CPT

## 2025-03-03 PROCEDURE — 82043 UR ALBUMIN QUANTITATIVE: CPT

## 2025-03-03 PROCEDURE — 83036 HEMOGLOBIN GLYCOSYLATED A1C: CPT

## 2025-03-03 PROCEDURE — 85025 COMPLETE CBC W/AUTO DIFF WBC: CPT

## 2025-03-03 PROCEDURE — 84443 ASSAY THYROID STIM HORMONE: CPT

## 2025-03-03 PROCEDURE — 82570 ASSAY OF URINE CREATININE: CPT

## 2025-03-03 PROCEDURE — 80053 COMPREHEN METABOLIC PANEL: CPT

## 2025-03-03 PROCEDURE — 36415 COLL VENOUS BLD VENIPUNCTURE: CPT

## 2025-03-04 LAB
EST. AVERAGE GLUCOSE BLD GHB EST-MCNC: 171 MG/DL
HBA1C MFR BLD: 7.6 %

## 2025-03-05 LAB — MISCELLANEOUS LAB TEST RESULT: NORMAL

## 2025-03-26 ENCOUNTER — TELEPHONE (OUTPATIENT)
Dept: OBGYN CLINIC | Facility: HOSPITAL | Age: OVER 89
End: 2025-03-26

## 2025-03-26 ENCOUNTER — OFFICE VISIT (OUTPATIENT)
Dept: OBGYN CLINIC | Facility: HOSPITAL | Age: OVER 89
End: 2025-03-26
Payer: MEDICARE

## 2025-03-26 ENCOUNTER — HOSPITAL ENCOUNTER (OUTPATIENT)
Dept: RADIOLOGY | Facility: HOSPITAL | Age: OVER 89
Discharge: HOME | End: 2025-03-26
Attending: ORTHOPAEDIC SURGERY
Payer: MEDICARE

## 2025-03-26 DIAGNOSIS — R52 PAIN: ICD-10-CM

## 2025-03-26 DIAGNOSIS — M65.4 TENOSYNOVITIS, DE QUERVAIN: Primary | ICD-10-CM

## 2025-03-26 PROCEDURE — 20550 NJX 1 TENDON SHEATH/LIGAMENT: CPT | Performed by: ORTHOPAEDIC SURGERY

## 2025-03-26 PROCEDURE — 99203 OFFICE O/P NEW LOW 30 MIN: CPT | Performed by: ORTHOPAEDIC SURGERY

## 2025-03-26 PROCEDURE — 73110 X-RAY EXAM OF WRIST: CPT

## 2025-03-26 RX ORDER — BETAMETHASONE SODIUM PHOSPHATE AND BETAMETHASONE ACETATE 3; 3 MG/ML; MG/ML
6 INJECTION, SUSPENSION INTRA-ARTICULAR; INTRALESIONAL; INTRAMUSCULAR; SOFT TISSUE
Status: COMPLETED | OUTPATIENT
Start: 2025-03-26 | End: 2025-03-26

## 2025-03-26 RX ORDER — LIDOCAINE HYDROCHLORIDE 5 MG/ML
1 INJECTION, SOLUTION INFILTRATION; PERINEURAL
Status: COMPLETED | OUTPATIENT
Start: 2025-03-26 | End: 2025-03-26

## 2025-03-26 RX ADMIN — LIDOCAINE HYDROCHLORIDE 1 ML: 5 INJECTION, SOLUTION INFILTRATION; PERINEURAL at 13:30

## 2025-03-26 RX ADMIN — BETAMETHASONE SODIUM PHOSPHATE AND BETAMETHASONE ACETATE 6 MG: 3; 3 INJECTION, SUSPENSION INTRA-ARTICULAR; INTRALESIONAL; INTRAMUSCULAR; SOFT TISSUE at 13:30

## 2025-03-26 NOTE — TELEPHONE ENCOUNTER
Patient saw Dr Hilario today. He would like her back in 6 weeks. Please call patient and help with an appointment.    Thank you

## 2025-03-26 NOTE — PROGRESS NOTES
ORTHOPAEDIC HAND, WRIST, AND ELBOW OFFICE  VISIT     Name: Leigh Ann Moreno      : 1928      MRN: [id number]  Encounter Provider: Dileep Hilario MD  Encounter Date: 3/26/2025   Encounter department: [facility] [place]  :  Assessment & Plan  Tenosynovitis, de Quervain  The patient has an examination consistent with right deQuervain's tenosynovitis   I have discussed with the patient the pathophysiology of this diagnosis and reviewed how the examination correlates with this diagnosis.  Treatment options were discussed at length and after discussing these treatment options, the patient elected for CS injection.   Follow up 6 weeks for follow up.   Orders:  •  XR wrist 3+ vw right; Future  •  Hand/upper extremity injection: R extensor compartment 1  •  lidocaine (XYLOCAINE) 0.5 % injection 1 mL  •  betamethasone acetate-betamethasone sodium phosphate (CELESTONE) injection 6 mg          General Discussions:  De Quervain Tenosynovitis: The anatomy and physiology of de Quervain's tenosynovitis was discussed with the patient today in the office.  Edema and increased contact pressure within the first dorsal extensor compartment at the radial styloid can cause pain, crepitation, and limitation of function.  Treatment options include resting thumb spica splints to decrease edema, oral anti-inflammatory medications, home or formal therapy exercises, up to 2 steroid injections within the first dorsal extensor compartment, or surgical release.  While majority of patients do respond to conservative treatment, up to 20% may require surgical release.       History of Present Illness   HPI  Chief Complaint   Patient presents with   • Right Wrist - Pain       Leigh Ann Moreno is a 89 y.o. female who presents today for evaluation of left wrist pain.       REVIEW OF SYSTEMS:  General: no fever, no chills  HEENT:  No loss of hearing or eyesight problems  Eyes:  No red eyes  Respiratory:  No  coughing, shortness of breath or wheezing  Cardiovascular:  No chest pain, no palpitations  GI:  Abdomen soft nontender, denies nausea  Endocrine:  No muscle weakness, no frequent urination, no excessive thirst  Urinary:  No dysuria, no incontinence  Musculoskeletal: see HPI and PE  SKIN:  No skin rash, no dry skin  Neurological:  No headaches, no confusion  Psychiatric:  No suicide thoughts, no anxiety, no depression  Review of all other systems is negative    Objective   There were no vitals taken for this visit.     General: well developed and well nourished, alert, oriented times 3, and appears comfortable  Psychiatric: Normal  HEENT: Trachea Midline, No torticollis  Cardiovascular: No discernable arrhythmia  Pulmonary: No wheezing or stridor  Abdomen: No rebound or guarding  Extremities: No peripheral edema  Skin: No masses, erythema, lacerations, fluctation, ulcerations  Neurovascular: Sensation Intact to the Median, Ulnar, Radial Nerve, Motor Intact to the Median, Ulnar, Radial Nerve, and Pulses Intact    Musculoskeletal exam:  Right wrist  Positive tender to palpation over 1st dorsal extensor compartment and CMC joint  Positive palpable nodule  Positive crepitus over 1st dorsal extensor compartment   Positive Finkelstein's test        STUDIES REVIEWED:  Images were reviewed in PACS and demonstrate: 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  Patient understanding: patient states understanding of the procedure being performed  Site marked: the operative site was marked  Patient identity confirmed: verbally with patient  Supporting Documentation  Indications: pain   Procedure Details  Condition:de Quervain's tenosynovitis Site: R extensor compartment 1   Needle size: 25 G  Ultrasound guidance: no  Approach:  radial  Medications administered: 6 mg betamethasone acetate-betamethasone sodium phosphate 6 (3-3) mg/mL; 1 mL lidocaine 0.5 %  Patient tolerance: patient tolerated the procedure well with no immediate complications  Dressing:  Sterile dressing applied           Scribe Attestation    I,:  Yamila Avendano MA am acting as a scribe while in the presence of the attending physician.:       I,:  Dileep Hilario MD personally performed the services described in this documentation    as scribed in my presence.:

## 2025-04-07 ENCOUNTER — OFFICE VISIT (OUTPATIENT)
Dept: OBGYN CLINIC | Facility: CLINIC | Age: OVER 89
End: 2025-04-07
Payer: MEDICARE

## 2025-04-07 VITALS
HEIGHT: 61 IN | DIASTOLIC BLOOD PRESSURE: 82 MMHG | WEIGHT: 160 LBS | BODY MASS INDEX: 30.21 KG/M2 | SYSTOLIC BLOOD PRESSURE: 152 MMHG

## 2025-04-07 DIAGNOSIS — B37.31 VULVOVAGINAL CANDIDIASIS: Primary | ICD-10-CM

## 2025-04-07 PROCEDURE — 99213 OFFICE O/P EST LOW 20 MIN: CPT | Performed by: OBSTETRICS & GYNECOLOGY

## 2025-04-07 RX ORDER — FLUCONAZOLE 150 MG/1
150 TABLET ORAL
Qty: 2 TABLET | Refills: 0 | Status: SHIPPED | OUTPATIENT
Start: 2025-04-07 | End: 2025-04-11

## 2025-04-07 RX ORDER — NYSTATIN 100000 U/G
OINTMENT TOPICAL 2 TIMES DAILY
Qty: 30 G | Refills: 0 | Status: SHIPPED | OUTPATIENT
Start: 2025-04-07

## 2025-04-07 NOTE — PROGRESS NOTES
Assessment/Plan    Diagnoses and all orders for this visit:    Vulvovaginal candidiasis  -     fluconazole (DIFLUCAN) 150 mg tablet; Take 1 tablet (150 mg total) by mouth every 3 (three) days for 2 doses  -     nystatin (MYCOSTATIN) ointment; Apply topically 2 (two) times a day              Subjective  Chief Complaint   Patient presents with    Vaginitis     Pt c/o vaginal itching and bi         Leigh Ann Moreno is a 89 y.o.  female here for a problem visit.  She c/o vulvar itching. It hs been going on for about a month. No bleeding. She does have incontinence, wears pads/diapers. She is not sexually active.     Patient Active Problem List   Diagnosis    Hyperlipidemia    DM2 (diabetes mellitus, type 2) ([facility])    Essential hypertension    Mild renal insufficiency    Hypothyroidism    Cognitive impairment    Ambulatory dysfunction    Physical deconditioning    Constipation    Acute kidney injury superimposed on chronic kidney disease  ([facility])    Lumbar radiculopathy    DDD (degenerative disc disease), lumbar    Spinal stenosis of lumbar region    Diabetes mellitus with peripheral artery disease ([facility])    Carotid stenosis, asymptomatic, left    COPD (chronic obstructive pulmonary disease) with chronic bronchitis ([facility])    Moderate persistent asthma with status asthmaticus    Osteopenia of multiple sites    Uncontrolled type 2 diabetes mellitus with chronic kidney disease    Lichen sclerosus    Chronic cough    OA (osteoarthritis)    Asthma    Peripheral vascular disease (HCC)    Sickle cell trait ([facility])    CKD (chronic kidney disease)    Chest pain         Gynecologic History  No LMP recorded. Patient has had a hysterectomy.      Obstetric History  OB History    Para Term  AB Living   3 3 3  0 1   SAB IAB Ectopic Multiple Live Births       3      # Outcome Date GA Lbr Aric/2nd Weight Sex Type Anes PTL Lv   3 Term     M Vag-Spont   DEC   2 Term     M Vag-Spont   DEC   1 Term     F Vag-Spont   EDY        The following portions of the patient's history were reviewed and updated as appropriate: allergies, current medications, past family history, past medical history, past social history, past surgical history, and problem list.    Allergies  Penicillins, Ace inhibitors, Pollen extract, and Sulfa antibiotics    Medications    Current Outpatient Medications:     albuterol (ACCUNEB) 1.25 MG/3ML nebulizer solution, Take 1.25 mg by nebulization 2 (two) times a day, Disp: , Rfl:     albuterol (PROVENTIL HFA,VENTOLIN HFA) 90 mcg/act inhaler, Inhale 2 puffs every 4 (four) hours as needed, Disp: , Rfl: 0    aspirin (ECOTRIN LOW STRENGTH) 81 mg EC tablet, Take 81 mg by mouth daily, Disp: , Rfl:     atorvastatin (LIPITOR) 40 mg tablet, Take 40 mg by mouth daily, Disp: , Rfl:     BD PEN NEEDLE SKYLER U/F 32G X 4 MM MISC, 1 SYRINGE BY MISCELLANEOUS ROUTE DAILY, Disp: , Rfl: 0    clobetasol (TEMOVATE) 0.05 % ointment, Apply topically daily at bedtime, Disp: 45 g, Rfl: 3    Dulaglutide 1.5 MG/0.5ML SOPN, Inject 1.5 mg under the skin once a week, Disp: , Rfl:     fluconazole (DIFLUCAN) 150 mg tablet, Take 1 tablet (150 mg total) by mouth every 3 (three) days for 2 doses, Disp: 2 tablet, Rfl: 0    fluticasone (FLONASE) 50 mcg/act nasal spray, 1 spray into each nostril, Disp: , Rfl:     insulin aspart (NovoLOG) 100 units/mL injection, Per sliding scale, Disp: , Rfl:     insulin glargine (LANTUS SOLOSTAR) 100 units/mL injection pen, Inject 15 Units under the skin daily at bedtime, Disp: 15 mL, Rfl: 0    ipratropium (ATROVENT) 0.06 % nasal spray, 2 sprays into each nostril 2 (two) times a day, Disp: 15 mL, Rfl: 3    JARDIANCE 10 MG TABS, Take 10 mg by mouth daily, Disp: , Rfl: 0    levalbuterol (XOPENEX) 0.63 mg/3 mL nebulizer solution, Take 3 mL (0.63 mg total) by nebulization 2 (two) times a day, Disp: , Rfl:     levothyroxine 100 mcg tablet, Take 100 mcg by mouth daily, Disp: , Rfl:     lidocaine (XYLOCAINE) 5 % ointment, Apply  topically 3 (three) times a day as needed for mild pain (for left shoulder and arm pain), Disp: 50 g, Rfl: 2    loratadine (CLARITIN) 10 mg tablet, Take 10 mg by mouth daily, Disp: , Rfl:     Multiple Vitamin (multivitamin) tablet, Take 1 tablet by mouth daily, Disp: , Rfl:     nystatin (MYCOSTATIN) ointment, Apply topically 2 (two) times a day, Disp: 30 g, Rfl: 0    ONE TOUCH ULTRA TEST test strip, , Disp: , Rfl: 1    ONETOUCH DELICA LANCETS 33G MISC, , Disp: , Rfl: 1    oxybutynin (DITROPAN-XL) 10 MG 24 hr tablet, Take 10 mg by mouth daily, Disp: , Rfl: 0    Trulicity 0.75 MG/0.5ML injection, , Disp: , Rfl:     valsartan (DIOVAN) 320 MG tablet, , Disp: , Rfl:     benzonatate (TESSALON) 200 MG capsule, Take 1 capsule (200 mg total) by mouth 3 (three) times a day (Patient not taking: Reported on 6/27/2024), Disp: 90 capsule, Rfl: 0    dextromethorphan-guaiFENesin (ROBITUSSIN-DM)  mg/5 mL oral liquid, Take 5 mL by mouth (Patient not taking: Reported on 4/7/2025), Disp: , Rfl:     guaiFENesin (MUCINEX) 600 mg 12 hr tablet, Take 1 tablet (600 mg total) by mouth 2 (two) times a day (Patient not taking: Reported on 4/7/2025), Disp: , Rfl: 0    Multiple Vitamins-Minerals (Bariatric Multivitamins/Iron) CAPS, Take 1 tablet by mouth daily (Patient not taking: Reported on 4/7/2025), Disp: , Rfl:     pantoprazole (PROTONIX) 40 mg tablet, Take 1 tablet (40 mg total) by mouth daily in the early morning Do not start before November 17, 2023. (Patient not taking: Reported on 6/27/2024), Disp: 30 tablet, Rfl: 0    predniSONE 1 mg tablet, Take 4 tablets (4 mg total) by mouth daily (Patient not taking: Reported on 4/7/2025), Disp: 120 tablet, Rfl: 6    predniSONE 5 mg tablet, Take 2 tablets (10 mg total) by mouth daily (Patient not taking: Reported on 4/7/2025), Disp: 30 tablet, Rfl: 11    predniSONE 5 mg tablet, Take 1 tablet (5 mg total) by mouth daily (Patient not taking: Reported on 4/7/2025), Disp: 30 tablet, Rfl: 11     "psyllium (METAMUCIL) 0.52 g capsule, Take 0.52 g by mouth daily (Patient not taking: Reported on 4/7/2025), Disp: , Rfl:     Trelegy Ellipta 100-62.5-25 MCG/ACT inhaler, , Disp: , Rfl:     Trelegy Ellipta 200-62.5-25 MCG/ACT AEPB inhaler, INHALE 1 PUFF DAILY. RINSE MOUTH AFTER USE (Patient not taking: Reported on 4/7/2025), Disp: 60 each, Rfl: 3      Review of Systems  Review of Systems   Gastrointestinal:  Negative for constipation and diarrhea.   Genitourinary:  Positive for difficulty urinating (incontinence), vaginal discharge and vaginal pain (itch). Negative for vaginal bleeding.          Objective     /82 (BP Location: Left arm, Patient Position: Sitting, Cuff Size: Standard)   Ht 5' 1\" (1.549 m)   Wt 72.6 kg (160 lb)   BMI 30.23 kg/m²       Physical Exam  Constitutional:       General: She is not in acute distress.     Appearance: Normal appearance. She is well-developed. She is not ill-appearing.   Genitourinary:      Right Labia: tenderness and skin changes.      Left Labia: tenderness and skin changes.              No vaginal bleeding.      Severe vaginal atrophy present.  Pulmonary:      Effort: Pulmonary effort is normal. No respiratory distress.   Neurological:      General: No focal deficit present.      Mental Status: She is alert and oriented to person, place, and time.   Psychiatric:         Mood and Affect: Mood normal.         Behavior: Behavior normal.         Thought Content: Thought content normal.         Judgment: Judgment normal.   Vitals and nursing note reviewed.               "

## 2025-04-08 ENCOUNTER — OFFICE VISIT (OUTPATIENT)
Dept: RHEUMATOLOGY | Facility: CLINIC | Age: OVER 89
End: 2025-04-08
Payer: MEDICARE

## 2025-04-08 VITALS — DIASTOLIC BLOOD PRESSURE: 68 MMHG | HEIGHT: 61 IN | BODY MASS INDEX: 30.23 KG/M2 | SYSTOLIC BLOOD PRESSURE: 138 MMHG

## 2025-04-08 DIAGNOSIS — Z79.52 CURRENT CHRONIC USE OF SYSTEMIC STEROIDS: ICD-10-CM

## 2025-04-08 DIAGNOSIS — M35.3 PMR (POLYMYALGIA RHEUMATICA) (HCC): Primary | ICD-10-CM

## 2025-04-08 DIAGNOSIS — M81.0 AGE-RELATED OSTEOPOROSIS WITHOUT CURRENT PATHOLOGICAL FRACTURE: ICD-10-CM

## 2025-04-08 PROCEDURE — G2211 COMPLEX E/M VISIT ADD ON: HCPCS | Performed by: INTERNAL MEDICINE

## 2025-04-08 PROCEDURE — 99215 OFFICE O/P EST HI 40 MIN: CPT | Performed by: INTERNAL MEDICINE

## 2025-04-08 RX ORDER — PREDNISONE 5 MG/1
5 TABLET ORAL DAILY
Qty: 30 TABLET | Refills: 11 | Status: SHIPPED | OUTPATIENT
Start: 2025-04-08

## 2025-04-08 NOTE — PROGRESS NOTES
Name: Leigh Ann Moreno      : 1928      MRN: [id number]  Encounter Provider: Monty Bueno MD  Encounter Date: 2025   Encounter department: [facility] RHEUMATOLOGY [facility]  :  Assessment & Plan  PMR (polymyalgia rheumatica) (HCC)  PMR  Diabetes mellitus  Neuropathy  Osteoarthritis  CKD (solitary kidney)     Plans:     Leigh Ann Moreno is a 89 y/o female with clinical s/s PMR     PMR has tapered off prednisone but has mild LE symptoms    Resume 5 mg X 4 weeks and then d/c (she would like to taper sooner solis to DM)     At this time no GCA symptoms     Reviewed labs, RF, CCP negative. ESR and CRP elevated     RTC 4 months     Orders:    predniSONE 5 mg tablet; Take 1 tablet (5 mg total) by mouth daily    DXA bone density spine hip and pelvis; Future    Current chronic use of systemic steroids  Check DXA  We have reviewed Prolia (she has h/o CKD)  Orders:    predniSONE 5 mg tablet; Take 1 tablet (5 mg total) by mouth daily    DXA bone density spine hip and pelvis; Future    Age-related osteoporosis without current pathological fracture    Orders:    predniSONE 5 mg tablet; Take 1 tablet (5 mg total) by mouth daily    DXA bone density spine hip and pelvis; Future        History of Present Illness   HPI  Leigh Ann Moreno is a 89 y.o. female who presents for further f/u PMR, osteoarthritis  History obtained from: patient and daughter    She has weaned off prednisone but has mild stiffness b/l thighs and shoulders    Also had 1st [facility] OA injection per Orthopedics (Dr Hilario)     Review of Systems  Pertinent Medical History     Doing well on 10 mg prednisone. B/l shoulder pain better, pain and stiffness small joints hands and wrists improved.      She has past medical history diabetes mellitus, HTN, HLD, neuropathy, asthma gout     Denies headaches, jaw claudication, double or blurry vision     No rashes, Raynaud's or  sicca    --------------------------------------------------------------------------------------------------------        ROS:        - for: Fevers, Chills or sweats.  No HAs or scalp tenderness.  No jaw claudication.  No acute visual or eye changes.  No dry eyes.  No auditory complaints.  No oral lesions or ulcers.  No dry mouth.  No sore throat or cough.  No chest pains or palpitations.  No shortness of breath, dyspnea on exertion or wheezing.  No hemotpysis.  No abdominal pain, GERD symptoms, diarrhea or constipation.  No urinary complaints.  No numbness, tingling or weakness.  No rashes.    All other ROS was reviewed and negative except as above         --------------------------------------------------------------------------------------------------------    Past Medical History    Past Medical History:   Diagnosis Date    Arthritis     Asthma     COPD (chronic obstructive pulmonary disease) ([facility])     Diabetes mellitus ([facility])     Difficulty walking     Gout     Hyperlipidemia     Hypertension     Hypothyroidism     Neuropathy in diabetes ([facility])     Overactive bladder     PVD (peripheral vascular disease) ([facility])     Verruca            Past Surgical History    Past Surgical History:   Procedure Laterality Date    ARTERIAL BYPASS SURGERY      CAROTID ENDARTERECTOMY Right     CATARACT EXTRACTION      HYSTERECTOMY      NEPHRECTOMY      in her 20s           Family History    Family History   Problem Relation Age of Onset    Asthma Mother     Asthma Father     Diabetes Father     Arthritis Father     Hypertension Father     Lung cancer Sister     Early death Brother 36    COPD Daughter     Asthma Daughter     Rheum arthritis Daughter     Cataracts Daughter     Liver cancer Son     Alzheimer's disease Sister     Rectal cancer Sister     Thyroid cancer Sister     No Known Problems Sister     Asthma Son             Social History    Social History     Tobacco Use    Smoking status: Former     Current packs/day: 0.00      Average packs/day: 1 pack/day for 25.0 years (25.0 ttl pk-yrs)     Types: Cigarettes     Start date:      Quit date:      Years since quittin.3    Smokeless tobacco: Former   Vaping Use    Vaping status: Never Used   Substance Use Topics    Alcohol use: Not Currently    Drug use: Never         Allergies    Allergies   Allergen Reactions    Penicillins Hives    Ace Inhibitors      Other reaction(s): cough    Pollen Extract Sneezing     sneezing    Sulfa Antibiotics      Unknown reaction         Medications    Current Outpatient Medications   Medication Instructions    albuterol (ACCUNEB) 1.25 mg, 2 times daily    albuterol (PROVENTIL HFA,VENTOLIN HFA) 90 mcg/act inhaler 2 puffs, Every 4 hours PRN    aspirin (ECOTRIN LOW STRENGTH) 81 mg, Daily    atorvastatin (LIPITOR) 40 mg, Daily    BD PEN NEEDLE SKYLER U/F 32G X 4 MM MISC 1 SYRINGE BY MISCELLANEOUS ROUTE DAILY    benzonatate (TESSALON) 200 mg, Oral, 3 times daily    clobetasol (TEMOVATE) 0.05 % ointment Topical, Daily at bedtime    dextromethorphan-guaiFENesin (ROBITUSSIN-DM)  mg/5 mL oral liquid 5 mL    dulaglutide (TRULICITY) 1.5 mg, Weekly    fluconazole (DIFLUCAN) 150 mg, Oral, Every 3 days    fluticasone (FLONASE) 50 mcg/act nasal spray 1 spray    guaiFENesin (MUCINEX) 600 mg, Oral, 2 times daily    insulin aspart (NovoLOG) 100 units/mL injection Per sliding scale    insulin glargine (LANTUS SOLOSTAR) 15 Units, Subcutaneous, Daily at bedtime    ipratropium (ATROVENT) 0.06 % nasal spray 2 sprays, Nasal, 2 times daily    Jardiance 10 mg, Daily    levalbuterol (XOPENEX) 0.63 mg, Nebulization, 2 times daily    levothyroxine 100 mcg, Daily    lidocaine (XYLOCAINE) 5 % ointment Topical, 3 times daily PRN    loratadine (CLARITIN) 10 mg, Daily    Multiple Vitamin (multivitamin) tablet 1 tablet, Daily    Multiple Vitamins-Minerals (Bariatric Multivitamins/Iron) CAPS 1 tablet, Daily    nystatin (MYCOSTATIN) ointment Topical, 2 times daily    ONE TOUCH  "ULTRA TEST test strip     ONETOUCH DELICA LANCETS 33G MISC     oxybutynin (DITROPAN-XL) 10 mg, Daily    pantoprazole (PROTONIX) 40 mg, Oral, Daily (early morning)    predniSONE 10 mg, Oral, Daily    predniSONE 4 mg, Oral, Daily    predniSONE 5 mg, Oral, Daily    psyllium (METAMUCIL) 0.52 g, Daily    Trelegy Ellipta 100-62.5-25 MCG/ACT inhaler     Trelegy Ellipta 200-62.5-25 MCG/ACT AEPB inhaler 1 puff, Inhalation, Daily, Rinse mouth after use.    Trulicity 0.75 MG/0.5ML injection     valsartan (DIOVAN) 320 MG tablet No dose, route, or frequency recorded.          ________________________________________________________________________    Results Review    Component      Latest Ref Rng 6/27/2024   CYCLIC CITRULLINATED PEPTIDE ANTIBODY      See comment  1.3    C-REACTIVE PROTEIN      <3.0 mg/L 6.1 (H)    HEP C AB      Non-Reactive  Non-reactive       Legend:  (H) High               Objective   /68   Ht 5' 1\" (1.549 m)   BMI 30.23 kg/m²      Physical Exam    GEN: AAO, No apparent distress.  Patient is well developed.  HEENT:  Pupils are equal, round and reactive.  Sclera are clear.  Fundoscopic exam is normal.  External ears are without lesions.  Oral pharynx is clear of ulcers or other lesions.  MMM.   NECK:  Supple.  There is no adenopathy appreciable in anterior or posterior cervical chains or supraclavicularly.  JVP is normal.    HEART: Regular rate and rhythm.  There is no appreciable murmur, gallop or rub.  LUNGS: Clear to auscultation.  ABD:  Soft, without tenderness, rebound or guarding.  No appreciable organomegally.  NEURO: Speech and cognition are normal.  Strength is 5/5 throughout.  Tone is normal.  DTRs are 2/4 at the knees, ankles and elbows.  Gait is normal.  SKIN: There are no rashes or lesions    MUSCULOSKELETAL:   No synovitis noted    Thank you for involving me in this patient's care.        Monty Bueno MD  [facility] Rheumatology        I have spent a total time of 46 minutes in caring for " this patient on the day of the visit/encounter including Diagnostic results, Risks and benefits of tx options, Risk factor reductions, Impressions, Counseling / Coordination of care, Documenting in the medical record, Reviewing/placing orders in the medical record (including tests, medications, and/or procedures), and Obtaining or reviewing history  .

## 2025-05-07 ENCOUNTER — PROCEDURE VISIT (OUTPATIENT)
Dept: PODIATRY | Facility: CLINIC | Age: OVER 89
End: 2025-05-07
Payer: MEDICARE

## 2025-05-07 VITALS — OXYGEN SATURATION: 98 % | HEART RATE: 109 BPM | BODY MASS INDEX: 30.23 KG/M2 | HEIGHT: 61 IN

## 2025-05-07 DIAGNOSIS — I73.9 PERIPHERAL VASCULAR DISEASE, UNSPECIFIED (HCC): ICD-10-CM

## 2025-05-07 DIAGNOSIS — B35.1 ONYCHOMYCOSIS: Primary | ICD-10-CM

## 2025-05-07 DIAGNOSIS — E11.9 CONTROLLED TYPE 2 DIABETES MELLITUS WITHOUT COMPLICATION, WITHOUT LONG-TERM CURRENT USE OF INSULIN (HCC): ICD-10-CM

## 2025-05-07 PROCEDURE — 11721 DEBRIDE NAIL 6 OR MORE: CPT | Performed by: PODIATRIST

## 2025-05-07 RX ORDER — DULAGLUTIDE 1.5 MG/.5ML
1.5 INJECTION, SOLUTION SUBCUTANEOUS WEEKLY
COMMUNITY
Start: 2025-05-02
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
## 2025-05-13 ENCOUNTER — TELEPHONE (OUTPATIENT)
Dept: PODIATRY | Facility: CLINIC | Age: OVER 89
End: 2025-05-13

## 2025-05-13 NOTE — TELEPHONE ENCOUNTER
Caller: [facility]    Doctor/Office: Dr Pedrito Orellana    CB#: [phone]      What needs to be faxed: Signed treatment notes from 5/7/25    ATTN to: [facility]    Fax#:  [phone]

## 2025-05-28 ENCOUNTER — APPOINTMENT (OUTPATIENT)
Dept: LAB | Facility: CLINIC | Age: OVER 89
End: 2025-05-28
Attending: INTERNAL MEDICINE
Payer: MEDICARE

## 2025-05-28 DIAGNOSIS — D63.1 ANEMIA OF CHRONIC RENAL FAILURE, UNSPECIFIED CKD STAGE: ICD-10-CM

## 2025-05-28 DIAGNOSIS — N18.9 ANEMIA OF CHRONIC RENAL FAILURE, UNSPECIFIED CKD STAGE: ICD-10-CM

## 2025-05-28 DIAGNOSIS — E11.69 TYPE 2 DIABETES MELLITUS WITH OTHER SPECIFIED COMPLICATION, UNSPECIFIED WHETHER LONG TERM INSULIN USE (HCC): ICD-10-CM

## 2025-05-28 DIAGNOSIS — I10 ESSENTIAL HYPERTENSION, MALIGNANT: ICD-10-CM

## 2025-05-28 DIAGNOSIS — N18.32 CHRONIC KIDNEY DISEASE (CKD) STAGE G3B/A1, MODERATELY DECREASED GLOMERULAR FILTRATION RATE (GFR) BETWEEN 30-44 ML/MIN/1.73 SQUARE METER AND ALBUMINURIA CREATININE RATIO LESS THAN 30 MG/G (HCC): ICD-10-CM

## 2025-05-28 DIAGNOSIS — E11.29 TYPE 2 DIABETES MELLITUS WITH OTHER KIDNEY COMPLICATION, UNSPECIFIED WHETHER LONG TERM INSULIN USE (HCC): ICD-10-CM

## 2025-05-28 LAB
ALBUMIN SERPL BCG-MCNC: 3.8 G/DL (ref 3.5–5)
ALP SERPL-CCNC: 69 U/L (ref 34–104)
ALT SERPL W P-5'-P-CCNC: 14 U/L (ref 7–52)
ANION GAP SERPL CALCULATED.3IONS-SCNC: 5 MMOL/L (ref 4–13)
AST SERPL W P-5'-P-CCNC: 19 U/L (ref 13–39)
BILIRUB SERPL-MCNC: 0.58 MG/DL (ref 0.2–1)
BUN SERPL-MCNC: 35 MG/DL (ref 5–25)
CALCIUM SERPL-MCNC: 9.5 MG/DL (ref 8.4–10.2)
CHLORIDE SERPL-SCNC: 106 MMOL/L (ref 96–108)
CO2 SERPL-SCNC: 29 MMOL/L (ref 21–32)
CREAT SERPL-MCNC: 1.81 MG/DL (ref 0.6–1.3)
ERYTHROCYTE [DISTWIDTH] IN BLOOD BY AUTOMATED COUNT: 14.6 % (ref 11.6–15.1)
GFR SERPL CREATININE-BSD FRML MDRD: 23 ML/MIN/1.73SQ M
GLUCOSE P FAST SERPL-MCNC: 109 MG/DL (ref 65–99)
HCT VFR BLD AUTO: 43.7 % (ref 34.8–46.1)
HGB BLD-MCNC: 14.3 G/DL (ref 11.5–15.4)
MAGNESIUM SERPL-MCNC: 2.4 MG/DL (ref 1.9–2.7)
MCH RBC QN AUTO: 28.5 PG (ref 26.8–34.3)
MCHC RBC AUTO-ENTMCNC: 32.7 G/DL (ref 31.4–37.4)
MCV RBC AUTO: 87 FL (ref 82–98)
PHOSPHATE SERPL-MCNC: 3.3 MG/DL (ref 2.3–4.1)
PLATELET # BLD AUTO: 179 THOUSANDS/UL (ref 149–390)
PMV BLD AUTO: 9.9 FL (ref 8.9–12.7)
POTASSIUM SERPL-SCNC: 4.7 MMOL/L (ref 3.5–5.3)
PROT SERPL-MCNC: 7.2 G/DL (ref 6.4–8.4)
RBC # BLD AUTO: 5.02 MILLION/UL (ref 3.81–5.12)
SODIUM SERPL-SCNC: 140 MMOL/L (ref 135–147)
URATE SERPL-MCNC: 5.5 MG/DL (ref 2–7.5)
WBC # BLD AUTO: 7.03 THOUSAND/UL (ref 4.31–10.16)

## 2025-05-28 PROCEDURE — 36415 COLL VENOUS BLD VENIPUNCTURE: CPT

## 2025-05-28 PROCEDURE — 84100 ASSAY OF PHOSPHORUS: CPT

## 2025-05-28 PROCEDURE — 83735 ASSAY OF MAGNESIUM: CPT

## 2025-05-28 PROCEDURE — 80053 COMPREHEN METABOLIC PANEL: CPT

## 2025-05-28 PROCEDURE — 85027 COMPLETE CBC AUTOMATED: CPT

## 2025-05-28 PROCEDURE — 84550 ASSAY OF BLOOD/URIC ACID: CPT

## 2025-06-10 ENCOUNTER — APPOINTMENT (OUTPATIENT)
Dept: LAB | Facility: CLINIC | Age: OVER 89
End: 2025-06-10
Attending: PHYSICIAN ASSISTANT
Payer: MEDICARE

## 2025-06-10 DIAGNOSIS — E11.65 INADEQUATELY CONTROLLED DIABETES MELLITUS (HCC): ICD-10-CM

## 2025-06-10 DIAGNOSIS — N18.4 CHRONIC KIDNEY DISEASE, STAGE IV (SEVERE) (HCC): ICD-10-CM

## 2025-06-10 DIAGNOSIS — I10 ESSENTIAL HYPERTENSION, MALIGNANT: ICD-10-CM

## 2025-06-10 DIAGNOSIS — Z79.4 ENCOUNTER FOR LONG-TERM (CURRENT) USE OF INSULIN (HCC): ICD-10-CM

## 2025-06-10 DIAGNOSIS — E78.5 HYPERLIPIDEMIA, UNSPECIFIED HYPERLIPIDEMIA TYPE: ICD-10-CM

## 2025-06-10 LAB
ALBUMIN SERPL BCG-MCNC: 3.7 G/DL (ref 3.5–5)
ALP SERPL-CCNC: 64 U/L (ref 34–104)
ALT SERPL W P-5'-P-CCNC: 11 U/L (ref 7–52)
ANION GAP SERPL CALCULATED.3IONS-SCNC: 3 MMOL/L (ref 4–13)
AST SERPL W P-5'-P-CCNC: 15 U/L (ref 13–39)
BILIRUB SERPL-MCNC: 0.44 MG/DL (ref 0.2–1)
BUN SERPL-MCNC: 44 MG/DL (ref 5–25)
CALCIUM SERPL-MCNC: 9.3 MG/DL (ref 8.4–10.2)
CHLORIDE SERPL-SCNC: 108 MMOL/L (ref 96–108)
CHOLEST SERPL-MCNC: 143 MG/DL (ref ?–200)
CK SERPL-CCNC: 153 U/L (ref 26–192)
CO2 SERPL-SCNC: 28 MMOL/L (ref 21–32)
CREAT SERPL-MCNC: 1.9 MG/DL (ref 0.6–1.3)
GFR SERPL CREATININE-BSD FRML MDRD: 21 ML/MIN/1.73SQ M
GLUCOSE P FAST SERPL-MCNC: 146 MG/DL (ref 65–99)
HDLC SERPL-MCNC: 64 MG/DL
LDLC SERPL CALC-MCNC: 58 MG/DL (ref 0–100)
POTASSIUM SERPL-SCNC: 4.6 MMOL/L (ref 3.5–5.3)
PROT SERPL-MCNC: 7.1 G/DL (ref 6.4–8.4)
SODIUM SERPL-SCNC: 139 MMOL/L (ref 135–147)
TRIGL SERPL-MCNC: 103 MG/DL (ref ?–150)

## 2025-06-10 PROCEDURE — 83036 HEMOGLOBIN GLYCOSYLATED A1C: CPT

## 2025-06-10 PROCEDURE — 80053 COMPREHEN METABOLIC PANEL: CPT

## 2025-06-10 PROCEDURE — 80061 LIPID PANEL: CPT

## 2025-06-10 PROCEDURE — 82550 ASSAY OF CK (CPK): CPT

## 2025-06-10 PROCEDURE — 36415 COLL VENOUS BLD VENIPUNCTURE: CPT

## 2025-06-11 LAB
EST. AVERAGE GLUCOSE BLD GHB EST-MCNC: 194 MG/DL
HBA1C MFR BLD: 8.4 %

## 2025-07-07 ENCOUNTER — HOSPITAL ENCOUNTER (OUTPATIENT)
Dept: NON INVASIVE DIAGNOSTICS | Facility: CLINIC | Age: OVER 89
Discharge: HOME | End: 2025-07-07
Attending: PHYSICIAN ASSISTANT
Payer: MEDICARE

## 2025-07-07 DIAGNOSIS — I65.22 OCCLUSION AND STENOSIS OF LEFT CAROTID ARTERY: ICD-10-CM

## 2025-07-07 PROCEDURE — 93880 EXTRACRANIAL BILAT STUDY: CPT

## 2025-07-07 PROCEDURE — 93880 EXTRACRANIAL BILAT STUDY: CPT | Performed by: SURGERY

## 2025-07-15 ENCOUNTER — HOSPITAL ENCOUNTER (OUTPATIENT)
Dept: RADIOLOGY | Age: OVER 89
Discharge: HOME | End: 2025-07-15
Payer: MEDICARE

## 2025-07-15 VITALS — BODY MASS INDEX: 30.21 KG/M2 | HEIGHT: 61 IN | WEIGHT: 160 LBS

## 2025-07-15 DIAGNOSIS — Z79.52 CURRENT CHRONIC USE OF SYSTEMIC STEROIDS: ICD-10-CM

## 2025-07-15 DIAGNOSIS — M35.3 PMR (POLYMYALGIA RHEUMATICA) (HCC): ICD-10-CM

## 2025-07-15 DIAGNOSIS — M81.0 AGE-RELATED OSTEOPOROSIS WITHOUT CURRENT PATHOLOGICAL FRACTURE: ICD-10-CM

## 2025-07-15 PROCEDURE — 77080 DXA BONE DENSITY AXIAL: CPT

## 2025-07-25 ENCOUNTER — APPOINTMENT (INPATIENT)
Dept: RADIOLOGY | Facility: HOSPITAL | Age: 89
DRG: 683 | End: 2025-07-25
Payer: MEDICARE

## 2025-07-25 ENCOUNTER — APPOINTMENT (EMERGENCY)
Dept: RADIOLOGY | Facility: HOSPITAL | Age: 89
DRG: 683 | End: 2025-07-25
Payer: MEDICARE

## 2025-07-25 ENCOUNTER — HOSPITAL ENCOUNTER (INPATIENT)
Facility: HOSPITAL | Age: 89
DRG: 683 | End: 2025-07-25
Attending: EMERGENCY MEDICINE
Payer: MEDICARE

## 2025-07-25 DIAGNOSIS — M62.82 EXERTIONAL RHABDOMYOLYSIS: ICD-10-CM

## 2025-07-25 DIAGNOSIS — N17.9 AKI (ACUTE KIDNEY INJURY) (CMS/HCC): Primary | ICD-10-CM

## 2025-07-25 LAB
ALBUMIN SERPL-MCNC: 3.9 G/DL (ref 3.5–5.7)
ALP SERPL-CCNC: 70 IU/L (ref 34–125)
ALT SERPL-CCNC: 10 IU/L (ref 7–52)
ANION GAP SERPL CALC-SCNC: 11 MEQ/L (ref 3–15)
AST SERPL-CCNC: 23 IU/L (ref 13–39)
BACTERIA URNS QL MICRO: 1 /HPF
BASOPHILS # BLD: 0.06 K/UL (ref 0.01–0.1)
BASOPHILS NFR BLD: 0.7 %
BILIRUB SERPL-MCNC: 0.6 MG/DL (ref 0.3–1.2)
BILIRUB UR QL STRIP.AUTO: NEGATIVE MG/DL
BNP SERPL-MCNC: 35 PG/ML
BUN SERPL-MCNC: 40 MG/DL (ref 7–25)
CALCIUM SERPL-MCNC: 9.7 MG/DL (ref 8.6–10.3)
CHLORIDE SERPL-SCNC: 106 MEQ/L (ref 98–107)
CK SERPL-CCNC: 568 U/L (ref 30–223)
CLARITY UR REFRACT.AUTO: ABNORMAL
CO2 SERPL-SCNC: 22 MEQ/L (ref 21–31)
COLOR UR AUTO: YELLOW
CREAT SERPL-MCNC: 3.1 MG/DL (ref 0.6–1.2)
DIFFERENTIAL METHOD BLD: NORMAL
EGFRCR SERPLBLD CKD-EPI 2021: 13.3 ML/MIN/1.73M*2
EOSINOPHIL # BLD: 0.24 K/UL (ref 0.04–0.36)
EOSINOPHIL NFR BLD: 2.8 %
ERYTHROCYTE [DISTWIDTH] IN BLOOD BY AUTOMATED COUNT: 14.2 % (ref 11.7–14.4)
GLUCOSE BLD-MCNC: 150 MG/DL (ref 70–99)
GLUCOSE SERPL-MCNC: 148 MG/DL (ref 70–99)
GLUCOSE UR STRIP.AUTO-MCNC: >=1000 MG/DL
HCT VFR BLD AUTO: 43.7 % (ref 35–45)
HGB BLD-MCNC: 14.2 G/DL (ref 11.8–15.7)
HGB UR QL STRIP.AUTO: 2
HYALINE CASTS #/AREA URNS LPF: ABNORMAL /LPF
IMM GRANULOCYTES # BLD AUTO: 0.05 K/UL (ref 0–0.08)
IMM GRANULOCYTES NFR BLD AUTO: 0.6 %
KETONES UR STRIP.AUTO-MCNC: NEGATIVE MG/DL
LEUKOCYTE ESTERASE UR QL STRIP.AUTO: 3
LYMPHOCYTES # BLD: 1.68 K/UL (ref 1.2–3.5)
LYMPHOCYTES NFR BLD: 19.8 %
MCH RBC QN AUTO: 28 PG (ref 28–33.2)
MCHC RBC AUTO-ENTMCNC: 32.5 G/DL (ref 32.2–35.5)
MCV RBC AUTO: 86.2 FL (ref 83–98)
MONOCYTES # BLD: 0.71 K/UL (ref 0.28–0.8)
MONOCYTES NFR BLD: 8.4 %
NEUTROPHILS # BLD: 5.73 K/UL (ref 1.7–7)
NEUTS SEG NFR BLD: 67.7 %
NITRITE UR QL STRIP.AUTO: NEGATIVE
NRBC BLD-RTO: 0 %
PH UR STRIP.AUTO: 6 [PH]
PLATELET # BLD AUTO: 207 K/UL (ref 150–369)
PMV BLD AUTO: 11.3 FL (ref 9.4–12.3)
POCT TEST: ABNORMAL
POTASSIUM SERPL-SCNC: 4.6 MEQ/L (ref 3.5–5.1)
PROT SERPL-MCNC: 7.6 G/DL (ref 6–8.2)
PROT UR QL STRIP.AUTO: 1
RBC # BLD AUTO: 5.07 M/UL (ref 3.93–5.22)
RBC #/AREA URNS HPF: ABNORMAL /HPF
SODIUM SERPL-SCNC: 139 MEQ/L (ref 136–145)
SP GR UR REFRACT.AUTO: 1.01
SQUAMOUS URNS QL MICRO: ABNORMAL /HPF
TRANS CELLS URNS QL MICRO: ABNORMAL /HPF
UROBILINOGEN UR STRIP-ACNC: 0.2 EU/DL
WBC # BLD AUTO: 8.47 K/UL (ref 3.8–10.5)
WBC #/AREA URNS HPF: ABNORMAL /HPF

## 2025-07-25 PROCEDURE — 70450 CT HEAD/BRAIN W/O DYE: CPT

## 2025-07-25 PROCEDURE — 25000000 HC PHARMACY GENERAL

## 2025-07-25 PROCEDURE — 83880 ASSAY OF NATRIURETIC PEPTIDE: CPT | Performed by: PHYSICIAN ASSISTANT

## 2025-07-25 PROCEDURE — 87086 URINE CULTURE/COLONY COUNT: CPT | Performed by: PHYSICIAN ASSISTANT

## 2025-07-25 PROCEDURE — 12000000 HC ROOM AND CARE MED/SURG

## 2025-07-25 PROCEDURE — 99223 1ST HOSP IP/OBS HIGH 75: CPT

## 2025-07-25 PROCEDURE — 63700000 HC SELF-ADMINISTRABLE DRUG

## 2025-07-25 PROCEDURE — 71046 X-RAY EXAM CHEST 2 VIEWS: CPT

## 2025-07-25 PROCEDURE — 82550 ASSAY OF CK (CPK): CPT | Performed by: PHYSICIAN ASSISTANT

## 2025-07-25 PROCEDURE — 80053 COMPREHEN METABOLIC PANEL: CPT | Performed by: PHYSICIAN ASSISTANT

## 2025-07-25 PROCEDURE — 81003 URINALYSIS AUTO W/O SCOPE: CPT | Performed by: PHYSICIAN ASSISTANT

## 2025-07-25 PROCEDURE — 25800000 HC PHARMACY IV SOLUTIONS: Performed by: PHYSICIAN ASSISTANT

## 2025-07-25 PROCEDURE — 36415 COLL VENOUS BLD VENIPUNCTURE: CPT | Performed by: PHYSICIAN ASSISTANT

## 2025-07-25 PROCEDURE — 85025 COMPLETE CBC W/AUTO DIFF WBC: CPT | Performed by: PHYSICIAN ASSISTANT

## 2025-07-25 PROCEDURE — 1124F ACP DISCUSS-NO DSCNMKR DOCD: CPT

## 2025-07-25 PROCEDURE — 93005 ELECTROCARDIOGRAM TRACING: CPT | Performed by: PHYSICIAN ASSISTANT

## 2025-07-25 PROCEDURE — 99285 EMERGENCY DEPT VISIT HI MDM: CPT | Mod: 25

## 2025-07-25 RX ORDER — VALSARTAN 160 MG/1
320 TABLET ORAL DAILY
Status: DISCONTINUED | OUTPATIENT
Start: 2025-07-26 | End: 2025-07-27

## 2025-07-25 RX ORDER — ACETAMINOPHEN 325 MG/1
650 TABLET ORAL EVERY 4 HOURS PRN
Status: DISCONTINUED | OUTPATIENT
Start: 2025-07-25 | End: 2025-07-28 | Stop reason: HOSPADM

## 2025-07-25 RX ORDER — PSYLLIUM HUSK 0.4 G
2 CAPSULE ORAL DAILY
Status: DISCONTINUED | OUTPATIENT
Start: 2025-07-26 | End: 2025-07-28 | Stop reason: HOSPADM

## 2025-07-25 RX ORDER — DEXTROSE 50 % IN WATER (D50W) INTRAVENOUS SYRINGE
25 AS NEEDED
Status: DISCONTINUED | OUTPATIENT
Start: 2025-07-25 | End: 2025-07-28 | Stop reason: HOSPADM

## 2025-07-25 RX ORDER — INSULIN GLARGINE 100 [IU]/ML
15 INJECTION, SOLUTION SUBCUTANEOUS NIGHTLY
Status: DISCONTINUED | OUTPATIENT
Start: 2025-07-25 | End: 2025-07-28 | Stop reason: HOSPADM

## 2025-07-25 RX ORDER — ADHESIVE BANDAGE 7/8"
15-30 BANDAGE TOPICAL AS NEEDED
Status: DISCONTINUED | OUTPATIENT
Start: 2025-07-25 | End: 2025-07-28 | Stop reason: HOSPADM

## 2025-07-25 RX ORDER — ASPIRIN 81 MG/1
81 TABLET ORAL DAILY
Status: DISCONTINUED | OUTPATIENT
Start: 2025-07-26 | End: 2025-07-28 | Stop reason: HOSPADM

## 2025-07-25 RX ORDER — ALBUTEROL SULFATE 0.83 MG/ML
2.5 SOLUTION RESPIRATORY (INHALATION) EVERY 6 HOURS PRN
Status: DISCONTINUED | OUTPATIENT
Start: 2025-07-25 | End: 2025-07-28 | Stop reason: HOSPADM

## 2025-07-25 RX ORDER — DOCUSATE SODIUM 100 MG/1
100 CAPSULE, LIQUID FILLED ORAL DAILY PRN
COMMUNITY

## 2025-07-25 RX ORDER — ATORVASTATIN CALCIUM 20 MG/1
20 TABLET, FILM COATED ORAL DAILY
Status: DISCONTINUED | OUTPATIENT
Start: 2025-07-26 | End: 2025-07-28 | Stop reason: HOSPADM

## 2025-07-25 RX ORDER — DEXTROSE 40 %
15-30 GEL (GRAM) ORAL AS NEEDED
Status: DISCONTINUED | OUTPATIENT
Start: 2025-07-25 | End: 2025-07-28 | Stop reason: HOSPADM

## 2025-07-25 RX ORDER — FLUTICASONE PROPIONATE 50 MCG
1 SPRAY, SUSPENSION (ML) NASAL DAILY PRN
Status: DISCONTINUED | OUTPATIENT
Start: 2025-07-25 | End: 2025-07-28 | Stop reason: HOSPADM

## 2025-07-25 RX ORDER — HEPARIN SODIUM 5000 [USP'U]/ML
5000 INJECTION, SOLUTION INTRAVENOUS; SUBCUTANEOUS EVERY 8 HOURS
Status: DISCONTINUED | OUTPATIENT
Start: 2025-07-26 | End: 2025-07-28 | Stop reason: HOSPADM

## 2025-07-25 RX ORDER — DICLOFENAC SODIUM 10 MG/G
GEL TOPICAL 2 TIMES DAILY PRN
Status: DISCONTINUED | OUTPATIENT
Start: 2025-07-25 | End: 2025-07-28 | Stop reason: HOSPADM

## 2025-07-25 RX ORDER — CALCIUM CITRATE/VITAMIN D3 500MG-12.5
1 TABLET,CHEWABLE ORAL DAILY
COMMUNITY

## 2025-07-25 RX ORDER — IPRATROPIUM BROMIDE 42 UG/1
2 SPRAY, METERED NASAL 2 TIMES DAILY
COMMUNITY

## 2025-07-25 RX ORDER — CARBOXYMETHYLCELLULOSE SODIUM 5 MG/ML
2 SOLUTION/ DROPS OPHTHALMIC 3 TIMES DAILY
Status: DISCONTINUED | OUTPATIENT
Start: 2025-07-25 | End: 2025-07-28 | Stop reason: HOSPADM

## 2025-07-25 RX ORDER — LEVOTHYROXINE SODIUM 100 UG/1
100 TABLET ORAL
Status: DISCONTINUED | OUTPATIENT
Start: 2025-07-26 | End: 2025-07-28 | Stop reason: HOSPADM

## 2025-07-25 RX ORDER — OXYBUTYNIN CHLORIDE 5 MG/1
10 TABLET, EXTENDED RELEASE ORAL DAILY
Status: DISCONTINUED | OUTPATIENT
Start: 2025-07-26 | End: 2025-07-28 | Stop reason: HOSPADM

## 2025-07-25 RX ADMIN — ACETAMINOPHEN 650 MG: 325 TABLET ORAL at 23:26

## 2025-07-25 RX ADMIN — SODIUM CHLORIDE 1000 ML: 9 INJECTION, SOLUTION INTRAVENOUS at 20:21

## 2025-07-25 RX ADMIN — ALBUTEROL SULFATE 2.5 MG: 2.5 SOLUTION RESPIRATORY (INHALATION) at 23:26

## 2025-07-25 RX ADMIN — Medication 2 DROP: at 23:26

## 2025-07-25 ASSESSMENT — ENCOUNTER SYMPTOMS
COUGH: 0
ABDOMINAL PAIN: 0
DECREASED CONCENTRATION: 0
MYALGIAS: 0
FEVER: 0
CHILLS: 0
SHORTNESS OF BREATH: 0
SORE THROAT: 0
VOMITING: 0
NAUSEA: 0
CONFUSION: 0

## 2025-07-25 ASSESSMENT — COGNITIVE AND FUNCTIONAL STATUS - GENERAL
MOVING TO AND FROM BED TO CHAIR: 4 - NONE
CLIMB 3 TO 5 STEPS WITH RAILING: 4 - NONE
WALKING IN HOSPITAL ROOM: 4 - NONE
STANDING UP FROM CHAIR USING ARMS: 4 - NONE

## 2025-07-25 NOTE — ED PROVIDER NOTES
Emergency Medicine Note  HPI   HISTORY OF PRESENT ILLNESS     This is a 89-year-old female with past medical history for CKD stage IV, polymyalgia rheumatica, IDDM type II, cognitive impairment, asthma, hypertension, COPD, carotid stenosis, hyperlipidemia who was missing from her residence for approximately 1 AM this morning.  She was found at the th [place] terminal.  According to the patient, she was given a ride by a stranger to [address] [place] [address] station, where she rode the buses for a while today, and eventually brought to the emergency department by police/EMS, where she was identified.  Family is presently in the room with her. the patient states that she has not eaten or drank anything all day, and is thirsty.      History provided by:  Patient, relative and medical records   used: No    Illness  Location:  Generalized  Quality:  Thirsty  Severity:  Unable to specify  Onset quality:  Gradual  Duration:  1 day  Timing:  Constant  Progression:  Unable to specify  Chronicity:  New  Context:  Patient was a missing person all day, brought to the emergency department approximately 5 PM this evening  Relieved by:  Rest, water  Worsened by:  Nothing  Associated symptoms: no abdominal pain, no chest pain, no congestion, no cough, no fever, no myalgias, no nausea, no shortness of breath, no sore throat and no vomiting          Patient History   PAST HISTORY     Reviewed from Nursing Triage:  Meds       No past medical history on file.    No past surgical history on file.    No family history on file.           Review of Systems   REVIEW OF SYSTEMS     Review of Systems   Constitutional:  Negative for chills and fever.   HENT:  Negative for congestion and sore throat.    Respiratory:  Negative for cough and shortness of breath.    Cardiovascular:  Positive for leg swelling. Negative for chest pain.   Gastrointestinal:  Negative for abdominal pain, nausea and vomiting.   Musculoskeletal:  Negative for  myalgias.   Psychiatric/Behavioral:  Negative for confusion and decreased concentration.          VITALS     ED Vitals      Date/Time Temp Pulse Resp BP SpO2 [facility]   07/25/25 2100 -- 93 18 158/89 99 % DB   07/25/25 2020 -- 81 18 150/67 98 % DB   07/25/25 1715 36.9 °C (98.5 °F) -- -- -- -- SPB   07/25/25 1713 -- 100 18 111/57 99 % SPB                         Physical Exam   PHYSICAL EXAM     Physical Exam  Constitutional:       General: She is not in acute distress.     Appearance: Normal appearance. She is normal weight. She is not ill-appearing.   HENT:      Head: Normocephalic and atraumatic.      Nose: No congestion or rhinorrhea.      Mouth/Throat:      Mouth: Mucous membranes are dry.   Eyes:      General: No scleral icterus.     Conjunctiva/sclera: Conjunctivae normal.   Cardiovascular:      Rate and Rhythm: Normal rate and regular rhythm.      Pulses: Normal pulses.      Heart sounds: Normal heart sounds. No murmur heard.  Pulmonary:      Effort: Pulmonary effort is normal. No respiratory distress.      Breath sounds: Normal breath sounds. No wheezing.   Musculoskeletal:      Cervical back: Normal range of motion. No rigidity.      Right lower leg: Edema present.      Left lower leg: Edema present.   Skin:     General: Skin is warm and dry.   Neurological:      General: No focal deficit present.      Mental Status: She is alert and oriented to person, place, and time.   Psychiatric:         Mood and Affect: Mood normal.         Behavior: Behavior normal.         Thought Content: Thought content normal.         Judgment: Judgment normal.           PROCEDURES     Procedures     DATA     Results       Procedure Component Value Units Date/Time    UA w/ reflex culture (ED Only) [[id number]]  (Abnormal) Collected: 07/25/25 2104    Specimen: Urine, Clean Catch Updated: 07/25/25 2140    Narrative:      The following orders were created for panel order UA w/ reflex culture (ED Only).  Procedure                                Abnormality         Status                     ---------                               -----------         ------                     UA Reflex to Culture (UC...[[id number]]  Abnormal            Final result               UA Microscopic[[id number]]               Abnormal            Final result                 Please view results for these tests on the individual orders.    UA Microscopic [[id number]]  (Abnormal) Collected: 07/25/25 2104    Specimen: Urine, Clean Catch Updated: 07/25/25 2140     RBC, Urine Too Numerous To Count /HPF      WBC, Urine 35 TO 50 /HPF      Squamous Epithelial Rare /hpf      Hyaline Cast 0 TO 2 /lpf      Bacteria, Urine +1 /HPF      Transitional Epithelial Rare /hpf     UA Reflex to Culture ([facility] Macroscopic) [[id number]]  (Abnormal) Collected: 07/25/25 2104    Specimen: Urine, Clean Catch Updated: 07/25/25 2122     Color, Urine Yellow     Clarity, Urine Cloudy     Specific Gravity, Urine 1.015     pH, Urine 6.0     Leukocyte Esterase +3     Nitrite, Urine Negative     Protein, Urine +1     Glucose, Urine >=1000 mg/dL      Ketones, Urine Negative mg/dL      Urobilinogen, Urine 0.2 EU/dL      Bilirubin, Urine Negative mg/dL      Blood, Urine +2     Comment: The sensitivity of the occult blood test is equivalent to approximately 4 intact RBC/HPF.       BNP (B-type natriuretic peptide) [[id number]]  (Normal) Collected: 07/25/25 1721    Specimen: Blood, Venous Updated: 07/25/25 2026     BNP 35 pg/mL     Comprehensive metabolic panel [[id number]]  (Abnormal) Collected: 07/25/25 1721    Specimen: Blood, Venous Updated: 07/25/25 2008     Sodium 139 mEQ/L      Potassium 4.6 mEQ/L      Comment: Results obtained on plasma. Plasma Potassium values may be up to 0.4 mEQ/L less than serum values. The differences may be greater for patients with high platelet or white cell counts.        Chloride 106 mEQ/L      CO2 22 mEQ/L      BUN 40 mg/dL      Creatinine 3.1 mg/dL      Glucose 148 mg/dL      Calcium 9.7  mg/dL      AST (SGOT) 23 IU/L      ALT (SGPT) 10 IU/L      Alkaline Phosphatase 70 IU/L      Total Protein 7.6 g/dL      Comment: Test performed on plasma which typically contains approximately 0.4 g/dL more protein than serum.        Albumin 3.9 g/dL      Bilirubin, Total 0.6 mg/dL      eGFR 13.3 mL/min/1.73m*2      Comment: Calculation based on the Chronic Kidney Disease Epidemiology Collaboration (CKD-EPI) equation refit without adjustment for race.        Anion Gap 11 mEQ/L     CK, Total [[id number]]  (Abnormal) Collected: 07/25/25 1721    Specimen: Blood, Venous Updated: 07/25/25 2008     Total  U/L     CBC and differential [[id number]] Collected: 07/25/25 1721    Specimen: Blood, Venous Updated: 07/25/25 1943     WBC 8.47 K/uL      RBC 5.07 M/uL      Hemoglobin 14.2 g/dL      Hematocrit 43.7 %      MCV 86.2 fL      MCH 28.0 pg      MCHC 32.5 g/dL      RDW 14.2 %      Platelets 207 K/uL      MPV 11.3 fL      Differential Type Auto     nRBC 0.0 %      Immature Granulocytes 0.6 %      Neutrophils 67.7 %      Lymphocytes 19.8 %      Monocytes 8.4 %      Eosinophils 2.8 %      Basophils 0.7 %      Immature Granulocytes, Absolute 0.05 K/uL      Neutrophils, Absolute 5.73 K/uL      Lymphocytes, Absolute 1.68 K/uL      Monocytes, Absolute 0.71 K/uL      Eosinophils, Absolute 0.24 K/uL      Basophils, Absolute 0.06 K/uL     RAINBOW LT BLUE [[id number]] Collected: 07/25/25 1721    Specimen: Blood, Venous Updated: 07/25/25 1937    Swain Draw Panel [[id number]] Collected: 07/25/25 1721    Specimen: Blood, Venous Updated: 07/25/25 1937    Narrative:      The following orders were created for panel order Swain Draw Panel.  Procedure                               Abnormality         Status                     ---------                               -----------         ------                     RAINBOW LT BLUE[[id number]]                                  In process                 ELIAS GOLD[[id number]]                                      In process                   Please view results for these tests on the individual orders.    RAINBOW GOLD [[id number]] Collected: 07/25/25 1721    Specimen: Blood, Venous Updated: 07/25/25 1937            Imaging Results              CT HEAD WITHOUT IV CONTRAST (Final result)  Result time 07/25/25 21:38:34      Final result                   Impression:    IMPRESSION:    No acute intracranial hemorrhage, large territorial infarct, mass effect or  midline shift is identified.                 Narrative:    CLINICAL HISTORY: Memory loss   .    COMPARISON: None available.    TECHNIQUE: Routine unenhanced CT scan was performed of the head using  departmental protocol.    CT DOSE:  One or more dose reduction techniques (e.g. automated exposure  control, adjustment of the mA and/or kV according to patient size, use of  iterative reconstruction technique) utilized for this examination.    COMMENT:    There is no evidence of acute intracranial hemorrhage or mass effect. There is  no evidence of midline shift. The cisterns and sulci are clear. The gray-white  differentiation is unremarkable. There is periventricular, subcortical and deep  white matter hypoattenuation in keeping with chronic small vessel ischemic  changes. There is diffuse cortical atrophy. There is no large territorial  infarct.  However, MRI is more sensitive for the evaluation of acute ischemia.  The deep gray nuclei, brainstem and cerebellum are otherwise unremarkable. The  ventricles and extra-axial spaces demonstrate no acute abnormalities.    There is mild partial opacification of the paranasal sinuses.  The visualized  mastoid air cells are clear. The orbits and visualized parapharyngeal soft  tissues are unremarkable. The bony calvarium is intact.                                       X-RAY CHEST 2 VIEWS (In process)                     ECG 12 lead          Scoring tools                                  ED Course & MDM   MDM / ED  COURSE / CLINICAL IMPRESSION / DISPO     Medical Decision Making  This is a 89-year-old female with past medical history for CKD stage IV, polymyalgia rheumatica, IDDM type II, cognitive impairment, asthma, hypertension, COPD, carotid stenosis, hyperlipidemia who was missing from her residence for approximately 1 AM this morning.  She was found at the [address] [place] [address] terminal.  According to the patient, she was given a ride by a stranger to [address] [place] [address] station, where she rode the buses for a while today, and eventually brought to the emergency department by police/EMS, where she was identified.  Family is presently in the room with her. the patient states that she has not eaten or drank anything all day, and is thirsty.      Problems Addressed:  RODOLFO (acute kidney injury) (CMS/[facility]): acute illness or injury  Exertional rhabdomyolysis: acute illness or injury    Amount and/or Complexity of Data Reviewed  External Data Reviewed: notes.     Details: Internal medicine progress note reviewed; May, 2020  Labs: ordered.  Radiology: ordered.  ECG/medicine tests: ordered.    Risk  Decision regarding hospitalization.           Clinical Impression      RODOLFO (acute kidney injury) (CMS/HCC)   Exertional rhabdomyolysis     _________________       ED Disposition   Admit / Observation                       Genevieve Navarro PA C  07/25/25 4270

## 2025-07-26 ENCOUNTER — APPOINTMENT (INPATIENT)
Dept: RADIOLOGY | Facility: HOSPITAL | Age: 89
DRG: 683 | End: 2025-07-26
Payer: MEDICARE

## 2025-07-26 LAB
ANION GAP SERPL CALC-SCNC: 7 MEQ/L (ref 3–15)
ATRIAL RATE: 100
BASOPHILS # BLD: 0.03 K/UL (ref 0.01–0.1)
BASOPHILS NFR BLD: 0.5 %
BUN SERPL-MCNC: 40 MG/DL (ref 7–25)
CALCIUM SERPL-MCNC: 8.4 MG/DL (ref 8.6–10.3)
CHLORIDE SERPL-SCNC: 113 MEQ/L (ref 98–107)
CK SERPL-CCNC: 1082 U/L (ref 30–223)
CO2 SERPL-SCNC: 22 MEQ/L (ref 21–31)
CREAT SERPL-MCNC: 2.3 MG/DL (ref 0.6–1.2)
DIFFERENTIAL METHOD BLD: NORMAL
EGFRCR SERPLBLD CKD-EPI 2021: 19 ML/MIN/1.73M*2
EOSINOPHIL # BLD: 0.31 K/UL (ref 0.04–0.36)
EOSINOPHIL NFR BLD: 5 %
ERYTHROCYTE [DISTWIDTH] IN BLOOD BY AUTOMATED COUNT: 14.1 % (ref 11.7–14.4)
GLUCOSE BLD-MCNC: 194 MG/DL (ref 70–99)
GLUCOSE BLD-MCNC: 218 MG/DL (ref 70–99)
GLUCOSE BLD-MCNC: 230 MG/DL (ref 70–99)
GLUCOSE BLD-MCNC: 243 MG/DL (ref 70–99)
GLUCOSE BLD-MCNC: 308 MG/DL (ref 70–99)
GLUCOSE SERPL-MCNC: 218 MG/DL (ref 70–99)
HCT VFR BLD AUTO: 39.4 % (ref 35–45)
HGB BLD-MCNC: 12.8 G/DL (ref 11.8–15.7)
IMM GRANULOCYTES # BLD AUTO: 0.02 K/UL (ref 0–0.08)
IMM GRANULOCYTES NFR BLD AUTO: 0.3 %
LYMPHOCYTES # BLD: 1.69 K/UL (ref 1.2–3.5)
LYMPHOCYTES NFR BLD: 27.3 %
MAGNESIUM SERPL-MCNC: 2.2 MG/DL (ref 1.8–2.5)
MCH RBC QN AUTO: 28.4 PG (ref 28–33.2)
MCHC RBC AUTO-ENTMCNC: 32.5 G/DL (ref 32.2–35.5)
MCV RBC AUTO: 87.4 FL (ref 83–98)
MONOCYTES # BLD: 0.5 K/UL (ref 0.28–0.8)
MONOCYTES NFR BLD: 8.1 %
NEUTROPHILS # BLD: 3.63 K/UL (ref 1.7–7)
NEUTS SEG NFR BLD: 58.8 %
NRBC BLD-RTO: 0 %
P AXIS: 62
PHOSPHATE SERPL-MCNC: 3.6 MG/DL (ref 2.4–4.7)
PLATELET # BLD AUTO: 166 K/UL (ref 150–369)
PMV BLD AUTO: 10.9 FL (ref 9.4–12.3)
POCT TEST: ABNORMAL
POTASSIUM SERPL-SCNC: 4.1 MEQ/L (ref 3.5–5.1)
PR INTERVAL: 205
QRS DURATION: 134
QT INTERVAL: 380
QTC CALCULATION(BAZETT): 490
R AXIS: -87
RBC # BLD AUTO: 4.51 M/UL (ref 3.93–5.22)
SODIUM SERPL-SCNC: 142 MEQ/L (ref 136–145)
T WAVE AXIS: 48
VENTRICULAR RATE: 100
WBC # BLD AUTO: 6.18 K/UL (ref 3.8–10.5)

## 2025-07-26 PROCEDURE — 63700000 HC SELF-ADMINISTRABLE DRUG: Performed by: STUDENT IN AN ORGANIZED HEALTH CARE EDUCATION/TRAINING PROGRAM

## 2025-07-26 PROCEDURE — 83735 ASSAY OF MAGNESIUM: CPT

## 2025-07-26 PROCEDURE — 36415 COLL VENOUS BLD VENIPUNCTURE: CPT

## 2025-07-26 PROCEDURE — 63700000 HC SELF-ADMINISTRABLE DRUG

## 2025-07-26 PROCEDURE — 70551 MRI BRAIN STEM W/O DYE: CPT

## 2025-07-26 PROCEDURE — 99233 SBSQ HOSP IP/OBS HIGH 50: CPT | Performed by: STUDENT IN AN ORGANIZED HEALTH CARE EDUCATION/TRAINING PROGRAM

## 2025-07-26 PROCEDURE — 63700000 HC SELF-ADMINISTRABLE DRUG: Performed by: NURSE PRACTITIONER

## 2025-07-26 PROCEDURE — 25000000 HC PHARMACY GENERAL

## 2025-07-26 PROCEDURE — 80048 BASIC METABOLIC PNL TOTAL CA: CPT

## 2025-07-26 PROCEDURE — 63600000 HC DRUGS/DETAIL CODE

## 2025-07-26 PROCEDURE — 93005 ELECTROCARDIOGRAM TRACING: CPT | Performed by: STUDENT IN AN ORGANIZED HEALTH CARE EDUCATION/TRAINING PROGRAM

## 2025-07-26 PROCEDURE — 12000000 HC ROOM AND CARE MED/SURG

## 2025-07-26 PROCEDURE — 85025 COMPLETE CBC W/AUTO DIFF WBC: CPT

## 2025-07-26 PROCEDURE — 84100 ASSAY OF PHOSPHORUS: CPT

## 2025-07-26 PROCEDURE — 63600000 HC DRUGS/DETAIL CODE: Mod: JZ | Performed by: STUDENT IN AN ORGANIZED HEALTH CARE EDUCATION/TRAINING PROGRAM

## 2025-07-26 PROCEDURE — 82550 ASSAY OF CK (CPK): CPT

## 2025-07-26 PROCEDURE — 25800000 HC PHARMACY IV SOLUTIONS

## 2025-07-26 PROCEDURE — 93010 ELECTROCARDIOGRAM REPORT: CPT | Performed by: INTERNAL MEDICINE

## 2025-07-26 RX ORDER — SODIUM CHLORIDE, SODIUM LACTATE, POTASSIUM CHLORIDE, CALCIUM CHLORIDE 600; 310; 30; 20 MG/100ML; MG/100ML; MG/100ML; MG/100ML
INJECTION, SOLUTION INTRAVENOUS CONTINUOUS
Status: ACTIVE | OUTPATIENT
Start: 2025-07-26 | End: 2025-07-27

## 2025-07-26 RX ORDER — POLYETHYLENE GLYCOL 3350 17 G/17G
17 POWDER, FOR SOLUTION ORAL DAILY
Status: DISCONTINUED | OUTPATIENT
Start: 2025-07-26 | End: 2025-07-28 | Stop reason: HOSPADM

## 2025-07-26 RX ORDER — INSULIN LISPRO 100 [IU]/ML
3-5 INJECTION, SOLUTION INTRAVENOUS; SUBCUTANEOUS
Status: DISCONTINUED | OUTPATIENT
Start: 2025-07-26 | End: 2025-07-28 | Stop reason: HOSPADM

## 2025-07-26 RX ORDER — CIPROFLOXACIN 500 MG/1
500 TABLET, FILM COATED ORAL EVERY 12 HOURS
Status: DISCONTINUED | OUTPATIENT
Start: 2025-07-26 | End: 2025-07-26

## 2025-07-26 RX ORDER — INSULIN LISPRO 100 [IU]/ML
8 INJECTION, SOLUTION INTRAVENOUS; SUBCUTANEOUS ONCE
Status: COMPLETED | OUTPATIENT
Start: 2025-07-26 | End: 2025-07-26

## 2025-07-26 RX ORDER — CIPROFLOXACIN 500 MG/1
500 TABLET, FILM COATED ORAL DAILY
Status: DISCONTINUED | OUTPATIENT
Start: 2025-07-26 | End: 2025-07-27

## 2025-07-26 RX ADMIN — INSULIN LISPRO 3 UNITS: 100 INJECTION, SOLUTION INTRAVENOUS; SUBCUTANEOUS at 12:29

## 2025-07-26 RX ADMIN — Medication 2 TABLET: at 08:21

## 2025-07-26 RX ADMIN — POLYETHYLENE GLYCOL 3350 17 G: 17 POWDER, FOR SOLUTION ORAL at 12:29

## 2025-07-26 RX ADMIN — INSULIN LISPRO 3 UNITS: 100 INJECTION, SOLUTION INTRAVENOUS; SUBCUTANEOUS at 08:25

## 2025-07-26 RX ADMIN — OXYBUTYNIN CHLORIDE 10 MG: 5 TABLET, EXTENDED RELEASE ORAL at 08:21

## 2025-07-26 RX ADMIN — MOMETASONE FUROATE AND FORMOTEROL FUMARATE DIHYDRATE 2 PUFF: 200; 5 AEROSOL RESPIRATORY (INHALATION) at 08:24

## 2025-07-26 RX ADMIN — INSULIN LISPRO 3 UNITS: 100 INJECTION, SOLUTION INTRAVENOUS; SUBCUTANEOUS at 17:30

## 2025-07-26 RX ADMIN — ATORVASTATIN CALCIUM 20 MG: 20 TABLET, FILM COATED ORAL at 08:21

## 2025-07-26 RX ADMIN — Medication 2 DROP: at 19:53

## 2025-07-26 RX ADMIN — SODIUM CHLORIDE 500 ML: 9 INJECTION, SOLUTION INTRAVENOUS at 01:29

## 2025-07-26 RX ADMIN — LEVOTHYROXINE SODIUM 100 MCG: 0.1 TABLET ORAL at 06:03

## 2025-07-26 RX ADMIN — INSULIN LISPRO 8 UNITS: 100 INJECTION, SOLUTION INTRAVENOUS; SUBCUTANEOUS at 03:24

## 2025-07-26 RX ADMIN — Medication 2 DROP: at 08:21

## 2025-07-26 RX ADMIN — HEPARIN SODIUM 5000 UNITS: 5000 INJECTION, SOLUTION INTRAVENOUS; SUBCUTANEOUS at 21:49

## 2025-07-26 RX ADMIN — MOMETASONE FUROATE AND FORMOTEROL FUMARATE DIHYDRATE 2 PUFF: 200; 5 AEROSOL RESPIRATORY (INHALATION) at 19:53

## 2025-07-26 RX ADMIN — HEPARIN SODIUM 5000 UNITS: 5000 INJECTION, SOLUTION INTRAVENOUS; SUBCUTANEOUS at 06:03

## 2025-07-26 RX ADMIN — MOMETASONE FUROATE AND FORMOTEROL FUMARATE DIHYDRATE 2 PUFF: 200; 5 AEROSOL RESPIRATORY (INHALATION) at 01:29

## 2025-07-26 RX ADMIN — ASPIRIN 81 MG: 81 TABLET, COATED ORAL at 08:21

## 2025-07-26 RX ADMIN — DICLOFENAC SODIUM: 10 GEL TOPICAL at 01:29

## 2025-07-26 RX ADMIN — Medication 2 DROP: at 14:10

## 2025-07-26 RX ADMIN — SODIUM CHLORIDE, POTASSIUM CHLORIDE, SODIUM LACTATE AND CALCIUM CHLORIDE: 600; 310; 30; 20 INJECTION, SOLUTION INTRAVENOUS at 14:11

## 2025-07-26 RX ADMIN — HEPARIN SODIUM 5000 UNITS: 5000 INJECTION, SOLUTION INTRAVENOUS; SUBCUTANEOUS at 14:10

## 2025-07-26 RX ADMIN — INSULIN GLARGINE 15 UNITS: 100 INJECTION, SOLUTION SUBCUTANEOUS at 01:39

## 2025-07-26 RX ADMIN — TIOTROPIUM BROMIDE INHALATION SPRAY 2 PUFF: 3.12 SPRAY, METERED RESPIRATORY (INHALATION) at 08:24

## 2025-07-26 RX ADMIN — CIPROFLOXACIN 500 MG: 500 TABLET ORAL at 10:35

## 2025-07-26 RX ADMIN — INSULIN GLARGINE 15 UNITS: 100 INJECTION, SOLUTION SUBCUTANEOUS at 21:37

## 2025-07-26 ASSESSMENT — COGNITIVE AND FUNCTIONAL STATUS - GENERAL
STANDING UP FROM CHAIR USING ARMS: 3 - A LITTLE
CLIMB 3 TO 5 STEPS WITH RAILING: 3 - A LITTLE
STANDING UP FROM CHAIR USING ARMS: 4 - NONE
MOVING TO AND FROM BED TO CHAIR: 3 - A LITTLE
MOVING TO AND FROM BED TO CHAIR: 4 - NONE
WALKING IN HOSPITAL ROOM: 3 - A LITTLE
WALKING IN HOSPITAL ROOM: 4 - NONE
CLIMB 3 TO 5 STEPS WITH RAILING: 4 - NONE

## 2025-07-26 NOTE — PLAN OF CARE
Plan of Care Review  Plan of Care Reviewed With: patient  Progress: improving  Outcome Evaluation: Patient alert and oriented with period sof confusion. Toleratig meals. Family at bedside. Purewick intact. Fall precautions maintained. MRI of brain completed.

## 2025-07-26 NOTE — PROGRESS NOTES
Spoke with patient's daughter and confirmed with medication list from [facility] and/or patient's own pharmacy to complete the medication reconciliation.     Prior to admission medication list:    Current Outpatient Medications:     aspirin 81 mg enteric coated tablet, Take 81 mg by mouth daily., Disp: , Rfl:     atorvastatin (LIPITOR) 20 mg tablet, Take 20 mg by mouth daily., Disp: , Rfl:     calcium citrate-vitamin D3 500 mg-12.5 mcg (500 unit) tablet,chewable, Take 1 tablet by mouth daily., Disp: , Rfl:     dulaglutide (TRULICITY) 1.5 mg/0.5 mL pen injector, Inject 1.5 mg under the skin once a week on Monday. Do not prime. Every Saturday  Hold if blood sugar is less than 240., Disp: , Rfl:     empagliflozin (JARDIANCE) 10 mg tablet, Take 10 mg by mouth daily., Disp: , Rfl:     fluticasone-umeclidinium-vilanterol (TRELEGY ELLIPTA) 200-62.5-25 mcg blister with device powder for inhalation, Inhale 1 puff daily., Disp: , Rfl:     insulin glargine U-100 (LANTUS/BASAGLAR) 100 unit/mL (3 mL) pen, Inject 15 Units under the skin nightly., Disp: , Rfl:     ipratropium (ATROVENT) 42 mcg (0.06 %) nasal spray, Administer 2 sprays into each nostril 2 (two) times a day., Disp: , Rfl:     levothyroxine (SYNTHROID) 100 mcg tablet, Take 100 mcg by mouth daily., Disp: , Rfl:     oxybutynin XL (DITROPAN-XL) 10 mg 24 hr tablet, Take 10 mg by mouth daily., Disp: , Rfl:     valsartan (DIOVAN) 320 mg tablet, Take 320 mg by mouth daily., Disp: , Rfl:     docusate sodium (COLACE) 100 mg capsule, Take 100 mg by mouth daily as needed for constipation., Disp: , Rfl:     fluticasone propionate (FLONASE) 50 mcg/actuation nasal spray, Administer 1 spray into each nostril daily as needed for rhinitis., Disp: , Rfl:    Comments about home medications:  - Patient's daughter states patient takes Trulicity once a week only when blood sugar is greater than 240 and the weeks blood sugar is lower than 240 she does not take medication.     Compliance:    - Atrovent last filled 2/21/25 for 14 day supply.   - Basaglar last filled 4/14/25 for 75 day supply.   - Trelegy Ellipta last filled 6/12/25 for 30 day supply.   - Trulicity last filled 5/22/25 for 28 day supply.  - Questionable compliance based on patient interview.

## 2025-07-26 NOTE — ED ATTESTATION NOTE
I have personally seen and examined Tiki Radford.  I was involved in the care and medical decision making for this patient.    I reviewed and agree with physician assistant / nurse practitioner’s assessment and plan of care; any exceptions are documented below.      My focused history, examination, assessment and plan of care of Tiki Radford is as follows:    Brief History:  HPI  89 yo female with pmh of CKD, hTN who presents after being found by police.  She walked out of her house around 1am.  The family was not able to find her.  She reports she walked around and then someone drove her to the [address] [place] [address] and she got on a bus and rode around all day.  Then someone called the police and she was brought here.         Focused Physical Exam:  Physical Exam  Awake and alert, mildly confused  Lungs; cta bl  Neuro: nonfocal      Assessment / Plan:  89 yo female found after wandering all day  Labs  UA  CT head      Medical Decision Making  Amount and/or Complexity of Data Reviewed  Labs: ordered.  Radiology: ordered.  ECG/medicine tests: ordered.    Risk  Decision regarding hospitalization.        I was physically present for the key/critical portions of the following procedures:  Procedures         Caren Carlson MD  07/26/25 0046

## 2025-07-26 NOTE — PLAN OF CARE
Plan of Care Review  Plan of Care Reviewed With: patient  Progress: no change  Outcome Evaluation: Pt. AAO X 2 with some confusion. BG : 309mg/dl, 8units Lispro irdered and was administered. 5ooml Bolu given. Pt c/o chronic R shoulder pain, Voltaren ordered administered and it provided some relief.  Fall  safety precautions maintained.

## 2025-07-26 NOTE — H&P
Hospital Medicine    History & Physical        CHIEF COMPLAINT   Thirst   HISTORY OF PRESENT ILLNESS   Tiki Radford is a 89-year-old female with past medical history for CKD stage IV, polymyalgia rheumatica, IDDM type II, cognitive impairment, asthma, hypertension, COPD, carotid stenosis, hyperlipidemia who was missing from her residence since approximately 1 AM this morning. According to EMS patient was found at the [address] [place] [address]. Patient is a limited historian and is unsure how she got there but did note to the ED that she was given a ride by a stranger and had rode the buses for awhile. Patient noted that she was very thirsty upon arrival and had not eaten or drank anything all day. Patient reports that she did feel lightheaded upon arrival to the ED but feels better now after receiving IV fluids. Family at bedside notes that the patient must have gotten up in the middle of the night and left the home without them knowing. They report that the patient is sometimes confused at night but has never gotten lost like this before. They report at baseline that she is usually oriented to person and place but does not usually know the date or her own birth date. They report that she is able to ambulate with a cane and her daughter Kirstin ([phone] ) is her primary caretaker.        PAST MEDICAL AND SURGICAL HISTORY   No past medical history on file.    No past surgical history on file.    PCP: Rachel Buchanan, DO   MEDICATIONS   Prior to Admission medications   Medication Sig Start Date End Date Taking? Authorizing Provider   aspirin 81 mg enteric coated tablet Take 81 mg by mouth daily.   Yes Provider, Letha Garcia MD   atorvastatin (LIPITOR) 20 mg tablet Take 20 mg by mouth daily.   Yes Provider, Letha Garcia MD   calcium citrate-vitamin D3 500 mg-12.5 mcg (500 unit) tablet,chewable Take 1 tablet by mouth daily.   Yes ProviderLetha MD   dulaglutide (TRULICITY) 1.5 mg/0.5 mL  pen injector Inject 1.5 mg under the skin once a week on Monday. Do not prime. Every Saturday  Hold if blood sugar is less than 240.   Yes ProviderLetha MD   empagliflozin (JARDIANCE) 10 mg tablet Take 10 mg by mouth daily.   Yes ProviderLetha MD   fluticasone-umeclidinium-vilanterol (TRELEGY ELLIPTA) 200-62.5-25 mcg blister with device powder for inhalation Inhale 1 puff daily.   Yes Letha Valdivia MD   insulin glargine U-100 (LANTUS/BASAGLAR) 100 unit/mL (3 mL) pen Inject 15 Units under the skin nightly.   Yes Letha Valdivia MD   ipratropium (ATROVENT) 42 mcg (0.06 %) nasal spray Administer 2 sprays into each nostril 2 (two) times a day.   Yes Letha Valdivia MD   levothyroxine (SYNTHROID) 100 mcg tablet Take 100 mcg by mouth daily.   Yes Letha Valdivia MD   oxybutynin XL (DITROPAN-XL) 10 mg 24 hr tablet Take 10 mg by mouth daily.   Yes Letha Valdivia MD   valsartan (DIOVAN) 320 mg tablet Take 320 mg by mouth daily.   Yes Letha Valdivia MD   docusate sodium (COLACE) 100 mg capsule Take 100 mg by mouth daily as needed for constipation.    ProviderLetha MD   fluticasone propionate (FLONASE) 50 mcg/actuation nasal spray Administer 1 spray into each nostril daily as needed for rhinitis.    ProviderLetha MD   oxyCODONE (ROXICODONE) 5 mg immediate release tablet Take 0.5 tablets (2.5 mg total) by mouth every 4 (four) hours as needed for severe pain for up to 6 doses. 6/1/24 7/25/25  Jennifer Mederos PA C      ALLERGIES   Penicillins and Sulfa (sulfonamide antibiotics)   FAMILY HISTORY   No family history on file.   SOCIAL HISTORY   Social History     Substance and Sexual Activity   Drug Use Not on file     Tobacco Use: Not on file     Alcohol Use: Not on file                REVIEW OF SYSTEMS   All other systems reviewed and negative except as noted in HPI   PHYSICAL EXAMINATION   Temp:  [36.9 °C (98.5 °F)]  36.9 °C (98.5 °F)  Heart Rate:  [] 81  Resp:  [18] 18  BP: (111-150)/(57-67) 150/67  There is no height or weight on file to calculate BMI.    Physical Exam  Constitutional:       Appearance: Normal appearance.   HENT:      Mouth/Throat:      Mouth: Mucous membranes are dry.   Eyes:      Pupils: Pupils are equal, round, and reactive to light.      Comments: Left conjunctiva injected   Cardiovascular:      Rate and Rhythm: Normal rate and regular rhythm.      Pulses: Normal pulses.      Heart sounds: Normal heart sounds.   Pulmonary:      Effort: Pulmonary effort is normal.      Breath sounds: Normal breath sounds.   Abdominal:      Palpations: Abdomen is soft.   Musculoskeletal:         General: Normal range of motion.   Skin:     General: Skin is warm.      Capillary Refill: Capillary refill takes less than 2 seconds.   Neurological:      Mental Status: She is alert. Mental status is at baseline.      Cranial Nerves: No cranial nerve deficit.      Sensory: No sensory deficit.      Motor: No weakness.      Comments: Possible slight facial droop            LABS / IMAGING / EKG   CHEMISTRIES   Results from last 7 days   Lab Units 07/25/25  1721   SODIUM mEQ/L 139   POTASSIUM mEQ/L 4.6   CHLORIDE mEQ/L 106   CO2 mEQ/L 22   BUN mg/dL 40*   CREATININE mg/dL 3.1*   GLUCOSE mg/dL 148*   CALCIUM mg/dL 9.7   EGFR mL/min/1.73m*2 13.3*   ANION GAP mEQ/L 11     HEPATIC FUNCTION TESTS   Results from last 7 days   Lab Units 07/25/25  1721   AST IU/L 23   ALT IU/L 10   ALK PHOS IU/L 70   ALBUMIN g/dL 3.9   PROTEIN TOTAL g/dL 7.6   BILIRUBIN TOTAL mg/dL 0.6     CBC RESULTS   Results from last 7 days   Lab Units 07/25/25  1721   WBC K/uL 8.47   HEMOGLOBIN g/dL 14.2   HEMATOCRIT % 43.7   PLATELETS K/uL 207     LIPIDS     CARDIAC   Results from last 7 days   Lab Units 07/25/25  1721   BNP pg/mL 35       Radiology Reports in last 24hCT HEAD WITHOUT IV CONTRAST  Result Date: 7/25/2025  CLINICAL HISTORY: Memory loss   . COMPARISON:  None available. TECHNIQUE: Routine unenhanced CT scan was performed of the head using departmental protocol. CT DOSE:  One or more dose reduction techniques (e.g. automated exposure control, adjustment of the mA and/or kV according to patient size, use of iterative reconstruction technique) utilized for this examination. COMMENT: There is no evidence of acute intracranial hemorrhage or mass effect. There is no evidence of midline shift. The cisterns and sulci are clear. The gray-white differentiation is unremarkable. There is periventricular, subcortical and deep white matter hypoattenuation in keeping with chronic small vessel ischemic changes. There is diffuse cortical atrophy. There is no large territorial infarct.  However, MRI is more sensitive for the evaluation of acute ischemia. The deep gray nuclei, brainstem and cerebellum are otherwise unremarkable. The ventricles and extra-axial spaces demonstrate no acute abnormalities. There is mild partial opacification of the paranasal sinuses.  The visualized mastoid air cells are clear. The orbits and visualized parapharyngeal soft tissues are unremarkable. The bony calvarium is intact.     IMPRESSION: No acute intracranial hemorrhage, large territorial infarct, mass effect or midline shift is identified.      EKG: I have independently reviewed the ECG. Significant findings include Sinus Tachycardia at 100 BPM with LAD, RBBB, no acute ischemic changes.   ASSESSMENT AND PLAN     # RODOLFO (acute kidney injury) (CMS/HCC) on CKD IV  Patient presenting after wandering out of her home likely confused. Cr on admission 3 with a baseline. Notably dry on exam. Had not eaten or drank all day. RODOLFO on CKD likely pre-renal in setting of no oral intake for the past 24 hrs while outside in hot ambient temperatures.  - BUN/Cr: 12.9 - post-renal or normal (7/25/25)  - creatinine: 3.1 (7/25/25)  - eGFR: 13.3 (7/25/25)  - est Cr baseline: 2.00 mg/dL, eGFR: 20-25  - continue home:  calcium-vitamin D tablet 500 mg-5 mcg 2 tabs po daily  - holding: valsartan 320 mg po  - start: sodium chloride 0.9 % bolus 4500 mg iv once (due 07/26/25)  - s/p 1 L in ED  - trend Cr  - monitor UOP  - avoid nephrotoxins     # Possible left sided facial droop  # Carotid Stenosis s/p right sided CEA  - mental status appears at baseline per family  - CTH without acute intracranial abnormality  - US carotid on 7/7 with 50-70% stenosis of L ICA  - No focal neurodeficits on exam except questionable mild left sided facial droop (family noted)  - discussed case with neurology who recommends MRI brain (given unknown duration and questional small deficit with otherwise normal neuro exam would not be candidate for TNK)  - consult Neurology depending on results of MRI brain  - continue ASA + Statin     # Elevated CK  Likely in setting of patient being found after lost all day with heat exposure. No significant muscle pain on exam or evidence of trauma. UA with yellow colored urine.  - trend CK  - IVF as needed     # Type II DM  - continue home: insulin glargine 15 units sc daily  - holding home: dulaglutide 3 mg/mL 1.5 mg sc  - holding: empagliflozin 10 mg po  - start: insulin lispro 3 - 5 units sc TID (due 07/26/25)        # Hypertension  - 24hr BP range: (111-162) / (57-89) (7/25/25)  - holding: valsartan 320 mg po     # Obesity, class I  - BMI: 30.1 (7/25/25)  - complicated by hypertension  - holding home: dulaglutide 3 mg/mL 1.5 mg sc           VTE Assessment: Pad[place]    VTE Prophylaxis: Current anticoagulants:  [START ON 7/26/2025] heparin (porcine) 5,000 unit/mL injection 5,000 Units, subcutaneous, q8h DECLAN      Code Status: Full Code      Discussed advanced care planning. The patient was not able or did not want to name a surrogate decision maker. Tiki was unable to provide an ACP.  Estimated Discharge Date: 7/27/2025  Disposition Planning: pending clinical improvement     Gentry Ulloa MD  7/25/2025

## 2025-07-26 NOTE — PROGRESS NOTES
Hospital Medicine     Daily Progress Note       SUBJECTIVE   Feels somewhat better  Oriented x2  Describes incontinence and urgency and polyuria  Does not remember events from day prior     OBJECTIVE   Vital signs in last 24 hours:  Temp:  [36.5 °C (97.7 °F)-36.9 °C (98.5 °F)] 36.8 °C (98.3 °F)  Heart Rate:  [] 83  Resp:  [16-18] 16  BP: (111-162)/(56-89) 118/56  No intake or output data in the 24 hours ending 07/26/25 1306    Physical Exam  Vitals:    07/26/25 0730   BP: (!) 118/56   Pulse: 83   Resp: 16   Temp: 36.8 °C (98.3 °F)   SpO2: 96%     Constitutional:       Appearance: Normal appearance.   HENT:      Mouth/Throat:      Mouth: Mucous membranes are dry.   Eyes:      Pupils: Pupils are equal, round, and reactive to light.      Cardiovascular:      Rate and Rhythm: Normal rate and regular rhythm.      Pulses: Normal pulses.      Heart sounds: Normal heart sounds.   Pulmonary:      Effort: Pulmonary effort is normal.      Breath sounds: Normal breath sounds.   Abdominal:      Palpations: Abdomen is soft.   Musculoskeletal:         General: Normal range of motion.   Skin:     General: Skin is warm.      Capillary Refill: Capillary refill takes less than 2 seconds.   Neurological:      Mental Status: She is alert. Mental status is at baseline.      Cranial Nerves: No cranial nerve deficit.      Sensory: No sensory deficit.      Motor: No weakness.         LINES, DRAINS, AIRWAYS, WOUNDS   Peripheral IV (Adult) 07/25/25 Anterior;Right Forearm (Active)   Number of days: 1          LABS, IMAGING, TELEMETRY   CHEMISTRIES   Results from last 7 days   Lab Units 07/26/25  0607 07/25/25  1721   SODIUM mEQ/L 142 139   POTASSIUM mEQ/L 4.1 4.6   CHLORIDE mEQ/L 113* 106   CO2 mEQ/L 22 22   BUN mg/dL 40* 40*   CREATININE mg/dL 2.3* 3.1*   GLUCOSE mg/dL 218* 148*   CALCIUM mg/dL 8.4* 9.7   EGFR mL/min/1.73m*2 19.0* 13.3*   ANION GAP mEQ/L 7 11   MAGNESIUM mg/dL 2.2  --      HEPATIC FUNCTION TESTS    Results from last 7 days   Lab Units 07/25/25  1721   AST IU/L 23   ALT IU/L 10   ALK PHOS IU/L 70   ALBUMIN g/dL 3.9   PROTEIN TOTAL g/dL 7.6   BILIRUBIN TOTAL mg/dL 0.6     CBC RESULTS   Results from last 7 days   Lab Units 07/26/25  0607 07/25/25  1721   WBC K/uL 6.18 8.47   HEMOGLOBIN g/dL 12.8 14.2   HEMATOCRIT % 39.4 43.7   PLATELETS K/uL 166 207     ANEMIA STUDIES     COAGULATION       Radiology Reports in last 24hMRI BRAIN WITHOUT CONTRAST  Result Date: 7/26/2025  CLINICAL HISTORY:   Mental status change, unknown cause COMMENT: MRI images of the brain were obtained. Administered contrast and dose: [<None>] Comparison: Noncontrast CT 7/25/2025. The ventricular system and sulci reflect mild to moderate global volume loss. No midline shift or mass effect.  Moderate scattered and confluent periventricular and subcortical white matter T2 hyperintensity.  Tiny to small chronic bilateral cerebellar infarcts.  The major arterial flow voids are preserved in the skull base.  Mild bilateral maxillary and ethmoid sinus mucosal thickening.  Grossly clear mastoid air cells.  Thinning of the lenses of the globes bilaterally.  On gradient sequences, no abnormalities to suggest hemosiderin deposition.  The osseous calvarium has age-appropriate marrow signal.  Incidental degenerative change of the partially visualized upper cervical spine.  On diffusion sequences, no abnormalities to suggest.     IMPRESSION:  No acute intracranial lesion Atrophy and nonspecific white matter T2 hyperintensities likely secondary to chronic small vessel ischemia.  Bilateral chronic cerebellar infarcts.    X-RAY CHEST 2 VIEWS  Result Date: 7/26/2025  CLINICAL HISTORY: Shortness of breath COMMENT: AP and lateral views of the chest show clear lung fields.  The heart, pulmonary vasculature and mediastinum are within normal limits. Comparison is made to previous study dated 5/31/2024     IMPRESSION: No active disease is seen in the chest.    CT  HEAD WITHOUT IV CONTRAST  Result Date: 7/25/2025  CLINICAL HISTORY: Memory loss   . COMPARISON: None available. TECHNIQUE: Routine unenhanced CT scan was performed of the head using departmental protocol. CT DOSE:  One or more dose reduction techniques (e.g. automated exposure control, adjustment of the mA and/or kV according to patient size, use of iterative reconstruction technique) utilized for this examination. COMMENT: There is no evidence of acute intracranial hemorrhage or mass effect. There is no evidence of midline shift. The cisterns and sulci are clear. The gray-white differentiation is unremarkable. There is periventricular, subcortical and deep white matter hypoattenuation in keeping with chronic small vessel ischemic changes. There is diffuse cortical atrophy. There is no large territorial infarct.  However, MRI is more sensitive for the evaluation of acute ischemia. The deep gray nuclei, brainstem and cerebellum are otherwise unremarkable. The ventricles and extra-axial spaces demonstrate no acute abnormalities. There is mild partial opacification of the paranasal sinuses.  The visualized mastoid air cells are clear. The orbits and visualized parapharyngeal soft tissues are unremarkable. The bony calvarium is intact.     IMPRESSION: No acute intracranial hemorrhage, large territorial infarct, mass effect or midline shift is identified.         ASSESSMENT AND PLAN   # RODOLFO (acute kidney injury) (CMS/[facility]) on CKD IV  Patient presenting after wandering out of her home likely confused. Cr on admission 3 with a baseline. Notably dry on exam. Had not eaten or drank all day. RODOLFO on CKD likely pre-renal in setting of no oral intake for the past 24 hrs while outside in hot ambient temperatures, complicating likely cystitis    - BUN/Cr: 12.9 - post-renal or normal (7/25/25)  - creatinine: 3.1 (7/25/25), improved to 2.3  - eGFR: 13.3 (7/25/25)  - est Cr baseline: 2.00 mg/dL, eGFR: 20-25  - continue home:  calcium-vitamin D tablet 500 mg-5 mcg 2 tabs po daily  - holding: valsartan 320 mg po  - start: sodium chloride 0.9 % bolus 4500 mg iv once (due 25)  - cont IVF crystalloids 100 cc/hr for 24 hours      #Simple cystitis  - f/u urine cx  - UA +  - start Cipro 500 mg qd for 5 days. Has Hives to penicillins. Not challenged with cephalosporin. Not ideal choice given her age but otherwise limited   - QTC wnl     # Possible left sided facial droop  # Carotid Stenosis s/p right sided CEA  - mental status appears at baseline per family  - CTH without acute intracranial abnormality  - US carotid on  with 50-70% stenosis of L ICA  - No focal neurodeficits on exam except questionable mild left sided facial droop (family noted)  - MRI was obtained. Shows small vessel age related injuries. 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  - continue ASA + Statin     # Elevated CK  Likely in setting of patient being found after lost all day with heat exposure. No significant muscle pain on exam or evidence of trauma. UA with yellow colored urine.  - trend CK  - IVF as needed     # Type II DM  - continue home: insulin glargine 15 units sc daily  - holding home: dulaglutide 3 mg/mL 1.5 mg sc  - holding: empagliflozin 10 mg po  - start: insulin lispro 3 - 5 units sc TID (due 25)        # Hypertension  - 24hr BP range: (111-162) / (57-89) (25)  - holding: valsartan 320 mg po     # Obesity, class I  - BMI: 30.1 (25)  - complicated by hypertension  - holding home: dulaglutide 3 mg/mL 1.5 mg sc    #Goals of care  - discussed with pt and granddaughter at bedside  - given age recommended against full code, family will discuss       VTE Prophylaxis:  Current anticoagulants:  heparin (porcine) 5,000 unit/mL injection 5,000 Units, subcutaneous, q8h DECLAN      Code Status: Full Code        Estimated Discharge Date: 2025   Disposition Plannin25       Pancho Herrmann MD  2025

## 2025-07-27 VITALS
DIASTOLIC BLOOD PRESSURE: 65 MMHG | HEIGHT: 62 IN | OXYGEN SATURATION: 98 % | SYSTOLIC BLOOD PRESSURE: 115 MMHG | RESPIRATION RATE: 16 BRPM | WEIGHT: 164.2 LBS | TEMPERATURE: 98.1 F | BODY MASS INDEX: 30.22 KG/M2 | HEART RATE: 73 BPM

## 2025-07-27 PROBLEM — Z79.4 TYPE 2 DIABETES MELLITUS WITH HYPERGLYCEMIA, WITH LONG-TERM CURRENT USE OF INSULIN (CMS/HCC): Status: ACTIVE | Noted: 2025-07-27

## 2025-07-27 PROBLEM — I10 PRIMARY HYPERTENSION: Status: ACTIVE | Noted: 2025-07-27

## 2025-07-27 PROBLEM — E11.65 TYPE 2 DIABETES MELLITUS WITH HYPERGLYCEMIA, WITH LONG-TERM CURRENT USE OF INSULIN (CMS/HCC): Status: ACTIVE | Noted: 2025-07-27

## 2025-07-27 LAB
ANION GAP SERPL CALC-SCNC: 6 MEQ/L (ref 3–15)
ATRIAL RATE: 77
ATRIAL RATE: 80
BACTERIA UR CULT: NORMAL
BACTERIA UR CULT: NORMAL
BUN SERPL-MCNC: 28 MG/DL (ref 7–25)
CALCIUM SERPL-MCNC: 8.7 MG/DL (ref 8.6–10.3)
CHLORIDE SERPL-SCNC: 108 MEQ/L (ref 98–107)
CK SERPL-CCNC: 516 U/L (ref 30–223)
CO2 SERPL-SCNC: 23 MEQ/L (ref 21–31)
CREAT SERPL-MCNC: 1.7 MG/DL (ref 0.6–1.2)
EGFRCR SERPLBLD CKD-EPI 2021: 27.3 ML/MIN/1.73M*2
GLUCOSE BLD-MCNC: 191 MG/DL (ref 70–99)
GLUCOSE BLD-MCNC: 233 MG/DL (ref 70–99)
GLUCOSE BLD-MCNC: 317 MG/DL (ref 70–99)
GLUCOSE BLD-MCNC: 88 MG/DL (ref 70–99)
GLUCOSE SERPL-MCNC: 239 MG/DL (ref 70–99)
P AXIS: 61
P AXIS: 63
POCT TEST: ABNORMAL
POCT TEST: NORMAL
POTASSIUM SERPL-SCNC: 4.9 MEQ/L (ref 3.5–5.1)
PR INTERVAL: 204
PR INTERVAL: 210
QRS DURATION: 132
QRS DURATION: 134
QT INTERVAL: 404
QT INTERVAL: 408
QTC CALCULATION(BAZETT): 461
QTC CALCULATION(BAZETT): 465
R AXIS: -64
R AXIS: -69
SODIUM SERPL-SCNC: 137 MEQ/L (ref 136–145)
T WAVE AXIS: 17
T WAVE AXIS: 32
VENTRICULAR RATE: 77
VENTRICULAR RATE: 80

## 2025-07-27 PROCEDURE — 63700000 HC SELF-ADMINISTRABLE DRUG: Performed by: HOSPITALIST

## 2025-07-27 PROCEDURE — 99233 SBSQ HOSP IP/OBS HIGH 50: CPT | Performed by: HOSPITALIST

## 2025-07-27 PROCEDURE — 63600000 HC DRUGS/DETAIL CODE

## 2025-07-27 PROCEDURE — 63700000 HC SELF-ADMINISTRABLE DRUG

## 2025-07-27 PROCEDURE — 63600000 HC DRUGS/DETAIL CODE: Mod: JZ | Performed by: STUDENT IN AN ORGANIZED HEALTH CARE EDUCATION/TRAINING PROGRAM

## 2025-07-27 PROCEDURE — 36415 COLL VENOUS BLD VENIPUNCTURE: CPT | Performed by: HOSPITALIST

## 2025-07-27 PROCEDURE — 93010 ELECTROCARDIOGRAM REPORT: CPT | Mod: 76 | Performed by: INTERNAL MEDICINE

## 2025-07-27 PROCEDURE — 80048 BASIC METABOLIC PNL TOTAL CA: CPT | Performed by: HOSPITALIST

## 2025-07-27 PROCEDURE — 82550 ASSAY OF CK (CPK): CPT | Performed by: HOSPITALIST

## 2025-07-27 PROCEDURE — 63700000 HC SELF-ADMINISTRABLE DRUG: Performed by: STUDENT IN AN ORGANIZED HEALTH CARE EDUCATION/TRAINING PROGRAM

## 2025-07-27 PROCEDURE — 12000000 HC ROOM AND CARE MED/SURG

## 2025-07-27 RX ORDER — VALSARTAN 160 MG/1
320 TABLET ORAL DAILY
Status: DISCONTINUED | OUTPATIENT
Start: 2025-07-27 | End: 2025-07-28 | Stop reason: HOSPADM

## 2025-07-27 RX ADMIN — MOMETASONE FUROATE AND FORMOTEROL FUMARATE DIHYDRATE 2 PUFF: 200; 5 AEROSOL RESPIRATORY (INHALATION) at 08:07

## 2025-07-27 RX ADMIN — ATORVASTATIN CALCIUM 20 MG: 20 TABLET, FILM COATED ORAL at 08:04

## 2025-07-27 RX ADMIN — INSULIN LISPRO 5 UNITS: 100 INJECTION, SOLUTION INTRAVENOUS; SUBCUTANEOUS at 12:22

## 2025-07-27 RX ADMIN — POLYETHYLENE GLYCOL 3350 17 G: 17 POWDER, FOR SOLUTION ORAL at 08:04

## 2025-07-27 RX ADMIN — SODIUM CHLORIDE, POTASSIUM CHLORIDE, SODIUM LACTATE AND CALCIUM CHLORIDE: 600; 310; 30; 20 INJECTION, SOLUTION INTRAVENOUS at 12:27

## 2025-07-27 RX ADMIN — HEPARIN SODIUM 5000 UNITS: 5000 INJECTION, SOLUTION INTRAVENOUS; SUBCUTANEOUS at 06:24

## 2025-07-27 RX ADMIN — INSULIN GLARGINE 15 UNITS: 100 INJECTION, SOLUTION SUBCUTANEOUS at 21:19

## 2025-07-27 RX ADMIN — TIOTROPIUM BROMIDE INHALATION SPRAY 2 PUFF: 3.12 SPRAY, METERED RESPIRATORY (INHALATION) at 08:07

## 2025-07-27 RX ADMIN — MOMETASONE FUROATE AND FORMOTEROL FUMARATE DIHYDRATE 2 PUFF: 200; 5 AEROSOL RESPIRATORY (INHALATION) at 20:07

## 2025-07-27 RX ADMIN — INSULIN LISPRO 3 UNITS: 100 INJECTION, SOLUTION INTRAVENOUS; SUBCUTANEOUS at 17:13

## 2025-07-27 RX ADMIN — VALSARTAN 320 MG: 160 TABLET, FILM COATED ORAL at 12:22

## 2025-07-27 RX ADMIN — LEVOTHYROXINE SODIUM 100 MCG: 0.1 TABLET ORAL at 06:24

## 2025-07-27 RX ADMIN — HEPARIN SODIUM 5000 UNITS: 5000 INJECTION, SOLUTION INTRAVENOUS; SUBCUTANEOUS at 20:27

## 2025-07-27 RX ADMIN — Medication 2 TABLET: at 08:04

## 2025-07-27 RX ADMIN — HEPARIN SODIUM 5000 UNITS: 5000 INJECTION, SOLUTION INTRAVENOUS; SUBCUTANEOUS at 13:38

## 2025-07-27 RX ADMIN — CIPROFLOXACIN 500 MG: 500 TABLET ORAL at 08:04

## 2025-07-27 RX ADMIN — ASPIRIN 81 MG: 81 TABLET, COATED ORAL at 08:04

## 2025-07-27 RX ADMIN — OXYBUTYNIN CHLORIDE 10 MG: 5 TABLET, EXTENDED RELEASE ORAL at 08:04

## 2025-07-27 RX ADMIN — Medication 2 DROP: at 08:04

## 2025-07-27 RX ADMIN — Medication 2 DROP: at 20:04

## 2025-07-27 ASSESSMENT — COGNITIVE AND FUNCTIONAL STATUS - GENERAL
MOVING TO AND FROM BED TO CHAIR: 3 - A LITTLE
CLIMB 3 TO 5 STEPS WITH RAILING: 3 - A LITTLE
WALKING IN HOSPITAL ROOM: 3 - A LITTLE
STANDING UP FROM CHAIR USING ARMS: 3 - A LITTLE
WALKING IN HOSPITAL ROOM: 3 - A LITTLE
STANDING UP FROM CHAIR USING ARMS: 3 - A LITTLE
CLIMB 3 TO 5 STEPS WITH RAILING: 2 - A LOT
MOVING TO AND FROM BED TO CHAIR: 3 - A LITTLE

## 2025-07-27 NOTE — PLAN OF CARE
Plan of Care Review  Plan of Care Reviewed With: patient  Progress: no change  Outcome Evaluation: Awake and alert. Reporting she feel wet- Purewick removed- cleansed pt and new purewick placed. Denies pain. VSS. FSP maintained. Call bell use review w pt.

## 2025-07-27 NOTE — PROGRESS NOTES
Hospital Medicine     Daily Progress Note       SUBJECTIVE   Resting comfortably, sitting up in bed eating breakfast. Denies any new complaints. Daughter is at bedside and updated on plan of care.      OBJECTIVE   Vital signs in last 24 hours:  Temp:  [36.7 °C (98.1 °F)-36.8 °C (98.3 °F)] 36.8 °C (98.3 °F)  Heart Rate:  [80-82] 82  Resp:  [16] 16  BP: (125-126)/(60-71) 125/60    Intake/Output Summary (Last 24 hours) at 7/27/2025 0735  Last data filed at 7/27/2025 0615  Gross per 24 hour   Intake 1460 ml   Output 2000 ml   Net -540 ml       Physical Exam  Vitals and nursing note reviewed.   Constitutional:       General: She is not in acute distress.     Appearance: She is well-developed.   HENT:      Head: Normocephalic and atraumatic.   Eyes:      Conjunctiva/sclera: Conjunctivae normal.   Cardiovascular:      Rate and Rhythm: Normal rate and regular rhythm.      Heart sounds: Normal heart sounds. No murmur heard.  Pulmonary:      Effort: Pulmonary effort is normal. No respiratory distress.   Abdominal:      General: There is no distension.      Palpations: Abdomen is soft.      Tenderness: There is no abdominal tenderness.   Skin:     General: Skin is warm and dry.   Neurological:      Mental Status: She is alert.      Cranial Nerves: No cranial nerve deficit.      Comments: Oriented to self, place but not to time        LINES, DRAINS, AIRWAYS, WOUNDS   Peripheral IV (Adult) 07/25/25 Anterior;Right Forearm (Active)   Number of days: 2        LABS, IMAGING, TELEMETRY   I have reviewed the pt's pertinent labs.No new clinical concerns.    CHEMISTRIES   Results from last 7 days   Lab Units 07/27/25  1017 07/26/25  0607 07/25/25  1721   SODIUM mEQ/L 137 142 139   POTASSIUM mEQ/L 4.9 4.1 4.6   CHLORIDE mEQ/L 108* 113* 106   CO2 mEQ/L 23 22 22   BUN mg/dL 28* 40* 40*   CREATININE mg/dL 1.7* 2.3* 3.1*   GLUCOSE mg/dL 239* 218* 148*   CALCIUM mg/dL 8.7 8.4* 9.7   EGFR mL/min/1.73m*2 27.3* 19.0* 13.3*   ANION  GAP mEQ/L 6 7 11   MAGNESIUM mg/dL  --  2.2  --      CBC RESULTS   Results from last 7 days   Lab Units 07/26/25  0607 07/25/25  1721   WBC K/uL 6.18 8.47   HEMOGLOBIN g/dL 12.8 14.2   HEMATOCRIT % 39.4 43.7   PLATELETS K/uL 166 207     Results from last 7 days   Lab Units 07/27/25  1017 07/26/25  0607 07/25/25  1721   CK TOTAL U/L 516* 1,082* 568*     Radiology Reports in last 24h:    MRI Brain    No acute intracranial lesion     Atrophy and nonspecific white matter T2 hyperintensities likely secondary to   chronic small vessel ischemia.  Bilateral chronic cerebellar infarcts.     Not on telemetry.     ASSESSMENT AND PLAN     89F with history of CKD IV, carotid stenosis s/p right CEA, type II DM, hypertension, and class I obesity, presents with RODOLFO likely pre-renal due to dehydration and possible left-sided facial droop.     # RODOLFO on CKD IV  Patient presenting after wandering out of her home likely confused. Cr on admission 3 with a baseline 1.7 - 1.9. Notably dry on exam on presentation. Had not eaten or drank all day. RODOLFO on CKD likely pre-renal in setting of no oral intake for the past 24 hrs while outside in hot ambient temperatures.   - creatinine: 3.1 (7/25/25), improved to 1.7 today (back to baseline range)  - Resume ARB today  - Monitor renal function off of IVF  - Avoid nephrotoxins, renally dose meds     # Concern for UTI  - UA appears c/w contamination. Urine culture w/ mixed gram positives c/w contamination  - Stop antibiotics and monitor clinically     # Possible left sided facial droop  # Carotid Stenosis s/p right sided CEA  - mental status appears at baseline per family  - CTH without acute intracranial abnormality  - US carotid on 7/7 with 50-70% stenosis of L ICA  - No focal neurodeficits on exam except questionable mild left sided facial droop (family noted)  - MRI was obtained. Shows small vessel age related injuries. 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  - continue ASA + Statin     # Elevated CK  - Likely  in setting of patient being found after lost all day with heat exposure. No significant muscle pain on exam or evidence of trauma. UA with yellow colored urine.  - trend CK  - Monitor off of IVF     # Type II DM  - Glucose running high, 200's - 300's today. Add no concentrated sweets restriction to diet. If still high in next 24 hours, consider increasing Lantus dose  - continue home: insulin glargine 15 units sc daily  - Would stop SGLT2i - not recommended for use with her level of renal impairment for glycemic control  - holding home: dulaglutide 3 mg/mL 1.5 mg sc  - currently on: insulin lispro 3 - 5 units sc TID     # Hypertension  - 24hr BP range: (125-171) / (60-74) (7/27/25)  - Resume valsartan today and monitor BP trend     # Obesity, class I  - BMI: 30.1 (7/25/25)  - complicated by diabetes mellitus and hypertension  - holding home: dulaglutide 3 mg/mL 1.5 mg sc     # Rhabdomyolysis  - creatine kinase: 516 U/L (7/27/25) down from 1082 U/L (7/26/25)  - Monitor CPK again tomorrow off of IVF     # Hypocalcemia     # Goals of care  - discussed with pt and granddaughter at bedside  - given age recommended against full code, family will discuss          VTE Prophylaxis:  Current anticoagulants:  heparin (porcine) 5,000 unit/mL injection 5,000 Units, subcutaneous, q8h DECLAN      Code Status: Full Code        Estimated Discharge Date: 7/28/2025   Disposition Planning: PT/OT to see for aditional dispo planning       Ita Lopez MD  7/27/2025

## 2025-07-27 NOTE — PLAN OF CARE
Plan of Care Review  Plan of Care Reviewed With: patient, caregiver  Progress: improving  Outcome Evaluation: Patient alert and oriented times person and place. OOB to chair with standy by assistance. Tolerating meal. Family at bedside. Purewick intact. Safety alarm used while in chair and bed. Iv fluid discontinued.

## 2025-07-28 VITALS
TEMPERATURE: 98.4 F | HEART RATE: 86 BPM | WEIGHT: 164.2 LBS | HEIGHT: 62 IN | BODY MASS INDEX: 30.22 KG/M2 | OXYGEN SATURATION: 98 % | DIASTOLIC BLOOD PRESSURE: 80 MMHG | SYSTOLIC BLOOD PRESSURE: 172 MMHG | RESPIRATION RATE: 17 BRPM

## 2025-07-28 PROBLEM — M35.3 PMR (POLYMYALGIA RHEUMATICA) (CMS/HCC): Status: ACTIVE | Noted: 2025-07-28

## 2025-07-28 PROBLEM — E11.9 TYPE 2 DIABETES MELLITUS (CMS/HCC): Status: ACTIVE | Noted: 2025-07-28

## 2025-07-28 PROBLEM — R41.89 COGNITIVE IMPAIRMENT: Status: ACTIVE | Noted: 2025-07-28

## 2025-07-28 LAB
ANION GAP SERPL CALC-SCNC: 6 MEQ/L (ref 3–15)
BUN SERPL-MCNC: 27 MG/DL (ref 7–25)
CALCIUM SERPL-MCNC: 8.9 MG/DL (ref 8.6–10.3)
CHLORIDE SERPL-SCNC: 107 MEQ/L (ref 98–107)
CK SERPL-CCNC: 289 U/L (ref 30–223)
CO2 SERPL-SCNC: 27 MEQ/L (ref 21–31)
CREAT SERPL-MCNC: 1.7 MG/DL (ref 0.6–1.2)
EGFRCR SERPLBLD CKD-EPI 2021: 27.3 ML/MIN/1.73M*2
GLUCOSE BLD-MCNC: 144 MG/DL (ref 70–99)
GLUCOSE BLD-MCNC: 209 MG/DL (ref 70–99)
GLUCOSE SERPL-MCNC: 115 MG/DL (ref 70–99)
POCT TEST: ABNORMAL
POCT TEST: ABNORMAL
POTASSIUM SERPL-SCNC: 4.6 MEQ/L (ref 3.5–5.1)
SODIUM SERPL-SCNC: 140 MEQ/L (ref 136–145)

## 2025-07-28 PROCEDURE — 97161 PT EVAL LOW COMPLEX 20 MIN: CPT | Mod: GP

## 2025-07-28 PROCEDURE — 63600000 HC DRUGS/DETAIL CODE

## 2025-07-28 PROCEDURE — 63700000 HC SELF-ADMINISTRABLE DRUG: Performed by: STUDENT IN AN ORGANIZED HEALTH CARE EDUCATION/TRAINING PROGRAM

## 2025-07-28 PROCEDURE — 63700000 HC SELF-ADMINISTRABLE DRUG

## 2025-07-28 PROCEDURE — 80048 BASIC METABOLIC PNL TOTAL CA: CPT | Performed by: HOSPITALIST

## 2025-07-28 PROCEDURE — 97535 SELF CARE MNGMENT TRAINING: CPT | Mod: GO

## 2025-07-28 PROCEDURE — 36415 COLL VENOUS BLD VENIPUNCTURE: CPT | Performed by: HOSPITALIST

## 2025-07-28 PROCEDURE — 63700000 HC SELF-ADMINISTRABLE DRUG: Performed by: HOSPITALIST

## 2025-07-28 PROCEDURE — 82550 ASSAY OF CK (CPK): CPT | Performed by: HOSPITALIST

## 2025-07-28 PROCEDURE — 97116 GAIT TRAINING THERAPY: CPT | Mod: GP

## 2025-07-28 PROCEDURE — 99239 HOSP IP/OBS DSCHRG MGMT >30: CPT | Performed by: INTERNAL MEDICINE

## 2025-07-28 PROCEDURE — 97166 OT EVAL MOD COMPLEX 45 MIN: CPT | Mod: GO

## 2025-07-28 RX ORDER — CIPROFLOXACIN 500 MG/1
500 TABLET, FILM COATED ORAL DAILY
Status: CANCELLED | OUTPATIENT
Start: 2025-07-28 | End: 2025-07-29

## 2025-07-28 RX ADMIN — ASPIRIN 81 MG: 81 TABLET, COATED ORAL at 09:01

## 2025-07-28 RX ADMIN — HEPARIN SODIUM 5000 UNITS: 5000 INJECTION, SOLUTION INTRAVENOUS; SUBCUTANEOUS at 06:13

## 2025-07-28 RX ADMIN — VALSARTAN 320 MG: 160 TABLET, FILM COATED ORAL at 09:01

## 2025-07-28 RX ADMIN — OXYBUTYNIN CHLORIDE 10 MG: 5 TABLET, EXTENDED RELEASE ORAL at 09:01

## 2025-07-28 RX ADMIN — ATORVASTATIN CALCIUM 20 MG: 20 TABLET, FILM COATED ORAL at 09:01

## 2025-07-28 RX ADMIN — POLYETHYLENE GLYCOL 3350 17 G: 17 POWDER, FOR SOLUTION ORAL at 09:00

## 2025-07-28 RX ADMIN — MOMETASONE FUROATE AND FORMOTEROL FUMARATE DIHYDRATE 2 PUFF: 200; 5 AEROSOL RESPIRATORY (INHALATION) at 09:02

## 2025-07-28 RX ADMIN — Medication 2 DROP: at 09:00

## 2025-07-28 RX ADMIN — Medication 2 TABLET: at 09:01

## 2025-07-28 RX ADMIN — TIOTROPIUM BROMIDE INHALATION SPRAY 2 PUFF: 3.12 SPRAY, METERED RESPIRATORY (INHALATION) at 09:02

## 2025-07-28 RX ADMIN — LEVOTHYROXINE SODIUM 100 MCG: 0.1 TABLET ORAL at 06:15

## 2025-07-28 ASSESSMENT — COGNITIVE AND FUNCTIONAL STATUS - GENERAL
HELP NEEDED FOR PERSONAL GROOMING: 4 - NONE
WALKING IN HOSPITAL ROOM: 3 - A LITTLE
STANDING UP FROM CHAIR USING ARMS: 3 - A LITTLE
STANDING UP FROM CHAIR USING ARMS: 3 - A LITTLE
MOVING TO AND FROM BED TO CHAIR: 3 - A LITTLE
CLIMB 3 TO 5 STEPS WITH RAILING: 3 - A LITTLE
DRESSING REGULAR UPPER BODY CLOTHING: 3 - A LITTLE
CLIMB 3 TO 5 STEPS WITH RAILING: 3 - A LITTLE
HELP NEEDED FOR BATHING: 3 - A LITTLE
TOILETING: 3 - A LITTLE
AFFECT: WFL;FLAT/BLUNTED AFFECT
MOVING TO AND FROM BED TO CHAIR: 3 - A LITTLE
EATING MEALS: 4 - NONE
AFFECT: WFL
DRESSING REGULAR LOWER BODY CLOTHING: 3 - A LITTLE
WALKING IN HOSPITAL ROOM: 3 - A LITTLE

## 2025-07-28 NOTE — DISCHARGE INSTRUCTIONS
Dear Ms. Radford,    You were admitted to [facility] due to . You were found to have an elevation in your kidney function that was likely due to dehydration. Your kidney function returned to baseline after you received IV fluids. You received some antibiotics for a possible UTI, however the urine cultures were indeterminate for a UTI.     Medications: Please see your medication list for updated doses, new, and discontinued medications. If you have any questions regarding your home medications please, you are advised to discuss them with your primary care provider.     Follow ups and Instructions:  [ ] Please follow up with your PCP within 1-2 weeks  [ ] Please repeat labs within 3-4 days to monitor     Please bring this discharge paperwork to all of your follow up appointments. If you experience recurrence of your symptoms, please do not hesitate to call your primary care doctor or present to the emergency department for evaluation. It was a pleasure taking care of you at [facility]! We wish you the best of health.

## 2025-07-28 NOTE — DISCHARGE SUMMARY
Hospital Medicine    Inpatient Discharge Summary        BRIEF OVERVIEW   Patient: Tiki Radford  Admission Date: 2025   : 1928 Discharge Date: 2025       PCP: Rachel Buchanan DO  Disposition: Home     Destination:       Attending Provider: No att. providers found Attending phys phone: N/A    Code Status At Discharge: Full Code         ASSESSMENT AND PLAN   Problem List on the Day of Discharge  # RODOLFO on CKD IV  Patient presenting after wandering out of her home likely confused. Cr on admission 3 with a baseline 1.7 - 1.9. Notably dry on exam on presentation. Had not eaten or drank all day. RODOLFO on CKD likely pre-renal in setting of no oral intake for the past 24 hrs while outside in hot ambient temperatures.   - creatinine: 3.1 (25), improved to 1.7 today (back to baseline range)    # Concern for UTI  - UA appears c/w contamination. Urine culture w/ mixed gram positives c/w contamination  - Stop antibiotics and monitor clinically     # Possible left sided facial droop  # Carotid Stenosis s/p right sided CEA  - mental status appears at baseline per family  - CTH without acute intracranial abnormality  - US carotid on  with 50-70% stenosis of L ICA  - No focal neurodeficits on exam except questionable mild left sided facial droop (family noted)  - MRI was obtained. Shows small vessel age related injuries. 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  - continue ASA + Statin     # Elevated CK  - Likely in setting of patient being found after lost all day with heat exposure. No significant muscle pain on exam or evidence of trauma. UA with yellow colored urine.  - Improved      # Type II DM  - continue home: insulin glargine 15 units sc daily     # Hypertension  - 24hr BP range: (125-171) / (60-74) (25)  - Resume home alsartan     # Obesity, class I  - BMI: 30.1 (25)  - complicated by diabetes mellitus and hypertension     # Rhabdomyolysis, non traumatic   -Improved     Presenting  Problem/History of Present Illness  Per H&P:    Tiki Radford is a 89-year-old female with past medical history for CKD stage IV, polymyalgia rheumatica, IDDM type II, cognitive impairment, asthma, hypertension, COPD, carotid stenosis, hyperlipidemia who was missing from her residence since approximately 1 AM this morning. According to EMS patient was found at the [address] [place] [address]. Patient is a limited historian and is unsure how she got there but did note to the ED that she was given a ride by a stranger and had rode the buses for awhile. Patient noted that she was very thirsty upon arrival and had not eaten or drank anything all day. Patient reports that she did feel lightheaded upon arrival to the ED but feels better now after receiving IV fluids. Family at bedside notes that the patient must have gotten up in the middle of the night and left the home without them knowing. They report that the patient is sometimes confused at night but has never gotten lost like this before. They report at baseline that she is usually oriented to person and place but does not usually know the date or her own birth date. They report that she is able to ambulate with a cane and her daughter Kirstin ([phone] ) is her primary caretaker.      Hospital Course    On admission, patient treated for RODOLFO likely in the setting of dehydration.  She received IV fluids with improvement of her renal function with creatinine returning to baseline at 1.7.  UCX with mixed growth, patient received 2 days of ciprofloxacin.  No leukocytosis and afebrile with no current urinary complaints therefore antibiotic therapy discontinued given high risk of side effects in the elderly.  Patient worked with physical therapy who recommended Home with assistance.  Patient stable for discharge home with outpatient follow-up with her PCP and repeat labs to ensure stable renal function.    Exam on Day of Discharge  Temp:  [36.7 °C (98.1 °F)-37 °C (98.6 °F)]  36.9 °C (98.4 °F)  Heart Rate:  [72-86] 86  Resp:  [16-20] 17  BP: (115-187)/(62-88) 172/80    General: No acute distress. AAO x3  HEENT: PERRL, sclerae anicteric. Moist mucous membranes.   Lungs: Clear to ascultation bilaterally. No wheezing.   Cardiovascular: Regular rate and rhythm. No murmurs.  Abdomen: Soft, non tender, non distended.    Extremities: No edema bilaterally.  Neuro: No focal deficits  Psych: Appropriate mood and affect        DISCHARGE MEDICATIONS      Medication List        CONTINUE taking these medications      aspirin 81 mg enteric coated tablet  Take 81 mg by mouth daily.  Dose: 81 mg     atorvastatin 20 mg tablet  Commonly known as: LIPITOR  Take 20 mg by mouth daily.  Dose: 20 mg     calcium citrate-vitamin D3 500 mg-12.5 mcg (500 unit) tablet,chewable  Take 1 tablet by mouth daily.  Dose: 1 tablet     docusate sodium 100 mg capsule  Commonly known as: COLACE  Take 100 mg by mouth daily as needed for constipation.  Dose: 100 mg     dulaglutide 1.5 mg/0.5 mL pen injector  Commonly known as: TRULICITY  Inject 1.5 mg under the skin once a week on Monday. Do not prime. Every Saturday  Hold if blood sugar is less than 240.  Dose: 1.5 mg     fluticasone propionate 50 mcg/actuation nasal spray  Commonly known as: FLONASE  Administer 1 spray into each nostril daily as needed for rhinitis.  Dose: 1 spray     fluticasone-umeclidinium-vilanterol 200-62.5-25 mcg blister with device powder for inhalation  Commonly known as: TRELEGY ELLIPTA  Inhale 1 puff daily.  Dose: 1 puff     insulin glargine U-100 100 unit/mL (3 mL) pen  Commonly known as: LANTUS/BASAGLAR  Inject 15 Units under the skin nightly.  Dose: 15 Units     ipratropium 42 mcg (0.06 %) nasal spray  Commonly known as: ATROVENT  Administer 2 sprays into each nostril 2 (two) times a day.  Dose: 2 spray     JARDIANCE 10 mg tablet  Take 10 mg by mouth daily.  Dose: 10 mg  Generic drug: empagliflozin     levothyroxine 100 mcg tablet  Commonly known  "as: SYNTHROID  Take 100 mcg by mouth daily.  Dose: 100 mcg     oxybutynin XL 10 mg 24 hr tablet  Commonly known as: DITROPAN-XL  Take 10 mg by mouth daily.  Dose: 10 mg     valsartan 320 mg tablet  Commonly known as: DIOVAN  Take 320 mg by mouth daily.  Dose: 320 mg                INSTRUCTIONS AND FOLLOW-UP   Instructions for after discharge       Call provider for:  difficulty breathing      Call provider for:  persistent dizziness or light-headedness, headache, or visual disturbances      Call provider for:  persistent nausea or vomiting      Call provider for:  rash      Call provider for:  severe uncontrolled pain      Call provider for:  temperature >100.4      Call provider for: blood sugar change      Call provider for blood sugar less than 70 or greater than 350    Discharge Diet      Diet Type / Texture: Diabetic (Low Carb/Low Fat)    Normal Activity as Tolerated      Review additional discharge directions in \"Instructions\" section on the last page              Important Issues to Address in Follow-Up  Discharge Follow-up Issues: Instructions to Patient:   Additional Discharge Instructions    Dear Ms. Radford,    You were admitted to [facility] due to . You were found to have an elevation in your kidney function that was likely due to dehydration. Your kidney function returned to baseline after you received IV fluids. You received some antibiotics for a possible UTI, however the urine cultures were indeterminate for a UTI.     Medications: Please see your medication list for updated doses, new, and discontinued medications. If you have any questions regarding your home medications please, you are advised to discuss them with your primary care provider.     Follow ups and Instructions:  [ ] Please follow up with your PCP within 1-2 weeks  [ ] Please repeat labs within 3-4 days to monitor     Please bring this discharge paperwork to all of your follow up appointments. If you experience recurrence " of your symptoms, please do not hesitate to call your primary care doctor or present to the emergency department for evaluation. It was a pleasure taking care of you at [facility]! We wish you the best of health.                  Scheduled Appointments            In 2 days [facility]            Recommended Follow-up Visits  Follow-Up After Discharge       Rachel Buchanan DO   Specialty: Family Medicine   Relationship: PCP - General        Schedule an appointment as soon as possible for a visit            Test Results Pending at Discharge       LABS AND IMAGING   Pertinent Labs  CHEMISTRIES   Results from last 7 days   Lab Units 07/28/25  0844 07/27/25  1017 07/26/25  0607 07/25/25  1721   SODIUM mEQ/L 140 137 142 139   POTASSIUM mEQ/L 4.6 4.9 4.1 4.6   CHLORIDE mEQ/L 107 108* 113* 106   CO2 mEQ/L 27 23 22 22   BUN mg/dL 27* 28* 40* 40*   CREATININE mg/dL 1.7* 1.7* 2.3* 3.1*   GLUCOSE mg/dL 115* 239* 218* 148*   CALCIUM mg/dL 8.9 8.7 8.4* 9.7   EGFR mL/min/1.73m*2 27.3* 27.3* 19.0* 13.3*   ANION GAP mEQ/L 6 6 7 11   MAGNESIUM mg/dL  --   --  2.2  --      CBC RESULTS   Results from last 7 days   Lab Units 07/26/25  0607 07/25/25  1721   WBC K/uL 6.18 8.47   HEMOGLOBIN g/dL 12.8 14.2   HEMATOCRIT % 39.4 43.7   PLATELETS K/uL 166 207     MICROBIOLOGY   Microbiology Results       Procedure Component Value Units Date/Time    Urine culture Urine, Clean Catch [[id number]] Collected: 07/25/25 2104    Specimen: Urine, Clean Catch Updated: 07/27/25 0801     Urine Culture Probable contaminants, suggest recollection      30,000-39,000 cfu/mL Mixed Gram Positive Organisms            Pertinent Imaging  MRI BRAIN WITHOUT CONTRAST  Result Date: 7/26/2025  IMPRESSION:  No acute intracranial lesion Atrophy and nonspecific white matter T2 hyperintensities likely secondary to chronic small vessel ischemia.  Bilateral chronic cerebellar infarcts.    X-RAY CHEST 2  VIEWS  Result Date: 7/26/2025  IMPRESSION: No active disease is seen in the chest.    CT HEAD WITHOUT IV CONTRAST  Result Date: 7/25/2025  IMPRESSION: No acute intracranial hemorrhage, large territorial infarct, mass effect or midline shift is identified.     DXA bone density spine hip and pelvis  Result Date: 7/22/2025  1. The patient has a diagnosis of osteoporosis based on a history of major osteoporotic fracture (fragility fractures involving hip, wrist, humerus or spine). T-scores on this DXA scan correspond to low bone mass (osteopenia). 2.  Since a DXA study from 2/8/2018, there has been: A  STATISTICALLY SIGNIFICANT DECREASE in bone mineral density of 0.070 g/cm2 (1.0%) in the left total hip. A  STATISTICALLY SIGNIFICANT DECREASE in bone mineral density of 0.059 g/cm2 (8.6)% in the left radius. A  STATISTICALLY SIGNIFICANT INCREASE in bone mineral density of 0.041 g/cm2 (3.8)% in the lumbar spine. 3.  The 10 year risk of hip fracture is 1.8% and the 10 year risk of major osteoporotic fracture is 5.8% as calculated by the [employer] fracture risk assessment tool (FRAX, which is based on data generated by the WHO Collaborating [place] for Metabolic Bone Diseases). The patient's age exceeds the upper limit in the FRAX database.  The values have been adjusted to an age of 90 years. 4.  The current Bone Health and Osteoporosis Foundation (BHOF) guidelines recommend treating: -  Patients with a T-score of -2.5 or less in the lumbar spine or hips -  Post-menopausal women and men over the age of 50 with low bone mass (osteopenia; T-score between -1.0 and -2.5) and a FRAX 10 year risk score of >  3% for hip fracture and/or >  20% for major osteoporosis-related fracture (vertebral, hip, wrist, or humerus). 5. The BHOF recommends performance of baseline BMD testing for: - Women aged >  65 years and men aged >  70 years. - Postmenopausal women and men aged 50-69 years, based on risk profile. - Postmenopausal  women and men aged >  50 years with history of adult-age fracture. Also: - Utilize DXA facilities that employ accepted  measures. - Utilize the same DXA facility and on the same densitometry device for each test whenever possible. 6.  The OF recommends follow-up DXA in 1-2 years after initiating or changing medical therapy for osteoporosis and at appropriate intervals thereafter, according to clinical circumstances. More frequent BMD testing may be warranted in higher-risk individuals (multiple fractures, older age, very low BMD). Less frequent BMD testing is warranted as follow-up in patients with initial T-scores in the normal or slightly below normal range (osteopenia) and for patients who have remained fracture free on  treatment.  One research study (Jessica et. al., see reference below) demonstrated that untreated older women with baseline T-scores > -1.5 and no secondary risk factors there was a low likelihood (10%) of transition to osteoporosis in an interval of less than 15 years. 7. [facility] universal recommendations include: - Recommend a diet with adequate total calcium intake (1000mg/day for men aged 50-70 years; 1200 mg/day for women > 51 years and men > 71 years); incorporate calcium supplements if intake is insufficient. - Monitor serum 25-hydroxyvitamin D levels and maintain serum vitamin D sufficiency (> 30 ng/ml but < 50 ng/ml; in healthy individuals > 20 ng/ml may be sufficient). - Identify and address modifiable risk factors associated with falls. - Provide guidance for smoking cessation and avoidance of excessive alcohol intake. The FRAX algorithm has certain limitations: -FRAX has not been validated in patients currently or previously treated with pharmacotherapy for osteoporosis.  In such patients, clinical judgment must be exercised in interpreting FRAX scores. -Prior hip, vertebral and humeral fragility fractures appear to confer greater risk of subsequent fracture than  fractures at other sites (this is especially true for individuals with severe vertebral fractures), but quantification of this incremental risk is not possible with FRAX. -FRAX underestimates fracture risk in patients with history of multiple fragility fractures. -FRAX may underestimate fracture risk in patients with history of frequent falls. -It is not appropriate to use FRAX to monitor treatment response. WHO CLASSIFICATION: Normal (a T-score of -1.0 or higher) Low bone mineral density (a T-score of less than -1.0 but higher than -2.5) Osteoporosis (a T-score of -2.5 or less) Severe osteoporosis (a T-score of -2.5 or less with a fragility fracture) LEAST SIGNIFICANT CHANGE (AT 95% C.I): Lumbar spine: 0.036 g/cm2; 2.2% Total hip: 0.022 g/cm2; 2.6% Forearm: 0.017 g/cm2; 3.0%. SELECTED REFERENCES: Dave MS, Amy SL, Cielo KL, et al. The clinician's guide to prevention and treatment of osteoporosis. Osteoporos Int 2022; 33:5509-7776. Warner D, Jaison SB, Belgica A, et al. DXA reporting updates: 2023 Official Positions of the International Society for Clinical Densitometry. J Clin Densitom 2024; 27: 308534 (https://doi.org/10.1016/j.jocd.2023.070396). Noonan PM, Petak SM, Rayne N, et al. American Association of Clinical Endocrinologists/American College of Endocrinology clinical practice guidelines for the diagnosis and treatment of postmenopausal osteoporosis-2020 update. Endocr Pract 2020; 26(Suppl 1):1-46. Michelle SL and Manjit GARCÍA. The utility and limitations of FRAX: A US perspective. Curr Osteoporos Rep 2010; 8:192-197. Paccou J, Audrey L, Kolta S, et al. High bone mass in adults. Joint Bone Spine 2018; 85: 693-699.   Vijay CL, Mary Carmen SA, Phoenix C, Choco JH. Friend or foe: high bone mineral density on routine bone density scanning, a review of causes and management. Rheumatology 2013;52:969-985. Jessica ML, Chelsea LAUREANO, Sabi JS, et al. Bone-density testing interval and transition to  osteoporosis in older women. N Engl J Med 2012; 366:225-233. Kevin CJ, Rickie J, Fung NC. Serial bone density measurement and incident fracture risk discrimination in postmenopausal women. EAR[place] Intern Med 2020;180:7064-0650. Workstation performed: [id number]         OTHER SERVICES PROVIDED   Consults During Admission  IP CONSULT TO CASE MANAGEMENT  IP CONSULT TO SOCIAL WORK    Procedures Performed  None       Thank you for allowing the Division of Hospital Medicine to care for your patient.      Dudley Pickett MD MPH   07/28/25    >30 mins spent for coordination/counselling related to discharge plan, including final examination of patient, discussion regarding discharge instructions, reconciliation/prescription of medications, preparation of discharge records, etc.

## 2025-07-28 NOTE — PROGRESS NOTES
07/28/25 1431   Hospital to Home   Patient Enrolled in [facility]? Yes   [facility] Coordinator Comment Spoke w/ pt's dtr (Kirstin) to discuss Hospital to Home program. She is agreeable to a phone visit. Telemed appt. with [facility] provider scheduled for Wednesday, July 30, 2025 at 1pm. She declined assistance making other follow-up appt's.

## 2025-07-28 NOTE — PLAN OF CARE
Problem: Adult Inpatient Plan of Care  Goal: Plan of Care Review  Outcome: Progressing  Flowsheets (Taken 7/28/2025 2857)  Outcome Evaluation: PT evaluation completed. Anticipate discharge home with family support when stable. Will follow.  Plan of Care Reviewed With: patient     Problem: Mobility Impairment  Goal: Optimal Mobility  Outcome: Progressing

## 2025-07-28 NOTE — HOSPITAL COURSE
Tiki is a 89 y.o. female admitted on 7/25/2025 with RODOLFO (acute kidney injury) (CMS/HCC) [N17.9]  Exertional rhabdomyolysis [M62.82]. Principal problem is RODOLFO (acute kidney injury) (CMS/[facility]).    Past Medical History  Tiki has no past medical history on file.    History of Present Illness  Patient presenting after wandering out of her home likely confused. Cr on admission 3 with a baseline. Notably dry on exam. Had not eaten or drank all day. RODOLFO on CKD likely pre-renal in setting of no oral intake for the past 24 hrs while outside in hot ambient temperatures.     CTH without acute intracranial abnormality. MRI was obtained. Shows small vessel age related injuries. 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

## 2025-07-28 NOTE — PLAN OF CARE
Plan of Care Review  Plan of Care Reviewed With: patient  Progress: improving  Outcome Evaluation: Pleasant- alert- oriented to person and place. Verbalized desire to return home to her [facility] tomorrow. Denies pain. OOB  with assist of 1- amb to BR- gait slow but steady- may benefit from SW for safety. VSS.Purewick in place whle in bed. FSP maintained. Bed alarm on Call bell within reach. Possible DC for am.

## 2025-07-28 NOTE — PROGRESS NOTES
Physical Therapy -  Initial Evaluation     Patient: Tiki Radford  Location: [place] [address]  MRN: [id number]  Today's date: 7/28/2025    HISTORY OF PRESENT ILLNESS     Tiki is a 89 y.o. female admitted on 7/25/2025 with RODOLFO (acute kidney injury) (CMS/[facility]) [N17.9]  Exertional rhabdomyolysis [M62.82]. Principal problem is RODOLFO (acute kidney injury) (CMS/[facility]).    Past Medical History  Tiki has no past medical history on file.    History of Present Illness  Patient presenting after wandering out of her home likely confused. Cr on admission 3 with a baseline. Notably dry on exam. Had not eaten or drank all day. RODOLFO on CKD likely pre-renal in setting of no oral intake for the past 24 hrs while outside in hot ambient temperatures.     CTH without acute intracranial abnormality. MRI was obtained. Shows small vessel age related injuries. 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     PRIOR LEVEL OF FUNCTION AND LIVING ENVIRONMENT     Prior Level of Function      Flowsheet Row Most Recent Value   Dominant Hand right   Ambulation assistive equipment   Transferring assistive equipment   Toileting independent   Bathing assistive equipment and person   Dressing independent   Eating independent   IADLs assistive person   Driving/Transportation friends/family   Communication understands/communicates without difficulty   Prior Level of Function Comment Patient ambulates with SPC in the home and community. Has assistance for bathing from daughter, otherwise independent with most ADLs.   Assistive Device Currently Used at Home bath bench, cane, straight   History of Falls: No falls in the past year    Prior Living Environment      Flowsheet Row Most Recent Value   People in Home child(concepcion), adult   Current Living Arrangements home   Living Environment Comment Patient lives with daughter in a 2 SH with 4 PITER, stair glide to second floor. WIS or tub available. Patient uses tub shower with bath bench.       VITALS AND PAIN      PT Vitals      Date/Time Pulse HR Source SpO2 Pt Activity O2 Therapy BP BP Location BP Method Pt Position Observations [facility]   07/28/25 0903 85 Monitor 98 % At rest None (Room air) 187/88 Right upper arm Automatic Lying PT    07/28/25 0935 86 Monitor 98 % At rest None (Room air) 172/80 Right upper arm Automatic Sitting Post mobility JM          PT Pain      Date/Time Pain Type Side/Orientation Location Rating: Rest Rating: Activity [facility]   07/28/25 0903 Pain Assessment bilateral knee 2 - mild pain 2 - mild pain                Objective     OBJECTIVE     Start time:  0902  End time:  0937  Session Length: 35 min       General Observations  Patient received supine, in bed. She was agreeable to therapy, no issues or concerns identified by nurse prior to session. Patient in bed. Denies complaints. Agreeable to PT    Precautions: fall       Limitations/Impairments: safety/cognitive   Services  Do You Speak a Language Other Than English at Home?: no      PT Eval and Treat - 07/28/25 0902          Cognition    Orientation Status oriented x 4     Affect/Mental Status WFL     Follows Commands WFL     Cognitive Function WFL     Comment, Cognition Fully intact cognitively. Oriented x 4. Follows all commands. Pleasant and cooperative.        Vision Assessment/Intervention    Vision Assessment corrective lenses full-time        Hearing Assessment    Hearing Status WFL        Sensory Assessment    Sensory Assessment sensation intact except     Sensation Impaired Location(s) left LE;right LE   Bilateral feet neuropathy.       Lower Extremity Assessment    LE Assessment ROM and Strength WFL        Bed Mobility    Bed Mobility Activities right;supine to sit     [place] supervision     Safety/Cues increased time to complete     Assistive Device head of bed elevated     Comment Moves to edge of bed with increased time, but no physical assist.        Mobility Belt    Mobility Belt Used During Session yes         Sit/Stand Transfer    Surface edge of bed     [place] supervision     Safety/Cues increased time to complete     Assistive Device other (see comments)     Transfer Comments Patient stands with hand held assist x 1. Does not have her cane available. Utilized therapists hand for unilateral support.        Gait Training    [place], Gait close supervision     Safety/Cues increased time to complete     Assistive Device other (see comments)     Distance in Feet 100 feet     Pattern step-through     Comment Hand held assist x 1 for unilateral support in the absence of her cane. Gait mildly unsteady, but no LOB noted. She reports fatigue and muscle soreness after increase in activity.        Balance    Static Sitting Balance WFL;sitting, edge of bed     Dynamic Sitting Balance WFL;sitting, edge of bed     Sit to Stand Dynamic Balance mild impairment;supported     Static Standing Balance supported;mild impairment     Dynamic Standing Balance mild impairment;supported     Comment, Balance Benefits from hand held assist.        Impairments/Safety Issues    Impairments Affecting Function balance;endurance/activity tolerance     Functional Endurance Patient reports some fatigue after the increase in activity while wandering.                   Education Documentation  Joint Mobility/Strength, taught by Joanne Lam PT at 7/28/2025  9:42 AM.  Learner: Patient  Readiness: Acceptance  Method: Explanation  Response: Verbalizes Understanding  Comment: PT plan of care and goals, importance of OOB to chair and mobility as able while hospitalized.    Home Safety, taught by Joanne Lam, PT at 7/28/2025  9:42 AM.  Learner: Patient  Readiness: Acceptance  Method: Explanation  Response: Verbalizes Understanding  Comment: PT plan of care and goals, importance of OOB to chair and mobility as able while hospitalized.        Session Outcome  Patient upright, in chair at end of session, chair alarm on, call light in reach, all  needs met, personal items in reach. Nursing notified about change in vital signs, patient's performance, and patient's position.    AM-PAC™ - Mobility (Current Function)     Turning form your back to your side while in flat bed without using bedrails 3 - A Little   Moving from lying on your back to sitting on the side of a flat bed without using bedrails 3 - A Little   Moving to and from a bed to a chair 3 - A Little   Standing up from a chair using your arms 3 - A Little   To walk in a hospital room 3 - A Little   Climbing 3-5 steps with a railing 3 - A Little   AM-PAC™ Mobility Score 18          ASSESSMENT AND PLAN     Progress Summary  PT evaluation completed. Patient relatively independent with SPC prior to admission. Patient was found wandering in the community and brought to ED. Patient currently able to transfer OOB without assist, ambulate into hallway with hand held assist to provide unliateral support. Patient's granddaughter at bedside and reports she appears to be at her baseline. She has excellent family support and supervision. She would be safe for discharge to home with family support when medically stable. Will follow during acute stay.    Patient/Family Therapy Goals Statement: To go home today.    PT Plan      Flowsheet Row Most Recent Value   Rehab Potential good, to achieve stated therapy goals at 07/28/2025 0902   Therapy Frequency 4 times/wk at 07/28/2025 0902   Planned Therapy Interventions balance training, bed mobility training, gait training, home exercise program, patient/family education, stair training, strengthening, transfer training at 07/28/2025 0902       PT Discharge Recommendations      Flowsheet Row Most Recent Value   PT Recommended Discharge Disposition home with assistance, home at 07/28/2025 0902   Anticipated Equipment Needs if Discharged Home (PT) none  [Has cane] at 07/28/2025 0902            PT Goals      Flowsheet Row Most Recent Value   Bed Mobility Goal 1     Activity/Assistive Device bed mobility activities, all at 07/28/2025 0902   [place] modified independence at 07/28/2025 0902   Time Frame by discharge at 07/28/2025 0902   Progress/Outcome goal ongoing at 07/28/2025 0902   Transfer Goal 1    Activity/Assistive Device all transfers at 07/28/2025 0902   [place] modified independence at 07/28/2025 0902   Time Frame by discharge at 07/28/2025 0902   Progress/Outcome goal ongoing at 07/28/2025 0902   Gait Training Goal 1    Activity/Assistive Device gait (walking locomotion), cane, straight at 07/28/2025 0902   [place] modified independence at 07/28/2025 0902   Distance 250 at 07/28/2025 0902   Time Frame by discharge at 07/28/2025 0902   Progress/Outcome goal ongoing at 07/28/2025 0902   Stairs Goal 1    Activity/Assistive Device stairs, all skills at 07/28/2025 0902   [place] modified independence at 07/28/2025 0902   Number of Stairs 12 at 07/28/2025 0902   Time Frame by discharge at 07/28/2025 0902   Progress/Outcome goal ongoing at 07/28/2025 0902

## 2025-07-28 NOTE — PROGRESS NOTES
Occupational Therapy -  Initial Evaluation     Patient: Tiki Radford  Location: [place] [address]  MRN: [id number]  Today's date: 7/28/2025    HISTORY OF PRESENT ILLNESS     Tiki is a 89 y.o. female admitted on 7/25/2025 with RODOLFO (acute kidney injury) (CMS/[facility]) [N17.9]  Exertional rhabdomyolysis [M62.82]. Principal problem is RODOLFO (acute kidney injury) (CMS/[facility]).    Past Medical History  Tiki has no past medical history on file.    History of Present Illness  Patient presenting after wandering out of her home likely confused. Cr on admission 3 with a baseline. Notably dry on exam. Had not eaten or drank all day. RODOLFO on CKD likely pre-renal in setting of no oral intake for the past 24 hrs while outside in hot ambient temperatures.     CTH without acute intracranial abnormality. MRI was obtained. Shows small vessel age related injuries. 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     PRIOR LEVEL OF FUNCTION AND LIVING ENVIRONMENT     Prior Level of Function      Flowsheet Row Most Recent Value   Dominant Hand right   Ambulation assistive equipment   Transferring assistive equipment   Toileting independent   Bathing assistive equipment and person   Dressing independent   Eating independent   IADLs assistive person   Driving/Transportation friends/family   Communication understands/communicates without difficulty   Prior Level of Function Comment Patient ambulates with SPC in the home and community. Has assistance for bathing from daughter, otherwise independent with most ADLs.   Assistive Device Currently Used at Home bath bench, cane, straight        Prior Living Environment      Flowsheet Row Most Recent Value   People in Home child(concepcion), adult   Current Living Arrangements home   Living Environment Comment Patient lives with daughter in a 2 SH with 4 PITER, stair glide to second floor. WIS or tub available. Patient uses tub shower with bath bench.     Occupational Profile      Flowsheet Row Most Recent  Value   Successful Occupations Worked in clothing sales   Occupational History/Life Experiences Enjoys puzzles and coloring   Environmental Supports and Barriers Very supportive family       VITALS AND PAIN            Objective     OBJECTIVE     Start time:  1020  End time:  1043  Session Length: 23 min       General Observations  Patient received upright, in chair. She was agreeable to therapy, no issues or concerns identified by nurse prior to session. Sitting in recliner, granddaughter present    Precautions: fall       Limitations/Impairments: safety/cognitive      OT Eval and Treat - 07/28/25 1020          Cognition    Orientation Status oriented to;person;place   Only able to provide correct date when binary cues provided    Affect/Mental Status WFL;flat/blunted affect     Follows Commands WFL;follows one-step commands;75-90% accuracy;initiation impaired     Cognitive Function safety deficit     Safety Deficit minimal deficit;insight into deficits/self-awareness;awareness of need for assistance     Comment, Cognition AAOx2, agreeable to session. Binary cues needed to select correct orientation. Unable to provide great detail of reason for admit. Aware she was wandering. Following one step commands t/o session. Per granddaughter is back to cognitive baseline this AM.        Vision Assessment/Intervention    Vision Assessment corrective lenses full-time        Hearing Assessment    Hearing Status WFL        Sensory Assessment    Sensory Assessment sensation intact, upper extremities        Upper Extremity Assessment    UE Assessment ROM and Strength WFL     General Observations Grossly 5/5 in BUEs        Bed Mobility    Comment Rec'd OOB in recliner        Mobility Belt    Mobility Belt Used During Session yes        Sit/Stand Transfer    Surface chair with arm rests     [place] supervision     Safety/Cues increased time to complete;verbal cues     Assistive Device --   HHAx1    Transfer Comments S for STS  from recliner. HHAx1 needed to steady. Typically using SPC but not available in session.        Toilet Transfer    Transfer Technique sit/stand     [place] supervision     Safety/Cues increased time to complete;verbal cues;hand placement     Assistive Device grab bars/safety frame     Comment Sitting to standard toilet, use of GB on L        Functional Mobility    Distance in room/bathroom;hallway     Functional Mobility [place] supervision     Safety/Cues verbal cues;hand placement     Assistive Device --   HHAx1    Functional Mobility Comments Supervision for mobility in room to hallway, HHAx1. No LOB.        Upper Body Dressing    Tasks don;hospital gown     [place] supervision     Safety/Cues increased time to complete     Position supported sitting     Comment adjusting gown when sitting in recliner        Lower Body Dressing    Tasks don;socks;underwear     [place] minimum assist (75% or more patient effort)     Safety/Cues increased time to complete     Position supported sitting     Comment [place] needed to don depends and socks post mobility, assist to thread onto BLEs in seated position        Grooming    Tasks washes, rinses and dries hands;washes, rinses and dries face     [place] supervision     Safety/Cues increased time to complete     Position sink side;supported standing     Comment standing sinkside to wash hands        Toileting    Tasks perform bladder hygiene     [place] supervision     Safety/Cues increased time to complete     Position supported sitting     Comment able to void when sitting on toilet, completing hygiene in standing        Balance    Static Sitting Balance WFL;sitting in chair     Dynamic Sitting Balance WFL;sitting in chair     Sit to Stand Dynamic Balance mild impairment;supported     Static Standing Balance mild impairment;supported     Dynamic Standing Balance mild impairment;supported     Comment, Balance benefitting from HHAx1         Impairments/Safety Issues    Impairments Affecting Function balance;endurance/activity tolerance;cognition     Cognitive Impairments, Safety/Performance insight into deficits/self-awareness;awareness, need for assistance     Functional Endurance Tolerating all functional mobility and self care, some deficits noted in functional cognition.                                              Education Documentation  Treatment Plan, taught by Merry Christian OT at 7/28/2025  1:18 PM.  Learner: Patient  Readiness: Acceptance  Method: Explanation  Response: Verbalizes Understanding  Comment: role of OT, OT POC, adapted self care strategies, d/c planning        Session Outcome  Patient upright, in chair at end of session, chair alarm on, call light in reach, personal items in reach, all needs met. Nursing notified about change in vital signs, patient's response to therapy/activity, patient's performance, and patient's position.    AM-PAC™ - ADL (Current Function)     Putting on/taking off regular lower body clothing 3 - A Little   Bathing 3 - A Little   Toileting 3 - A Little   Putting on/taking off regular upper body clothing 3 - A Little   Help for taking care of personal grooming 4 - None   Eating meals 4 - None   AM-PAC™ ADL Score 20          ASSESSMENT AND PLAN     Progress Summary  OT Evaluation completed. AAOx2, mild deficits noted in functional cognition (awareness of deficits, orientation, sequencing). [place] supervision for all functional mobility with HHAx1 (using SPC at baseline, not available in session). Supervision for toileting and UBD. [place] for LBD. Close to functional baseline. Recommending d/c home with increased support/supervision from family.    Patient/Family Therapy Goal Statement: return home with family    OT Plan      Flowsheet Row Most Recent Value   Rehab Potential good, to achieve stated therapy goals at 07/28/2025 1020   Therapy Frequency 3 times/wk at 07/28/2025 1020   Planned Therapy  Interventions activity tolerance training, occupation/activity based interventions, patient/caregiver education/training, strengthening exercise, transfer/mobility retraining, BADL retraining, functional balance retraining, cognitive retraining at 07/28/2025 1020       OT Discharge Recommendations      Flowsheet Row Most Recent Value   OT Recommended Discharge Disposition home with assistance at 07/28/2025 1020   Anticipated Equipment Needs if Discharged Home (OT) none at 07/28/2025 1020            OT Goals      Flowsheet Row Most Recent Value   Bed Mobility Goal 1    Activity/Assistive Device bed mobility activities, all at 07/28/2025 1020   [place] modified independence at 07/28/2025 1020   Time Frame by discharge at 07/28/2025 1020   Progress/Outcome goal ongoing at 07/28/2025 1020   Transfer Goal 1    Activity/Assistive Device all transfers at 07/28/2025 1020   [place] modified independence at 07/28/2025 1020   Time Frame by discharge at 07/28/2025 1020   Progress/Outcome goal ongoing at 07/28/2025 1020   Dressing Goal 1    Activity/Adaptive Equipment dressing skills, all at 07/28/2025 1020   [place] modified independence at 07/28/2025 1020   Time Frame by discharge at 07/28/2025 1020   Progress/Outcome goal ongoing at 07/28/2025 1020   Toileting Goal 1    Activity/Assistive Device toileting skills, all at 07/28/2025 1020   [place] modified independence at 07/28/2025 1020   Time Frame by discharge at 07/28/2025 1020   Progress/Outcome goal ongoing at 07/28/2025 1020   Grooming Goal 1    Activity/Assistive Device grooming skills, all at 07/28/2025 1020   [place] modified independence at 07/28/2025 1020   Time Frame by discharge at 07/28/2025 1020   Progress/Outcome goal ongoing at 07/28/2025 1020

## 2025-07-28 NOTE — PLAN OF CARE
Problem: Adult Inpatient Plan of Care  Goal: Plan of Care Review  Outcome: Progressing  Flowsheets (Taken 7/28/2025 0390)  Progress: improving  Outcome Evaluation: OT Evaluation completed. Recommending d/c home with increased support/supervision from family.  Plan of Care Reviewed With: patient     Problem: Self-Care Deficit  Goal: Improved Ability to Complete Activities of Daily Living  Outcome: Progressing

## 2025-07-29 ENCOUNTER — PATIENT OUTREACH (OUTPATIENT)
Dept: CASE MANAGEMENT | Facility: CLINIC | Age: 89
End: 2025-07-29
Payer: MEDICARE

## 2025-07-29 NOTE — PROGRESS NOTES
NAME: Tiki Radford    MRN: [id number]    YOB: 1928    Event Review:    Initial TCM Patient Outreach Date: 07/29/25    Assessment completed with: Family Member (Kirstin Mejia)  Patient stated reason for hospitalization: Pt had wandered away and ultimately was found in [address] [place] [address] and brought to ER  Discharge Diagnosis: RODOLFO;    Patient readmitted in the last 30 days: No  Discharging Facility: [facility]  Date of Last Admission: 07/25/25  Date of Last Discharge: 07/28/25    Patient's perception of their health status since discharge: Improving    Appointment Scheduling:  PCP appointment scheduled: Yes  TCM Appointment Type: LATOYA 7 Day  Appointment Date: 08/01/25    Appointment Provider: LETTY- for PCP      HPI:  Spoke with patient's daughter today on number: [phone]  Pt presented to [facility] on 7/25/25 with EMS 2/2 being found at [address] [place] [address]. She seemed to have left her home around 1:00 am and was considered a missing elderly person for some time. Until she was found at the terminal. Family shares she can get confused at night, but usually is alert to person and place. This is reportedly the first episode of this kind. Pt was a limited historian and family supplied additional information.   Pt with a PMHx: of CKD stage IV, polymyalgia rheumatica, IDDM type II, cognitive impairment, asthma, hypertension, COPD, carotid stenosis, hyperlipidemia.   From EPIC chart:  Patient is a limited historian and is unsure how she got there but did note to the ED that she was given a ride by a stranger and had rode the buses for awhile. Patient noted that she was very thirsty upon arrival and had not eaten or drank anything all day.  Pt treated with IV fluids 2/2 elevation in kidney function likely due to dehydration. Also treated with abx for suspected UTI.      Pt improved and was discharged to home without services on 7/28/25.    Spoke with pt's daughter today, pt improving since discharge.  "Daughter was NOT with pt upon ACM call. She had left pt with her daughter while she ran some errands. Pt lives in a 2 story home with 4 steps to enter and a stair glide to the 2nd floor bed/bath with daughter. Pt ambulates with cane or a walker depending on how she feels that day. Pt needs assistance with ADL's, provided by daughter.     Pt/Family equipped to manage care.     Discharge instructions reviewed with dtg, she verbalized understanding. Reviewed general recommendations regarding fall safety.    Pt has follow-up with PCP's NP 8/1/25.       ACM explained hospital to home program and reminded patient of the upcoming   appointment with CRNP--  7/30/25 at 1:00 pm     ACM to follow-up in 1 week on/around 8/5/25    Review of Systems   Unable to perform ROS: Other (daughter not with pt)       Medication Review:    Medication Review: No  If No, please explain: Other (Dtg was driving and did not want to have ACM call back at a later more opportune time)  Reported by: Child  Any new medications prescribed at discharge?: No      Nursing Interventions: No intervention needed  Reconciled the current and discharge medications: No  Reviewed AVS (Discharge Instructions)?: Yes    Acute Pain:  Acute pain: No    Chronic Pain:  Chronic pain: Yes  Limitation of routine activities due to chronic pain?: yes  Chronic pain severity:  (dtg unable to say)  Chronic pain timing: intermittent  Location of chronic pain: RA/ shoulder bothering her- RA \"comes and goes with pain\"    Diet/Nutrition:  Type of Diet: Regular, Diabetic  Diet Adherence: Adherent with diet    Home Care Services:  Home Care Agency: Other (None)    Home Care Interventions: No intervention required     Post-Discharge Durable Medical Equipment::    Durable Medical Equipment: Cane, Front wheeled walker, Shower/tub seat, Glucometer, Stair glide  Does patient's condition require a scale?: No    Oxygen Use: No    Has all Durable Medical Equipment (DME) been delivered?: " Other (N/A)  DME Interventions: No intervention required    Home Management:  Living Arrangement: Child (Kirstin lives with pt)  Support System:: Family, Child/Children  Type of Residence: 2 story house (4 steps to enter; stair glide to 2nd floor bed/bath)  Home Monitoring: Blood Sugars  Any patient reported falls in the last 3 months?: Yes (dropped to knees/ no injury)  Mosque or spiritual beliefs that impact treatment?: No    Follow-Ups:   Relevant Labs & Tests: Repeat labs 3-4 days (instructed Dtg to take AVS with her to appt 8/1/25 Friday with physicians NP)  Relevant Specialist Follow-ups: H call with Priscilla SAENZ 7/30/25 at 1:00 pm; PCP NP 8/1/25    Interventions/ Care Coordination:  Interventions/ Care Coordination: Addressed a knowledge deficit  General Education: Respiratory precautions (flu, colds, pneumonia, RSV, COVID-19), Hot weather precautions, Falls and home safety precautions, Infection prevention instructions, Nutrition and hydration instructions  Disease Specific Education: Other (safety and monitoring for cognitive deficits causing pt to wander)  Initiated Care Plan: LATOYA    Reviewed signs/symptoms of worsening condition or complication that necessitate a call to the Physician's office.  Educated patient on access to care.  RN phone number given for future care management.    Jackie Noguera MSN, RN, [facility],   [facility]  [phone]

## 2025-07-30 ENCOUNTER — TELEMEDICINE (OUTPATIENT)
Dept: HOSPITALIST | Facility: CLINIC | Age: 89
End: 2025-07-30
Payer: MEDICARE

## 2025-07-30 DIAGNOSIS — N17.9 AKI (ACUTE KIDNEY INJURY) (CMS/HCC): Primary | ICD-10-CM

## 2025-07-30 DIAGNOSIS — N39.0 URINARY TRACT INFECTION WITHOUT HEMATURIA, SITE UNSPECIFIED: ICD-10-CM

## 2025-07-30 DIAGNOSIS — T79.6XXD TRAUMATIC RHABDOMYOLYSIS, SUBSEQUENT ENCOUNTER: ICD-10-CM

## 2025-07-30 RX ORDER — DICLOFENAC SODIUM 10 MG/G
GEL TOPICAL 2 TIMES DAILY PRN
Qty: 100 G | Refills: 1 | Status: SHIPPED | OUTPATIENT
Start: 2025-07-30 | End: 2025-08-29

## 2025-07-30 NOTE — PATIENT INSTRUCTIONS
Please contact PCP AT THE ONSET of any fevers, chills, increased fatigue or confusion.     To see PCP 8/1, defer labs to that service    Renewed Trelegy    Requested Voltaren cream

## 2025-07-30 NOTE — PROGRESS NOTES
Hospital to Home Program Visit       Hospital to Home Program Visit:  Audio Only Encounter     DISCHARGE INFORMATION   Assessment completed with: Family Member  Discharge Diagnosis: RODOLFO  Discharging Facility: [facility]  Date of Last Discharge: 07/28/25  Discharge Disposition: Home  Patient's perception of their health status since discharge: Improving    PCP: Rachel Buchanan DO       HPI / OVERVIEW OF VISIT      Tiki Radford is a chronically ill  89 y.o. female with a PMH of HTN, PMR,  DM, CKD IV, asthma, obesity and cognitive impairment. Hospitalized at [facility] 7/25- 7/28 after being found wondering around [address] [facility]. Family had reported her missing several hours prior. In the ER, patient confused, reports dizziness and thirst. Labs showed Cr 3.0 (baseline 1.7) with elevated CK. ARB held, treated with IVF. Transiently in UTI given concern for possible infection. Stay complicated by acute onset of L sided facial weakness. MRI showed chronic small vessel disease. DC to home with close OP follow up.       7/30-   Patient has been staying with family in [facility]. Cognitively and physically she is back to baseline. Appetite is fair. No fevers or chills. Using cane to ambulate. Reports body aches- requested Voltaren cream. Script to preferred pharmacy. Has upcoming appt with PCP. Will need follow up labs.         Who would you call if you had a problem?  Please contact PCP AT THE ONSET of any fevers, chills, increased fatigue or confusion.     Overall, how was your care that was provided and do you have any suggestions for improvement?  Satisfied by the care provided at [facility] 7/25- 7/28    Above Average -       PROBLEMS      Patient Active Problem List   Diagnosis    RODOLFO (acute kidney injury) (CMS/formerly [facility])    Primary hypertension    Type 2 diabetes mellitus with hyperglycemia, with long-term current use of insulin (CMS/formerly [facility])    PMR (polymyalgia rheumatica) (CMS/formerly [facility])    Type 2 diabetes  mellitus (CMS/[facility])    Cognitive impairment         MEDICATIONS      Reconciled the current and discharge medications: Yes      Current Outpatient Medications:     aspirin 81 mg enteric coated tablet, Take 81 mg by mouth daily., Disp: , Rfl:     atorvastatin (LIPITOR) 20 mg tablet, Take 20 mg by mouth daily., Disp: , Rfl:     calcium citrate-vitamin D3 500 mg-12.5 mcg (500 unit) tablet,chewable, Take 1 tablet by mouth daily., Disp: , Rfl:     diclofenac sodium (VOLTAREN) 1 % topical gel, Apply topically 2 (two) times a day as needed for pain., Disp: 100 g, Rfl: 1    docusate sodium (COLACE) 100 mg capsule, Take 100 mg by mouth daily as needed for constipation., Disp: , Rfl:     dulaglutide (TRULICITY) 1.5 mg/0.5 mL pen injector, Inject 1.5 mg under the skin once a week on Monday. Do not prime. Every Saturday  Hold if blood sugar is less than 240., Disp: , Rfl:     empagliflozin (JARDIANCE) 10 mg tablet, Take 10 mg by mouth daily., Disp: , Rfl:     fluticasone propionate (FLONASE) 50 mcg/actuation nasal spray, Administer 1 spray into each nostril daily as needed for rhinitis., Disp: , Rfl:     fluticasone-umeclidinium-vilanterol (TRELEGY ELLIPTA) 200-62.5-25 mcg blister with device powder for inhalation, Inhale 1 puff daily., Disp: 30 each, Rfl: 0    insulin glargine U-100 (LANTUS/BASAGLAR) 100 unit/mL (3 mL) pen, Inject 15 Units under the skin nightly., Disp: , Rfl:     ipratropium (ATROVENT) 42 mcg (0.06 %) nasal spray, Administer 2 sprays into each nostril 2 (two) times a day., Disp: , Rfl:     levothyroxine (SYNTHROID) 100 mcg tablet, Take 100 mcg by mouth daily., Disp: , Rfl:     oxybutynin XL (DITROPAN-XL) 10 mg 24 hr tablet, Take 10 mg by mouth daily., Disp: , Rfl:     valsartan (DIOVAN) 320 mg tablet, Take 320 mg by mouth daily., Disp: , Rfl:     I am having Tiki Radford maintain her levothyroxine, valsartan, oxybutynin XL, insulin glargine U-100, fluticasone propionate, dulaglutide, empagliflozin,  atorvastatin, aspirin, ipratropium, docusate sodium, calcium citrate-vitamin D3, fluticasone-umeclidinium-vilanterol, and diclofenac sodium.    REVIEW OF SYSTEMS      All other systems reviewed and negative except as noted in HPI.      POST DISCHARGE MEDICAL EQUIPMENT      DME Interventions: No intervention required  Oxygen Use: No          FOLLOW-UP APPOINTMENTS      Appointment Provider: PCP  Appointment Date: 08/01/25    INTERVENTIONS   Interventions needed: prescribed new medication, updated medication listing, communicated with another interdisciplinary provider            PATIENT INSTRUCTIONS      Patient Instructions   Please contact PCP AT THE ONSET of any fevers, chills, increased fatigue or confusion.     To see PCP 8/1, defer labs to that service    Renewed Trelegy    Requested Voltaren cream            LETTY Justice  7/30/2025

## 2025-08-05 ENCOUNTER — PATIENT OUTREACH (OUTPATIENT)
Dept: CASE MANAGEMENT | Facility: CLINIC | Age: 89
End: 2025-08-05
Payer: MEDICARE

## 2025-08-05 NOTE — PROGRESS NOTES
"     H2H call placed to pt on [phone]   SOB-- none   CP-- none   SWELLING-- none   VOIDING-- without difficulty   FEVER/CHILLS-- none   PCP-- 8/1/25 dtg said PCP stated transient global amnesia episode     Dtg states pt is doing quite well. Saw PCP who feels it was transient global amnesia episode. Pt is \"fine now\" as per dtg.   Dtg states no shortness of breath/chest pain/palpitations.   Dtg states no lower extremity swelling.   Pt continues a diabetic diet.       Pt is compliant with medication regimen.   Dtg states no further questions or concerns today regarding care.     Care Plan: Transition of Care Template   Updates made by Jackie Noguera RN since 8/5/2025 12:00 AM        Problem: Patient Education Knowledge Deficit         Goal: Patient will verbalize understanding of how their diagnosis affects the body         Task: Educate patient on the mechanics of their disease process Completed 8/5/2025   Responsible User: Jackie Noguera RN        Task: Educate patient on their diagnosis Completed 8/5/2025   Responsible User: Jackie Noguera RN        Problem: Progression and Care Needs         Goal: Patient will follow up with medical specialists and PCP         Task: Educate patient on importance of recommended medical follow up to prevent hospital readmission Completed 8/5/2025   Responsible User: Jackie Noguera RN        Task: Assist patient with scheduling of PCP appoinment within recommeneded time frame as needed    Responsible User: Jackie Noguera RN        Problem: Diet         Goal: Patient will verbalize understanding of nutrition as it relates to their diagnosis         Task: Educate patient regarding diet Completed 8/5/2025   Responsible User: Jackie Noguera RN        Task: Educate patient on how diet affects their diagnosis Completed 8/5/2025   Responsible User: Jackie Noguera RN        Problem: Home Safety Measures         Goal: Patient will verbalize " understanding of how their diagnosis affects their body mechanics and ADL's         Task: Educate the patient on the effects of a new treatment plan related to their diagnosis Completed 8/5/2025   Responsible User: Jackie Noguera RN        ACM to follow up in 1 week-on/around 8/12/25.    Jackie Noguera MSN, RN, [facility],   [facility]  [phone]

## 2025-08-11 ENCOUNTER — PATIENT OUTREACH (OUTPATIENT)
Dept: CASE MANAGEMENT | Facility: CLINIC | Age: 89
End: 2025-08-11
Payer: MEDICARE